# Patient Record
Sex: FEMALE | Race: WHITE | NOT HISPANIC OR LATINO | URBAN - METROPOLITAN AREA
[De-identification: names, ages, dates, MRNs, and addresses within clinical notes are randomized per-mention and may not be internally consistent; named-entity substitution may affect disease eponyms.]

---

## 2017-11-13 ENCOUNTER — EMERGENCY (EMERGENCY)
Facility: HOSPITAL | Age: 82
LOS: 1 days | Discharge: HOME | End: 2017-11-13
Attending: EMERGENCY MEDICINE | Admitting: EMERGENCY MEDICINE
Payer: MEDICARE

## 2017-11-13 VITALS
SYSTOLIC BLOOD PRESSURE: 147 MMHG | RESPIRATION RATE: 18 BRPM | OXYGEN SATURATION: 98 % | HEART RATE: 60 BPM | DIASTOLIC BLOOD PRESSURE: 70 MMHG

## 2017-11-13 VITALS
RESPIRATION RATE: 16 BRPM | OXYGEN SATURATION: 96 % | TEMPERATURE: 97 F | SYSTOLIC BLOOD PRESSURE: 138 MMHG | DIASTOLIC BLOOD PRESSURE: 69 MMHG | HEART RATE: 71 BPM

## 2017-11-13 DIAGNOSIS — Z90.710 ACQUIRED ABSENCE OF BOTH CERVIX AND UTERUS: Chronic | ICD-10-CM

## 2017-11-13 PROCEDURE — 72125 CT NECK SPINE W/O DYE: CPT | Mod: 26

## 2017-11-13 PROCEDURE — 70450 CT HEAD/BRAIN W/O DYE: CPT | Mod: 26

## 2017-11-13 PROCEDURE — 70450 CT HEAD/BRAIN W/O DYE: CPT

## 2017-11-13 PROCEDURE — 71045 X-RAY EXAM CHEST 1 VIEW: CPT

## 2017-11-13 PROCEDURE — 99284 EMERGENCY DEPT VISIT MOD MDM: CPT | Mod: 25

## 2017-11-13 PROCEDURE — 72170 X-RAY EXAM OF PELVIS: CPT

## 2017-11-13 PROCEDURE — 72125 CT NECK SPINE W/O DYE: CPT

## 2017-11-13 PROCEDURE — 99284 EMERGENCY DEPT VISIT MOD MDM: CPT | Mod: GC

## 2017-11-13 PROCEDURE — 71010: CPT | Mod: 26

## 2017-11-13 PROCEDURE — 72170 X-RAY EXAM OF PELVIS: CPT | Mod: 26

## 2017-11-13 NOTE — ED PROVIDER NOTE - PROGRESS NOTE DETAILS
ct negative. ambulating. will d/c back to Assisted living The patient and/or family has been educated on signs and the risks of subdural due to just falling and the swinging of the head back and forth as well as with contact and closed head injuries. Strict follow up instructions were given concerning subdural bleeding including but not limited to: confusion, nausea, vomiting, headache, mental status changes, difficulty walking, any concerns at all, etc..

## 2017-11-13 NOTE — ED ADULT NURSE NOTE - OBJECTIVE STATEMENT
Pt arrived via EMS from [facility] after falling and hitting back of head this morning at breakfast. Pt A&O x4, reports her foot was caught in her purse and she fell and hit her left side and head hit the floor, Pt denies headache, denies LOC, takes 81mg asa daily. Skin w/d/i. Pt has full range of motion in upper and lower extremities and reports no pain. Pt has B/L clear, diminished breath sounds. Pt denies chest pain, shortness of breath. Neuro WNL, B/L UE & LE equal strength, no numbness, pupils equal.

## 2017-11-13 NOTE — ED PROVIDER NOTE - MEDICAL DECISION MAKING DETAILS
Victorino: Patient s/p mechanical fall on ASA. no prior symptoms. will get ct head/c-spine, patient declined pain medications. will likely d/c back to assisted living.

## 2017-11-13 NOTE — ED ADULT NURSE NOTE - PMH
Anxiety    Depression    GERD (gastroesophageal reflux disease)    Hypertension    Insomnia    Osteopenia    Pacemaker    Spastic bladder    Vitamin B12 deficiency

## 2017-11-13 NOTE — ED PROVIDER NOTE - OBJECTIVE STATEMENT
89 yo F who lives in [facility] reports suffering a mechanical fall while sitting down for breakfast after she accidently got her foot stuck in her purse straps resulting in her falling over her left side. She denies LOC. She was down for < 2 minutes. She believes she may have made contact with the side of her head but otherwise has no complaints, including no pain anywhere. Patient is on 81 mg ASA and not on anti-coagulation.

## 2017-11-13 NOTE — ED ADULT NURSE REASSESSMENT NOTE - NS ED NURSE REASSESS COMMENT FT1
Sarah  states ambulette to take pt home will be arriving around 1700. Pt aware. Pt is resting in stretcher reading in NAD. PCA assisted pt to the bathroom

## 2018-10-12 ENCOUNTER — INPATIENT (INPATIENT)
Facility: HOSPITAL | Age: 83
LOS: 3 days | Discharge: HOME | DRG: 640 | End: 2018-10-16
Attending: INTERNAL MEDICINE | Admitting: INTERNAL MEDICINE
Payer: MEDICARE

## 2018-10-12 VITALS
HEIGHT: 62 IN | TEMPERATURE: 98 F | WEIGHT: 100.09 LBS | DIASTOLIC BLOOD PRESSURE: 63 MMHG | SYSTOLIC BLOOD PRESSURE: 102 MMHG | RESPIRATION RATE: 20 BRPM | OXYGEN SATURATION: 98 % | HEART RATE: 68 BPM

## 2018-10-12 DIAGNOSIS — Z90.710 ACQUIRED ABSENCE OF BOTH CERVIX AND UTERUS: Chronic | ICD-10-CM

## 2018-10-12 DIAGNOSIS — E87.1 HYPO-OSMOLALITY AND HYPONATREMIA: ICD-10-CM

## 2018-10-12 LAB
ALBUMIN SERPL ELPH-MCNC: 4.5 G/DL — SIGNIFICANT CHANGE UP (ref 3.3–5)
ALP SERPL-CCNC: 58 U/L — SIGNIFICANT CHANGE UP (ref 40–120)
ALT FLD-CCNC: 17 U/L — SIGNIFICANT CHANGE UP (ref 10–45)
ANION GAP SERPL CALC-SCNC: 13 MMOL/L — SIGNIFICANT CHANGE UP (ref 5–17)
APPEARANCE UR: CLEAR — SIGNIFICANT CHANGE UP
AST SERPL-CCNC: 18 U/L — SIGNIFICANT CHANGE UP (ref 10–40)
BACTERIA # UR AUTO: ABNORMAL
BASOPHILS # BLD AUTO: 0.1 K/UL — SIGNIFICANT CHANGE UP (ref 0–0.2)
BASOPHILS NFR BLD AUTO: 1.1 % — SIGNIFICANT CHANGE UP (ref 0–2)
BILIRUB SERPL-MCNC: 0.3 MG/DL — SIGNIFICANT CHANGE UP (ref 0.2–1.2)
BILIRUB UR-MCNC: NEGATIVE — SIGNIFICANT CHANGE UP
BUN SERPL-MCNC: 19 MG/DL — SIGNIFICANT CHANGE UP (ref 7–23)
CALCIUM SERPL-MCNC: 9.3 MG/DL — SIGNIFICANT CHANGE UP (ref 8.4–10.5)
CHLORIDE SERPL-SCNC: 89 MMOL/L — LOW (ref 96–108)
CO2 SERPL-SCNC: 21 MMOL/L — LOW (ref 22–31)
COLOR SPEC: SIGNIFICANT CHANGE UP
CREAT SERPL-MCNC: 0.73 MG/DL — SIGNIFICANT CHANGE UP (ref 0.5–1.3)
DIFF PNL FLD: NEGATIVE — SIGNIFICANT CHANGE UP
EOSINOPHIL # BLD AUTO: 0.1 K/UL — SIGNIFICANT CHANGE UP (ref 0–0.5)
EOSINOPHIL NFR BLD AUTO: 1 % — SIGNIFICANT CHANGE UP (ref 0–6)
EPI CELLS # UR: 1 /HPF — SIGNIFICANT CHANGE UP
GLUCOSE SERPL-MCNC: 107 MG/DL — HIGH (ref 70–99)
GLUCOSE UR QL: NEGATIVE — SIGNIFICANT CHANGE UP
HCT VFR BLD CALC: 31.3 % — LOW (ref 34.5–45)
HGB BLD-MCNC: 11 G/DL — LOW (ref 11.5–15.5)
HYALINE CASTS # UR AUTO: 0 /LPF — SIGNIFICANT CHANGE UP (ref 0–2)
KETONES UR-MCNC: NEGATIVE — SIGNIFICANT CHANGE UP
LEUKOCYTE ESTERASE UR-ACNC: ABNORMAL
LYMPHOCYTES # BLD AUTO: 0.8 K/UL — LOW (ref 1–3.3)
LYMPHOCYTES # BLD AUTO: 12.1 % — LOW (ref 13–44)
MCHC RBC-ENTMCNC: 32.2 PG — SIGNIFICANT CHANGE UP (ref 27–34)
MCHC RBC-ENTMCNC: 35.1 GM/DL — SIGNIFICANT CHANGE UP (ref 32–36)
MCV RBC AUTO: 91.8 FL — SIGNIFICANT CHANGE UP (ref 80–100)
MONOCYTES # BLD AUTO: 0.6 K/UL — SIGNIFICANT CHANGE UP (ref 0–0.9)
MONOCYTES NFR BLD AUTO: 8.3 % — SIGNIFICANT CHANGE UP (ref 2–14)
NEUTROPHILS # BLD AUTO: 5.3 K/UL — SIGNIFICANT CHANGE UP (ref 1.8–7.4)
NEUTROPHILS NFR BLD AUTO: 77.4 % — HIGH (ref 43–77)
NITRITE UR-MCNC: NEGATIVE — SIGNIFICANT CHANGE UP
OSMOLALITY SERPL: 263 MOS/KG — LOW (ref 275–300)
OSMOLALITY UR: 193 MOS/KG — LOW (ref 300–900)
PH UR: 7 — SIGNIFICANT CHANGE UP (ref 5–8)
PLATELET # BLD AUTO: 273 K/UL — SIGNIFICANT CHANGE UP (ref 150–400)
POTASSIUM SERPL-MCNC: 5 MMOL/L — SIGNIFICANT CHANGE UP (ref 3.5–5.3)
POTASSIUM SERPL-SCNC: 5 MMOL/L — SIGNIFICANT CHANGE UP (ref 3.5–5.3)
PROT SERPL-MCNC: 7.3 G/DL — SIGNIFICANT CHANGE UP (ref 6–8.3)
PROT UR-MCNC: NEGATIVE — SIGNIFICANT CHANGE UP
RBC # BLD: 3.41 M/UL — LOW (ref 3.8–5.2)
RBC # FLD: 12.4 % — SIGNIFICANT CHANGE UP (ref 10.3–14.5)
RBC CASTS # UR COMP ASSIST: 2 /HPF — SIGNIFICANT CHANGE UP (ref 0–4)
SODIUM SERPL-SCNC: 123 MMOL/L — LOW (ref 135–145)
SODIUM UR-SCNC: 27 MMOL/L — SIGNIFICANT CHANGE UP
SP GR SPEC: 1.01 — LOW (ref 1.01–1.02)
UROBILINOGEN FLD QL: NEGATIVE — SIGNIFICANT CHANGE UP
WBC # BLD: 6.8 K/UL — SIGNIFICANT CHANGE UP (ref 3.8–10.5)
WBC # FLD AUTO: 6.8 K/UL — SIGNIFICANT CHANGE UP (ref 3.8–10.5)
WBC UR QL: 20 /HPF — HIGH (ref 0–5)

## 2018-10-12 PROCEDURE — 93010 ELECTROCARDIOGRAM REPORT: CPT

## 2018-10-12 PROCEDURE — 99285 EMERGENCY DEPT VISIT HI MDM: CPT | Mod: GC,25

## 2018-10-12 PROCEDURE — 71045 X-RAY EXAM CHEST 1 VIEW: CPT | Mod: 26

## 2018-10-12 RX ORDER — SODIUM CHLORIDE 9 MG/ML
1000 INJECTION INTRAMUSCULAR; INTRAVENOUS; SUBCUTANEOUS
Qty: 0 | Refills: 0 | Status: DISCONTINUED | OUTPATIENT
Start: 2018-10-12 | End: 2018-10-13

## 2018-10-12 RX ORDER — ZOLPIDEM TARTRATE 10 MG/1
5 TABLET ORAL AT BEDTIME
Qty: 0 | Refills: 0 | Status: DISCONTINUED | OUTPATIENT
Start: 2018-10-12 | End: 2018-10-16

## 2018-10-12 RX ORDER — LOSARTAN POTASSIUM 100 MG/1
100 TABLET, FILM COATED ORAL DAILY
Qty: 0 | Refills: 0 | Status: DISCONTINUED | OUTPATIENT
Start: 2018-10-12 | End: 2018-10-16

## 2018-10-12 RX ORDER — CITALOPRAM 10 MG/1
20 TABLET, FILM COATED ORAL DAILY
Qty: 0 | Refills: 0 | Status: DISCONTINUED | OUTPATIENT
Start: 2018-10-12 | End: 2018-10-16

## 2018-10-12 RX ORDER — NIFEDIPINE 30 MG
60 TABLET, EXTENDED RELEASE 24 HR ORAL DAILY
Qty: 0 | Refills: 0 | Status: DISCONTINUED | OUTPATIENT
Start: 2018-10-12 | End: 2018-10-16

## 2018-10-12 RX ORDER — PANTOPRAZOLE SODIUM 20 MG/1
40 TABLET, DELAYED RELEASE ORAL
Qty: 0 | Refills: 0 | Status: DISCONTINUED | OUTPATIENT
Start: 2018-10-12 | End: 2018-10-16

## 2018-10-12 RX ORDER — SALIVA SUBSTITUTE COMB NO.11 351 MG
5 POWDER IN PACKET (EA) MUCOUS MEMBRANE AT BEDTIME
Qty: 0 | Refills: 0 | Status: DISCONTINUED | OUTPATIENT
Start: 2018-10-12 | End: 2018-10-16

## 2018-10-12 RX ORDER — ALPRAZOLAM 0.25 MG
0.25 TABLET ORAL THREE TIMES A DAY
Qty: 0 | Refills: 0 | Status: DISCONTINUED | OUTPATIENT
Start: 2018-10-12 | End: 2018-10-13

## 2018-10-12 RX ORDER — MIRTAZAPINE 45 MG/1
45 TABLET, ORALLY DISINTEGRATING ORAL AT BEDTIME
Qty: 0 | Refills: 0 | Status: DISCONTINUED | OUTPATIENT
Start: 2018-10-12 | End: 2018-10-16

## 2018-10-12 RX ORDER — CEFTRIAXONE 500 MG/1
1 INJECTION, POWDER, FOR SOLUTION INTRAMUSCULAR; INTRAVENOUS ONCE
Qty: 0 | Refills: 0 | Status: COMPLETED | OUTPATIENT
Start: 2018-10-12 | End: 2018-10-12

## 2018-10-12 RX ORDER — GABAPENTIN 400 MG/1
300 CAPSULE ORAL
Qty: 0 | Refills: 0 | Status: DISCONTINUED | OUTPATIENT
Start: 2018-10-12 | End: 2018-10-16

## 2018-10-12 RX ORDER — CEFTRIAXONE 500 MG/1
1 INJECTION, POWDER, FOR SOLUTION INTRAMUSCULAR; INTRAVENOUS EVERY 24 HOURS
Qty: 0 | Refills: 0 | Status: DISCONTINUED | OUTPATIENT
Start: 2018-10-12 | End: 2018-10-16

## 2018-10-12 RX ORDER — CALCIUM CARBONATE 500(1250)
1 TABLET ORAL DAILY
Qty: 0 | Refills: 0 | Status: DISCONTINUED | OUTPATIENT
Start: 2018-10-12 | End: 2018-10-16

## 2018-10-12 RX ORDER — HEPARIN SODIUM 5000 [USP'U]/ML
5000 INJECTION INTRAVENOUS; SUBCUTANEOUS EVERY 8 HOURS
Qty: 0 | Refills: 0 | Status: DISCONTINUED | OUTPATIENT
Start: 2018-10-12 | End: 2018-10-16

## 2018-10-12 RX ORDER — ASPIRIN/CALCIUM CARB/MAGNESIUM 324 MG
81 TABLET ORAL DAILY
Qty: 0 | Refills: 0 | Status: DISCONTINUED | OUTPATIENT
Start: 2018-10-12 | End: 2018-10-16

## 2018-10-12 RX ORDER — ACETAMINOPHEN 500 MG
325 TABLET ORAL EVERY 6 HOURS
Qty: 0 | Refills: 0 | Status: DISCONTINUED | OUTPATIENT
Start: 2018-10-12 | End: 2018-10-16

## 2018-10-12 RX ADMIN — CEFTRIAXONE 100 GRAM(S): 500 INJECTION, POWDER, FOR SOLUTION INTRAMUSCULAR; INTRAVENOUS at 13:43

## 2018-10-12 RX ADMIN — HEPARIN SODIUM 5000 UNIT(S): 5000 INJECTION INTRAVENOUS; SUBCUTANEOUS at 22:00

## 2018-10-12 NOTE — ED PROVIDER NOTE - MEDICAL DECISION MAKING DETAILS
89 yo F PMHx HTN, pacemaker, anxiety, spastic bladder, osteopenia, arrives from [facility] p/w weakness, well-appearing, will check labs, UA, CXR. reevaluate Dr. Sommers (Attending Physician)  89 yo F PMHx HTN, pacemaker, anxiety, spastic bladder, osteopenia, arrives from [facility] p/w weakness, well-appearing, will check labs, UA, CXR. reevaluate

## 2018-10-12 NOTE — ED PROVIDER NOTE - ATTENDING CONTRIBUTION TO CARE
Dr. Sommers (Attending Physician)  I performed a history and physical exam of the patient and discussed their management with the resident. I reviewed the resident's note and agree with the documented findings and plan of care. My medical decision making and observations are found above.

## 2018-10-12 NOTE — ED ADULT NURSE NOTE - NSIMPLEMENTINTERV_GEN_ALL_ED
Implemented All Fall with Harm Risk Interventions:  [place] to call system. Call bell, personal items and telephone within reach. Instruct patient to call for assistance. Room bathroom lighting operational. Non-slip footwear when patient is off stretcher. Physically safe environment: no spills, clutter or unnecessary equipment. Stretcher in lowest position, wheels locked, appropriate side rails in place. Provide visual cue, wrist band, yellow gown, etc. Monitor gait and stability. Monitor for mental status changes and reorient to person, place, and time. Review medications for side effects contributing to fall risk. Reinforce activity limits and safety measures with patient and family. Provide visual clues: red socks.

## 2018-10-12 NOTE — H&P ADULT - HISTORY OF PRESENT ILLNESS
89 yo F PMHx HTN, pacemaker, anxiety, spastic bladder, osteopenia, arrives from [facility] p/w weakness. Pt states her legs feel weak from her shins to her ankles. She denies fevers/chills, CP/palpitations, SOB, N/V, abd pain, dysuria. She has had some urinary leakage recently. She has also lost 10 lbs in the past several weeks. She saw her PMD Dr. Salinas about 2 weeks ago at which point no bloodwork was done and she was told to return in 2 weeks. Pt walks with a walker at baseline and always used her walker.

## 2018-10-12 NOTE — ED ADULT NURSE NOTE - OBJECTIVE STATEMENT
89 yo presents to the ED from [place] by EMS s/p "feeling weak this morning". EMS reports that pt was ambulating with walker this morning when she felt bl under the knees weakness. pt denies pain currently. PMH of anxiety, depression and HTN. 89 yo presents to the ED from [place] by EMS s/p "feeling weak this morning". EMS reports that pt was ambulating with walker this morning when she felt bl under the knees weakness. pt denies pain currently. PMH of anxiety, depression and HTN. Patient states that she has no weakness above the knee. Denies fever, chills, n/v, abd pain, diarrhea/constipation, headache, dizziness, numbness/tingling, urinary s/s. Patient AAOx3, in no respiratory distress, no chest pain. Patient safety maintained with side rails up, call bell within reach and bed in the lowest position. 89 yo presents to the ED from [place] by EMS A&Ox4 s/p "feeling weak this morning". EMS reports that pt was ambulating with walker this morning when she felt bl under the knees weakness. pt denies pain currently. PMH of anxiety, depression and HTN. Patient states that she has no weakness above the knee, pt points to shin and states "its just here, it just feels weak". pt states "I have visions that I might fall". pt denies recent falls. pt states that she always ambulates with walker. pt reports that when she felt weak this morning, she sat down immediately. Denies fever, chills, n/v, abd pain, diarrhea/constipation, headache, dizziness, numbness/tingling, urinary s/s. pt reports weight loss of 10lb within recent month, pt states "I push myself to eat, I cant drink water because it has acid in it so I drink milk". pt recently saw PMD two weeks ago and pt states "they checked me out they said everything was fine and told me to come back in a few weeks". Patient in no respiratory distress, no chest pain, VSS. abd soft nontender nondistended. gross neuro intact, pt following commands without any difficulty. pt is well appearing. Patient safety maintained with side rails up, call bell within reach and bed in the lowest position.

## 2018-10-12 NOTE — H&P ADULT - NSHPLABSRESULTS_GEN_ALL_CORE
LABS:                        11.0   6.8   )-----------( 273      ( 12 Oct 2018 12:25 )             31.3     10-12    123<L>  |  89<L>  |  19  ----------------------------<  107<H>  5.0   |  21<L>  |  0.73    Ca    9.3      12 Oct 2018 12:25    TPro  7.3  /  Alb  4.5  /  TBili  0.3  /  DBili  x   /  AST  18  /  ALT  17  /  AlkPhos  58  10-12      Urinalysis Basic - ( 12 Oct 2018 12:45 )    Color: Light Yellow / Appearance: Clear / S.007 / pH: x  Gluc: x / Ketone: Negative  / Bili: Negative / Urobili: Negative   Blood: x / Protein: Negative / Nitrite: Negative   Leuk Esterase: Large / RBC: 2 /hpf / WBC 20 /hpf   Sq Epi: x / Non Sq Epi: 1 /hpf / Bacteria: Many            RADIOLOGY & ADDITIONAL TESTS:

## 2018-10-12 NOTE — H&P ADULT - ASSESSMENT
89 yo F PMHx HTN, pacemaker, anxiety, spastic bladder, osteopenia, arrives from [facility] p/w weakness. Pt states her legs feel weak from her shins to her ankles. She denies fevers/chills, CP/palpitations, SOB, N/V, abd pain, dysuria. She has had some urinary leakage recently. She has also lost 10 lbs in the past several weeks. She saw her PMD Dr. Salinas about 2 weeks ago at which point no bloodwork was done and she was told to return in 2 weeks. Pt walks with a walker at baseline and always used her walker.    weakness multifactorial    hyponatremia  pis very dry on exam  - urine osm and lytes  - iv fluids NS  - will consider renal consult  - may need to d/c celexa and remeron    UTI  - ceftriaxone x 3 days  - follow up urine culture    anxiety / depression  - cw celexa  - c/w remeron  - cw xanax    HTN  - cw nifedipine    GERD  - cw protonix    dvt px  - heparin

## 2018-10-12 NOTE — ED PROVIDER NOTE - NS ED ROS FT
GENERAL: No fever or chills, +weakness, EYES: no change in vision, HEENT: no trouble swallowing or speaking, CARDIAC: no chest pain, PULMONARY: no cough or SOB, GI: no abdominal pain, no nausea, no vomiting, no diarrhea or constipation, : No changes in urination, SKIN: no rashes, NEURO: no headache,  MSK: No joint pain ~Jaqueline Scott M.D. Resident

## 2018-10-12 NOTE — ED PROVIDER NOTE - PROGRESS NOTE DETAILS
Jaqueline Scott M.D. Resident: Spoke to patient's PMD, Dr. Salinas, does not admit [facility]

## 2018-10-12 NOTE — ED PROVIDER NOTE - OBJECTIVE STATEMENT
89 yo F PMHx HTN, pacemaker, anxiety, spastic bladder, osteopenia, arrives from [facility] p/w weakness. Pt states her legs feel weak from her shins to her ankles. She denies fevers/chills, CP/palpitations, SOB, N/V, abd pain, dysuria. She has had some urinary leakage recently. She has also lost 10 lbs in the past several weeks. She saw her PMD Dr. Salinas about 2 weeks ago at which point no bloodwork was done and she was told to return in 2 weeks. Pt walks with a walker at baseline and always used her walker.    PMD: Dr. Salinas

## 2018-10-12 NOTE — ED PROVIDER NOTE - PHYSICAL EXAMINATION
Gen: AAOx3, non-toxic, hard of hearing  Head: NCAT  HEENT: EOMI, oral mucosa moist, normal conjunctiva  Lung: CTAB, no respiratory distress, no wheezes/rhonchi/rales B/L, speaking in full sentences  CV: RRR, no murmurs, rubs or gallops, +1 peripheral edema to BLLE  Abd: soft, NTND, no guarding  MSK: no visible deformities  Neuro: No focal sensory or motor deficits  Skin: Warm, well perfused, no rash  Psych: normal affect.   ~Jaqueline Scott M.D. Resident

## 2018-10-13 DIAGNOSIS — R32 UNSPECIFIED URINARY INCONTINENCE: ICD-10-CM

## 2018-10-13 DIAGNOSIS — E87.1 HYPO-OSMOLALITY AND HYPONATREMIA: ICD-10-CM

## 2018-10-13 LAB
ANION GAP SERPL CALC-SCNC: 13 MMOL/L — SIGNIFICANT CHANGE UP (ref 5–17)
BUN SERPL-MCNC: 14 MG/DL — SIGNIFICANT CHANGE UP (ref 7–23)
CALCIUM SERPL-MCNC: 8.8 MG/DL — SIGNIFICANT CHANGE UP (ref 8.4–10.5)
CHLORIDE SERPL-SCNC: 90 MMOL/L — LOW (ref 96–108)
CO2 SERPL-SCNC: 21 MMOL/L — LOW (ref 22–31)
CREAT SERPL-MCNC: 0.72 MG/DL — SIGNIFICANT CHANGE UP (ref 0.5–1.3)
CULTURE RESULTS: SIGNIFICANT CHANGE UP
FERRITIN SERPL-MCNC: 53 NG/ML — SIGNIFICANT CHANGE UP (ref 15–150)
FOLATE SERPL-MCNC: >20 NG/ML — SIGNIFICANT CHANGE UP
GLUCOSE SERPL-MCNC: 96 MG/DL — SIGNIFICANT CHANGE UP (ref 70–99)
HCT VFR BLD CALC: 28 % — LOW (ref 34.5–45)
HGB BLD-MCNC: 10.1 G/DL — LOW (ref 11.5–15.5)
IRON SATN MFR SERPL: 30 % — SIGNIFICANT CHANGE UP (ref 14–50)
IRON SATN MFR SERPL: 83 UG/DL — SIGNIFICANT CHANGE UP (ref 30–160)
MAGNESIUM SERPL-MCNC: 1.8 MG/DL — SIGNIFICANT CHANGE UP (ref 1.6–2.6)
MCHC RBC-ENTMCNC: 32 PG — SIGNIFICANT CHANGE UP (ref 27–34)
MCHC RBC-ENTMCNC: 36.1 GM/DL — HIGH (ref 32–36)
MCV RBC AUTO: 88.6 FL — SIGNIFICANT CHANGE UP (ref 80–100)
PHOSPHATE SERPL-MCNC: 3.2 MG/DL — SIGNIFICANT CHANGE UP (ref 2.5–4.5)
PLATELET # BLD AUTO: 234 K/UL — SIGNIFICANT CHANGE UP (ref 150–400)
POTASSIUM SERPL-MCNC: 4.3 MMOL/L — SIGNIFICANT CHANGE UP (ref 3.5–5.3)
POTASSIUM SERPL-SCNC: 4.3 MMOL/L — SIGNIFICANT CHANGE UP (ref 3.5–5.3)
RBC # BLD: 3.16 M/UL — LOW (ref 3.8–5.2)
RBC # FLD: 14 % — SIGNIFICANT CHANGE UP (ref 10.3–14.5)
SODIUM SERPL-SCNC: 124 MMOL/L — LOW (ref 135–145)
SPECIMEN SOURCE: SIGNIFICANT CHANGE UP
TIBC SERPL-MCNC: 280 UG/DL — SIGNIFICANT CHANGE UP (ref 220–430)
TSH SERPL-MCNC: 2.06 UIU/ML — SIGNIFICANT CHANGE UP (ref 0.27–4.2)
UIBC SERPL-MCNC: 197 UG/DL — SIGNIFICANT CHANGE UP (ref 110–370)
VIT B12 SERPL-MCNC: 1347 PG/ML — HIGH (ref 232–1245)
WBC # BLD: 6.38 K/UL — SIGNIFICANT CHANGE UP (ref 3.8–10.5)
WBC # FLD AUTO: 6.38 K/UL — SIGNIFICANT CHANGE UP (ref 3.8–10.5)

## 2018-10-13 PROCEDURE — 74018 RADEX ABDOMEN 1 VIEW: CPT | Mod: 26

## 2018-10-13 RX ORDER — SODIUM CHLORIDE 9 MG/ML
1000 INJECTION INTRAMUSCULAR; INTRAVENOUS; SUBCUTANEOUS
Qty: 0 | Refills: 0 | Status: DISCONTINUED | OUTPATIENT
Start: 2018-10-13 | End: 2018-10-16

## 2018-10-13 RX ORDER — ALPRAZOLAM 0.25 MG
0.25 TABLET ORAL THREE TIMES A DAY
Qty: 0 | Refills: 0 | Status: DISCONTINUED | OUTPATIENT
Start: 2018-10-13 | End: 2018-10-16

## 2018-10-13 RX ADMIN — CITALOPRAM 20 MILLIGRAM(S): 10 TABLET, FILM COATED ORAL at 12:14

## 2018-10-13 RX ADMIN — MIRTAZAPINE 45 MILLIGRAM(S): 45 TABLET, ORALLY DISINTEGRATING ORAL at 21:24

## 2018-10-13 RX ADMIN — LOSARTAN POTASSIUM 100 MILLIGRAM(S): 100 TABLET, FILM COATED ORAL at 05:44

## 2018-10-13 RX ADMIN — Medication 81 MILLIGRAM(S): at 12:14

## 2018-10-13 RX ADMIN — GABAPENTIN 300 MILLIGRAM(S): 400 CAPSULE ORAL at 17:27

## 2018-10-13 RX ADMIN — HEPARIN SODIUM 5000 UNIT(S): 5000 INJECTION INTRAVENOUS; SUBCUTANEOUS at 13:53

## 2018-10-13 RX ADMIN — Medication 60 MILLIGRAM(S): at 05:44

## 2018-10-13 RX ADMIN — HEPARIN SODIUM 5000 UNIT(S): 5000 INJECTION INTRAVENOUS; SUBCUTANEOUS at 21:24

## 2018-10-13 RX ADMIN — PANTOPRAZOLE SODIUM 40 MILLIGRAM(S): 20 TABLET, DELAYED RELEASE ORAL at 05:46

## 2018-10-13 RX ADMIN — SODIUM CHLORIDE 30 MILLILITER(S): 9 INJECTION INTRAMUSCULAR; INTRAVENOUS; SUBCUTANEOUS at 18:04

## 2018-10-13 RX ADMIN — Medication 1 TABLET(S): at 12:14

## 2018-10-13 RX ADMIN — GABAPENTIN 300 MILLIGRAM(S): 400 CAPSULE ORAL at 05:44

## 2018-10-13 RX ADMIN — Medication 0.25 MILLIGRAM(S): at 20:13

## 2018-10-13 RX ADMIN — Medication 5 MILLILITER(S): at 21:24

## 2018-10-13 RX ADMIN — Medication 0.25 MILLIGRAM(S): at 10:02

## 2018-10-13 RX ADMIN — CEFTRIAXONE 100 GRAM(S): 500 INJECTION, POWDER, FOR SOLUTION INTRAMUSCULAR; INTRAVENOUS at 13:53

## 2018-10-13 RX ADMIN — HEPARIN SODIUM 5000 UNIT(S): 5000 INJECTION INTRAVENOUS; SUBCUTANEOUS at 05:44

## 2018-10-13 RX ADMIN — Medication 0.25 MILLIGRAM(S): at 17:26

## 2018-10-13 NOTE — CONSULT NOTE ADULT - ASSESSMENT
89 y/o female with below stated PMHx, including HTN, Spastic bladder, depression/anxiety, who presents with LE weakness x 2 weeks.  In addition, the patient complains of worsening bladder control, with multiple episodes of urinary frequency resulting in incontinence, especially at night.  Renal evaluation requested for hyponatremia noted on admission labs.

## 2018-10-13 NOTE — PROGRESS NOTE ADULT - ASSESSMENT
89 yo F PMHx HTN, pacemaker, anxiety, spastic bladder, osteopenia, arrives from [facility] p/w weakness. Pt states her legs feel weak from her shins to her ankles. She denies fevers/chills, CP/palpitations, SOB, N/V, abd pain, dysuria. She has had some urinary leakage recently. She has also lost 10 lbs in the past several weeks. She saw her PMD Dr. Salinas about 2 weeks ago at which point no bloodwork was done and she was told to return in 2 weeks. Pt walks with a walker at baseline and always used her walker.    weakness multifactorial    hyponatremia  pt is very dry on exam  - urine osm and lytes  - iv fluids NS  -  renal consult called  - may need to d/c celexa and remeron  - hold ditropan    UTI  - ceftriaxone x 3 days  - follow up urine culture    anxiety / depression  - cw celexa  - c/w remeron  - cw xanax change to ATC    HTN  - cw nifedipine    GERD  - cw protonix    dvt px  - heparin 89 yo F PMHx HTN, pacemaker, anxiety, spastic bladder, osteopenia, arrives from [facility] p/w weakness. Pt states her legs feel weak from her shins to her ankles. She denies fevers/chills, CP/palpitations, SOB, N/V, abd pain, dysuria. She has had some urinary leakage recently. She has also lost 10 lbs in the past several weeks. She saw her PMD Dr. Salinas about 2 weeks ago at which point no bloodwork was done and she was told to return in 2 weeks. Pt walks with a walker at baseline and always used her walker.    weakness multifactorial    hyponatremia  pt is very dry on exam  - urine osm and lytes  - iv fluids NS  -  renal consult called  - may need to d/c celexa and remeron  - hold ditropan    UTI  - ceftriaxone x 3 days  - follow up urine culture    distended abd  - KUB neg    anxiety / depression  - cw celexa  - c/w remeron  - cw xanax change to ATC    HTN  - cw nifedipine    GERD  - cw protonix    dvt px  - heparin

## 2018-10-13 NOTE — CONSULT NOTE ADULT - PROBLEM SELECTOR RECOMMENDATION 2
Likely overflow incontinence due to incomplete bladder emptying/urinary retention  PVR >400ml  patient with a history of "spastic bladder"  will likely need zee catheter  urology consult please-- patient also being treated for UTI, which could either be a cause or a result of urinary retention.  UCx pending.  patient states that she does not follow with a urologist despite diagnosis of "spastic bladder"  case discussed with urology resident via telephone as well as medicine NP

## 2018-10-13 NOTE — CONSULT NOTE ADULT - SUBJECTIVE AND OBJECTIVE BOX
[facility]  [phone]  Dr. Morales (covering for Dr. Cosme)  NEPHROLOGY  INITIAL CONSULT NOTE  --------------------------------------------------------------------------------  HPI: 89 y/o female with below stated PMHx, including HTN, Spastic bladder, depression/anxiety, who presents with LE weakness x 2 weeks.  In addition, the patient complains of worsening bladder control, with multiple episodes of urinary frequency resulting in incontinence, especially at night.  Renal evaluation requested for hyponatremia noted on admission labs.    Upon initial evaluation, bladder scan performed immediately after patient had an episode of urinary incontinence in her diaper.  PVR demonstrated >400cc bladder.    PAST HISTORY  --------------------------------------------------------------------------------  PAST MEDICAL & SURGICAL HISTORY:  Vitamin B12 deficiency  Spastic bladder  Insomnia  Osteopenia  GERD (gastroesophageal reflux disease)  Hypertension  Depression  Anxiety  Pacemaker  S/P hysterectomy    FAMILY HISTORY:  No pertinent family history    PAST SOCIAL HISTORY:    ALLERGIES & MEDICATIONS  --------------------------------------------------------------------------------  Allergies    No Known Allergies    Intolerances      Standing Inpatient Medications  ALPRAZolam 0.25 milliGRAM(s) Oral three times a day  aspirin enteric coated 81 milliGRAM(s) Oral daily  Biotene Dry Mouth Oral Rinse 5 milliLiter(s) Swish and Spit at bedtime  calcium carbonate   1250 mG (OsCal) 1 Tablet(s) Oral daily  cefTRIAXone   IVPB 1 Gram(s) IV Intermittent every 24 hours  citalopram 20 milliGRAM(s) Oral daily  gabapentin 300 milliGRAM(s) Oral two times a day  heparin  Injectable 5000 Unit(s) SubCutaneous every 8 hours  losartan 100 milliGRAM(s) Oral daily  mirtazapine 45 milliGRAM(s) Oral at bedtime  NIFEdipine XL 60 milliGRAM(s) Oral daily  pantoprazole    Tablet 40 milliGRAM(s) Oral before breakfast  sodium chloride 0.9%. 1000 milliLiter(s) IV Continuous <Continuous>    PRN Inpatient Medications  acetaminophen   Tablet .. 325 milliGRAM(s) Oral every 6 hours PRN  zolpidem 5 milliGRAM(s) Oral at bedtime PRN      REVIEW OF SYSTEMS  --------------------------------------------------------------------------------  General: + fatigue/weakness  CVS: denies chest pain/palpitations  Respiratory: denies SOB/cough  GI:  denies N/V/abd pain  : + urinary frequency/urgency  Neuro: +LE weakness    All other systems were reviewed and are negative, except as noted.    VITALS/PHYSICAL EXAM  --------------------------------------------------------------------------------  T(C): 36.7 (10-13-18 @ 12:38), Max: 36.9 (10-12-18 @ 19:04)  HR: 68 (10-13-18 @ 12:38) (62 - 88)  BP: 105/57 (10-13-18 @ 12:38) (105/57 - 146/67)  RR: 18 (10-13-18 @ 12:38) (18 - 18)  SpO2: 95% (10-13-18 @ 12:38) (95% - 98%)  Wt(kg): --  Height (cm): 157.48 (10-12-18 @ 19:04)  Weight (kg): 43.6 (10-12-18 @ 19:04)  BMI (kg/m2): 17.6 (10-12-18 @ 19:04)  BSA (m2): 1.4 (10-12-18 @ 19:04)      10-12-18 @ 07:01  -  10-13-18 @ 07:00  --------------------------------------------------------  IN: 990 mL / OUT: 525 mL / NET: 465 mL      Physical Exam:  	Gen: frail and anxious-appearing  	Pulm: CTA B/L no w/r/r  	CV: RRR, S1S2  	Abd: +BS, soft, nontender  	: + suprapubic distention. no zee  	Extremity: No LE cyanosis or edema  	Neuro: A&Ox3      LABS/STUDIES  --------------------------------------------------------------------------------              10.1   6.38  >-----------<  234      [10-13-18 @ 08:23]              28.0     124  |  90  |  14  ----------------------------<  96      [10-13-18 @ 06:47]  4.3   |  21  |  0.72        Ca     8.8     [10-13-18 @ 06:47]      Mg     1.8     [10-13-18 @ 06:47]      Phos  3.2     [10-13-18 @ 06:47]    TPro  7.3  /  Alb  4.5  /  TBili  0.3  /  DBili  x   /  AST  18  /  ALT  17  /  AlkPhos  58  [10-12-18 @ 12:25]        Serum Osmolality 263      [10-12-18 @ 13:46]    eGFR if : 82 mL/min/1.73M2 (10-13 @ 06:47)    eGFR if Non African American: 71 mL/min/1.73M2 (10-13 @ 06:47)    Creatinine Trend:  SCr 0.72 [10-13 @ 06:47]  SCr 0.73 [10-12 @ 12:25]    Urinalysis - [10-12-18 @ 12:45]      Color Light Yellow / Appearance Clear / SG 1.007 / pH 7.0      Gluc Negative / Ketone Negative  / Bili Negative / Urobili Negative       Blood Negative / Protein Negative / Leuk Est Large / Nitrite Negative      RBC 2 / WBC 20 / Hyaline 0 / Gran  / Sq Epi  / Non Sq Epi 1 / Bacteria Many    Urine Sodium 27      [10-12-18 @ 13:50]  Urine Osmolality 193      [10-12-18 @ 13:50]    Iron 83, TIBC 280, %sat 30      [10-13-18 @ 08:23]  Ferritin 53      [10-13-18 @ 08:23]  TSH 2.06      [10-13-18 @ 08:23]

## 2018-10-13 NOTE — CONSULT NOTE ADULT - SUBJECTIVE AND OBJECTIVE BOX
HPI: 89 yo F PMHx HTN, pacemaker, anxiety, spastic bladder, osteopenia, arrives from [facility] p/w weakness, admitted 10/12 for presumed UTI. Uro called because during renal eval for hyponatremia she was noted to have a high residual urine in bladder after incontinent void (~400cc). Pt  states she noticed 1 month ago onset of urinary frequency and incontinence. wakes about 5x/night to void. States she noticed a 10lb weight loss around the same time. She denies fevers/chills, CP/palpitations, SOB, N/V, abd pain, dysuria, hematuria. Denies seeing urologist in past, but was on ditropan as outpatient for "spastic bladder", written by PMD.        PAST MEDICAL & SURGICAL HISTORY:  Vitamin B12 deficiency  Spastic bladder  Insomnia  Osteopenia  GERD (gastroesophageal reflux disease)  Hypertension  Depression  Anxiety  Pacemaker  S/P hysterectomy    FAMILY HISTORY:  No pertinent family history    SOCIAL HISTORY:   Tobacco hx:    MEDICATIONS  (STANDING):  ALPRAZolam 0.25 milliGRAM(s) Oral three times a day  aspirin enteric coated 81 milliGRAM(s) Oral daily  Biotene Dry Mouth Oral Rinse 5 milliLiter(s) Swish and Spit at bedtime  calcium carbonate   1250 mG (OsCal) 1 Tablet(s) Oral daily  cefTRIAXone   IVPB 1 Gram(s) IV Intermittent every 24 hours  citalopram 20 milliGRAM(s) Oral daily  gabapentin 300 milliGRAM(s) Oral two times a day  heparin  Injectable 5000 Unit(s) SubCutaneous every 8 hours  losartan 100 milliGRAM(s) Oral daily  mirtazapine 45 milliGRAM(s) Oral at bedtime  NIFEdipine XL 60 milliGRAM(s) Oral daily  pantoprazole    Tablet 40 milliGRAM(s) Oral before breakfast  sodium chloride 0.9%. 1000 milliLiter(s) (30 mL/Hr) IV Continuous <Continuous>    MEDICATIONS  (PRN):  acetaminophen   Tablet .. 325 milliGRAM(s) Oral every 6 hours PRN Mild Pain (1 - 3)  zolpidem 5 milliGRAM(s) Oral at bedtime PRN Insomnia    Allergies    No Known Allergies    Intolerances        REVIEW OF SYSTEMS: Pertinent positives and negatives as stated in HPI, otherwise negative    Vital signs  T(C): 36.7 (10-13-18 @ 12:38), Max: 36.9 (10-12-18 @ 19:04)  HR: 68 (10-13-18 @ 12:38)  BP: 105/57 (10-13-18 @ 12:38)  SpO2: 95% (10-13-18 @ 12:38)  Wt(kg): --    Output    UOP    Physical Exam  Gen: NAD  Pulm: No respiratory distress, no subcostal retractions  CV: RRR, no JVD  Abd: Softly distended, nontender, no CVAT    LABS:          10-13 @ 08:23    WBC 6.38  / Hct 28.0  / SCr --       10-13 @ 06:47    WBC --    / Hct --    / SCr 0.72     10-13    124<L>  |  90<L>  |  14  ----------------------------<  96  4.3   |  21<L>  |  0.72    Ca    8.8      13 Oct 2018 06:47  Phos  3.2     10-13  Mg     1.8     10-13    TPro  7.3  /  Alb  4.5  /  TBili  0.3  /  DBili  x   /  AST  18  /  ALT  17  /  AlkPhos  58  10-12      Urinalysis Basic - ( 12 Oct 2018 12:45 )    Color: Light Yellow / Appearance: Clear / S.007 / pH: x  Gluc: x / Ketone: Negative  / Bili: Negative / Urobili: Negative   Blood: x / Protein: Negative / Nitrite: Negative   Leuk Esterase: Large / RBC: 2 /hpf / WBC 20 /hpf   Sq Epi: x / Non Sq Epi: 1 /hpf / Bacteria: Many        Urine Cx: contaminated

## 2018-10-13 NOTE — PROGRESS NOTE ADULT - SUBJECTIVE AND OBJECTIVE BOX
Patient is a 89y old  Female who presents with a chief complaint of weakness (12 Oct 2018 22:00)      SUBJECTIVE / OVERNIGHT EVENTS:  feels restless and agitated    MEDICATIONS  (STANDING):  ALPRAZolam 0.25 milliGRAM(s) Oral three times a day  aspirin enteric coated 81 milliGRAM(s) Oral daily  Biotene Dry Mouth Oral Rinse 5 milliLiter(s) Swish and Spit at bedtime  calcium carbonate   1250 mG (OsCal) 1 Tablet(s) Oral daily  cefTRIAXone   IVPB 1 Gram(s) IV Intermittent every 24 hours  citalopram 20 milliGRAM(s) Oral daily  gabapentin 300 milliGRAM(s) Oral two times a day  heparin  Injectable 5000 Unit(s) SubCutaneous every 8 hours  losartan 100 milliGRAM(s) Oral daily  mirtazapine 45 milliGRAM(s) Oral at bedtime  NIFEdipine XL 60 milliGRAM(s) Oral daily  pantoprazole    Tablet 40 milliGRAM(s) Oral before breakfast  sodium chloride 0.9%. 1000 milliLiter(s) (75 mL/Hr) IV Continuous <Continuous>    MEDICATIONS  (PRN):  acetaminophen   Tablet .. 325 milliGRAM(s) Oral every 6 hours PRN Mild Pain (1 - 3)  zolpidem 5 milliGRAM(s) Oral at bedtime PRN Insomnia      Vital Signs Last 24 Hrs  T(C): 36.7 (13 Oct 2018 12:38), Max: 36.9 (12 Oct 2018 19:04)  T(F): 98.1 (13 Oct 2018 12:38), Max: 98.5 (13 Oct 2018 04:19)  HR: 68 (13 Oct 2018 12:38) (62 - 88)  BP: 105/57 (13 Oct 2018 12:38) (105/57 - 146/67)  BP(mean): --  RR: 18 (13 Oct 2018 12:38) (16 - 18)  SpO2: 95% (13 Oct 2018 12:38) (95% - 99%)  CAPILLARY BLOOD GLUCOSE        I&O's Summary    12 Oct 2018 07:01  -  13 Oct 2018 07:00  --------------------------------------------------------  IN: 990 mL / OUT: 525 mL / NET: 465 mL        PHYSICAL EXAM:  GENERAL: NAD, well-developed  HEAD:  Atraumatic, Normocephalic  EYES: EOMI, PERRLA, conjunctiva and sclera clear  NECK: Supple, No JVD  CHEST/LUNG: Clear to auscultation bilaterally; No wheeze  HEART: Regular rate and rhythm; No murmurs, rubs, or gallops  ABDOMEN: Soft, Nontender, Nondistended; Bowel sounds present  EXTREMITIES:  2+ Peripheral Pulses, No clubbing, cyanosis, or edema  PSYCH: AAOx3  NEUROLOGY: non-focal  SKIN: dry mucous membranes    LABS:                        10.1   6.38  )-----------( 234      ( 13 Oct 2018 08:23 )             28.0     10-    124<L>  |  90<L>  |  14  ----------------------------<  96  4.3   |  21<L>  |  0.72    Ca    8.8      13 Oct 2018 06:47  Phos  3.2     10-  Mg     1.8     10-    TPro  7.3  /  Alb  4.5  /  TBili  0.3  /  DBili  x   /  AST  18  /  ALT  17  /  AlkPhos  58  10-12          Urinalysis Basic - ( 12 Oct 2018 12:45 )    Color: Light Yellow / Appearance: Clear / S.007 / pH: x  Gluc: x / Ketone: Negative  / Bili: Negative / Urobili: Negative   Blood: x / Protein: Negative / Nitrite: Negative   Leuk Esterase: Large / RBC: 2 /hpf / WBC 20 /hpf   Sq Epi: x / Non Sq Epi: 1 /hpf / Bacteria: Many        RADIOLOGY & ADDITIONAL TESTS:    Imaging Personally Reviewed:    Consultant(s) Notes Reviewed:      Care Discussed with Consultants/Other Providers: Patient is a 89y old  Female who presents with a chief complaint of weakness (12 Oct 2018 22:00)      SUBJECTIVE / OVERNIGHT EVENTS:  feels restless and agitated    MEDICATIONS  (STANDING):  ALPRAZolam 0.25 milliGRAM(s) Oral three times a day  aspirin enteric coated 81 milliGRAM(s) Oral daily  Biotene Dry Mouth Oral Rinse 5 milliLiter(s) Swish and Spit at bedtime  calcium carbonate   1250 mG (OsCal) 1 Tablet(s) Oral daily  cefTRIAXone   IVPB 1 Gram(s) IV Intermittent every 24 hours  citalopram 20 milliGRAM(s) Oral daily  gabapentin 300 milliGRAM(s) Oral two times a day  heparin  Injectable 5000 Unit(s) SubCutaneous every 8 hours  losartan 100 milliGRAM(s) Oral daily  mirtazapine 45 milliGRAM(s) Oral at bedtime  NIFEdipine XL 60 milliGRAM(s) Oral daily  pantoprazole    Tablet 40 milliGRAM(s) Oral before breakfast  sodium chloride 0.9%. 1000 milliLiter(s) (75 mL/Hr) IV Continuous <Continuous>    MEDICATIONS  (PRN):  acetaminophen   Tablet .. 325 milliGRAM(s) Oral every 6 hours PRN Mild Pain (1 - 3)  zolpidem 5 milliGRAM(s) Oral at bedtime PRN Insomnia      Vital Signs Last 24 Hrs  T(C): 36.7 (13 Oct 2018 12:38), Max: 36.9 (12 Oct 2018 19:04)  T(F): 98.1 (13 Oct 2018 12:38), Max: 98.5 (13 Oct 2018 04:19)  HR: 68 (13 Oct 2018 12:38) (62 - 88)  BP: 105/57 (13 Oct 2018 12:38) (105/57 - 146/67)  BP(mean): --  RR: 18 (13 Oct 2018 12:38) (16 - 18)  SpO2: 95% (13 Oct 2018 12:38) (95% - 99%)  CAPILLARY BLOOD GLUCOSE        I&O's Summary    12 Oct 2018 07:01  -  13 Oct 2018 07:00  --------------------------------------------------------  IN: 990 mL / OUT: 525 mL / NET: 465 mL        PHYSICAL EXAM:  GENERAL: NAD, well-developed  HEAD:  Atraumatic, Normocephalic  EYES: EOMI, PERRLA, conjunctiva and sclera clear  NECK: Supple, No JVD  CHEST/LUNG: Clear to auscultation bilaterally; No wheeze  HEART: Regular rate and rhythm; No murmurs, rubs, or gallops  ABDOMEN: Soft, Nontender, distended; Bowel sounds present  EXTREMITIES:  2+ Peripheral Pulses, No clubbing, cyanosis, or edema  PSYCH: AAOx3  NEUROLOGY: non-focal  SKIN: dry mucous membranes    LABS:                        10.1   6.38  )-----------( 234      ( 13 Oct 2018 08:23 )             28.0     10-    124<L>  |  90<L>  |  14  ----------------------------<  96  4.3   |  21<L>  |  0.72    Ca    8.8      13 Oct 2018 06:47  Phos  3.2     10-  Mg     1.8     10-    TPro  7.3  /  Alb  4.5  /  TBili  0.3  /  DBili  x   /  AST  18  /  ALT  17  /  AlkPhos  58  10-12          Urinalysis Basic - ( 12 Oct 2018 12:45 )    Color: Light Yellow / Appearance: Clear / S.007 / pH: x  Gluc: x / Ketone: Negative  / Bili: Negative / Urobili: Negative   Blood: x / Protein: Negative / Nitrite: Negative   Leuk Esterase: Large / RBC: 2 /hpf / WBC 20 /hpf   Sq Epi: x / Non Sq Epi: 1 /hpf / Bacteria: Many        RADIOLOGY & ADDITIONAL TESTS:    Imaging Personally Reviewed:    Consultant(s) Notes Reviewed:      Care Discussed with Consultants/Other Providers:

## 2018-10-13 NOTE — CONSULT NOTE ADULT - ASSESSMENT
89yo female with urinary retention in setting of possible UTI, PVR 400cc  - place zee x48 hour  - tov in 48h, if fails CIC and follow up as outpatient   - repeat urine culture from catheterized sample  - cont abx  - stop ditropan- do not continue until outpatient follow up by urologist   - discussed with Dr. Pereira

## 2018-10-13 NOTE — CONSULT NOTE ADULT - PROBLEM SELECTOR RECOMMENDATION 9
Patient with urinary retention (See below), which is likely contributing to hyponatremia  urine studies noted (low sodium and osm)-- which could be due to decreased osmotic intake  patient with milk at bedside, and encouraged to drink milk rather than water  protein supplementation ordered  defer aggressive treatment of hyponatremia until retention issues addressed

## 2018-10-14 LAB
ANION GAP SERPL CALC-SCNC: 12 MMOL/L — SIGNIFICANT CHANGE UP (ref 5–17)
BUN SERPL-MCNC: 12 MG/DL — SIGNIFICANT CHANGE UP (ref 7–23)
CALCIUM SERPL-MCNC: 8.7 MG/DL — SIGNIFICANT CHANGE UP (ref 8.4–10.5)
CHLORIDE SERPL-SCNC: 94 MMOL/L — LOW (ref 96–108)
CO2 SERPL-SCNC: 22 MMOL/L — SIGNIFICANT CHANGE UP (ref 22–31)
CREAT SERPL-MCNC: 0.6 MG/DL — SIGNIFICANT CHANGE UP (ref 0.5–1.3)
CULTURE RESULTS: NO GROWTH — SIGNIFICANT CHANGE UP
GLUCOSE SERPL-MCNC: 99 MG/DL — SIGNIFICANT CHANGE UP (ref 70–99)
POTASSIUM SERPL-MCNC: 3.8 MMOL/L — SIGNIFICANT CHANGE UP (ref 3.5–5.3)
POTASSIUM SERPL-SCNC: 3.8 MMOL/L — SIGNIFICANT CHANGE UP (ref 3.5–5.3)
SODIUM SERPL-SCNC: 128 MMOL/L — LOW (ref 135–145)
SPECIMEN SOURCE: SIGNIFICANT CHANGE UP

## 2018-10-14 PROCEDURE — 99222 1ST HOSP IP/OBS MODERATE 55: CPT

## 2018-10-14 RX ADMIN — Medication 81 MILLIGRAM(S): at 12:48

## 2018-10-14 RX ADMIN — Medication 0.25 MILLIGRAM(S): at 19:59

## 2018-10-14 RX ADMIN — Medication 0.25 MILLIGRAM(S): at 10:46

## 2018-10-14 RX ADMIN — SODIUM CHLORIDE 30 MILLILITER(S): 9 INJECTION INTRAMUSCULAR; INTRAVENOUS; SUBCUTANEOUS at 21:44

## 2018-10-14 RX ADMIN — PANTOPRAZOLE SODIUM 40 MILLIGRAM(S): 20 TABLET, DELAYED RELEASE ORAL at 05:26

## 2018-10-14 RX ADMIN — HEPARIN SODIUM 5000 UNIT(S): 5000 INJECTION INTRAVENOUS; SUBCUTANEOUS at 05:26

## 2018-10-14 RX ADMIN — GABAPENTIN 300 MILLIGRAM(S): 400 CAPSULE ORAL at 05:26

## 2018-10-14 RX ADMIN — Medication 0.25 MILLIGRAM(S): at 17:30

## 2018-10-14 RX ADMIN — Medication 60 MILLIGRAM(S): at 05:27

## 2018-10-14 RX ADMIN — MIRTAZAPINE 45 MILLIGRAM(S): 45 TABLET, ORALLY DISINTEGRATING ORAL at 21:44

## 2018-10-14 RX ADMIN — CITALOPRAM 20 MILLIGRAM(S): 10 TABLET, FILM COATED ORAL at 12:48

## 2018-10-14 RX ADMIN — GABAPENTIN 300 MILLIGRAM(S): 400 CAPSULE ORAL at 17:30

## 2018-10-14 RX ADMIN — SODIUM CHLORIDE 30 MILLILITER(S): 9 INJECTION INTRAMUSCULAR; INTRAVENOUS; SUBCUTANEOUS at 05:26

## 2018-10-14 RX ADMIN — CEFTRIAXONE 100 GRAM(S): 500 INJECTION, POWDER, FOR SOLUTION INTRAMUSCULAR; INTRAVENOUS at 13:08

## 2018-10-14 RX ADMIN — ZOLPIDEM TARTRATE 5 MILLIGRAM(S): 10 TABLET ORAL at 22:05

## 2018-10-14 RX ADMIN — HEPARIN SODIUM 5000 UNIT(S): 5000 INJECTION INTRAVENOUS; SUBCUTANEOUS at 13:08

## 2018-10-14 RX ADMIN — HEPARIN SODIUM 5000 UNIT(S): 5000 INJECTION INTRAVENOUS; SUBCUTANEOUS at 21:44

## 2018-10-14 RX ADMIN — Medication 5 MILLILITER(S): at 21:44

## 2018-10-14 RX ADMIN — LOSARTAN POTASSIUM 100 MILLIGRAM(S): 100 TABLET, FILM COATED ORAL at 05:26

## 2018-10-14 RX ADMIN — Medication 1 TABLET(S): at 12:47

## 2018-10-14 NOTE — PROGRESS NOTE ADULT - ASSESSMENT
89yo female with urinary retention in setting of possible UTI, PVR 400cc  - Continue zee x48 hour  - tov when 48h completed, if fails [facility]IC and follow up as outpatient   - repeat urine culture from catheterized sample  - cont abx

## 2018-10-14 NOTE — CHART NOTE - NSCHARTNOTEFT_GEN_A_CORE
Upon Nutritional Assessment by the Registered Dietitian your patient was determined to meet criteria / has evidence of the following diagnosis/diagnoses:          [ ]  Mild Protein Calorie Malnutrition        [ ]  Moderate Protein Calorie Malnutrition        [x ] Severe Protein Calorie Malnutrition        [ ] Unspecified Protein Calorie Malnutrition        [ ] Underweight / BMI <19        [ ] Morbid Obesity / BMI > 40      Findings as based on:  [X] Comprehensive nutrition assessment   [X ] Nutrition Focused Physical;   fat depletion, generalized muscle wasting  [X ] Other: sudden weight loss 9% body weight in 1 month, BMI<18       Nutrition Plan/Recommendations:  change diet to Mechanical Soft/chopped;  patient refuses Ensure, provide food preferences/ tolerances, assist with meals, monitor and encourage intake        PROVIDER Section:     By signing this assessment you are acknowledging and agree with the diagnosis/diagnoses assigned by the Registered Dietitian    Comments:

## 2018-10-14 NOTE — PROGRESS NOTE ADULT - SUBJECTIVE AND OBJECTIVE BOX
Patient is a 89y old  Female who presents with a chief complaint of weakness (13 Oct 2018 18:04)      SUBJECTIVE / OVERNIGHT EVENTS:  much more comfortable.  was straight cathed last night. was in urinary retention.    MEDICATIONS  (STANDING):  ALPRAZolam 0.25 milliGRAM(s) Oral three times a day  aspirin enteric coated 81 milliGRAM(s) Oral daily  Biotene Dry Mouth Oral Rinse 5 milliLiter(s) Swish and Spit at bedtime  calcium carbonate   1250 mG (OsCal) 1 Tablet(s) Oral daily  cefTRIAXone   IVPB 1 Gram(s) IV Intermittent every 24 hours  citalopram 20 milliGRAM(s) Oral daily  gabapentin 300 milliGRAM(s) Oral two times a day  heparin  Injectable 5000 Unit(s) SubCutaneous every 8 hours  losartan 100 milliGRAM(s) Oral daily  mirtazapine 45 milliGRAM(s) Oral at bedtime  NIFEdipine XL 60 milliGRAM(s) Oral daily  pantoprazole    Tablet 40 milliGRAM(s) Oral before breakfast  sodium chloride 0.9%. 1000 milliLiter(s) (30 mL/Hr) IV Continuous <Continuous>    MEDICATIONS  (PRN):  acetaminophen   Tablet .. 325 milliGRAM(s) Oral every 6 hours PRN Mild Pain (1 - 3)  zolpidem 5 milliGRAM(s) Oral at bedtime PRN Insomnia      Vital Signs Last 24 Hrs  T(C): 36.8 (14 Oct 2018 09:42), Max: 36.8 (13 Oct 2018 19:03)  T(F): 98.3 (14 Oct 2018 09:42), Max: 98.3 (13 Oct 2018 19:03)  HR: 70 (14 Oct 2018 10:47) (65 - 71)  BP: 106/60 (14 Oct 2018 10:47) (94/55 - 157/73)  BP(mean): --  RR: 18 (14 Oct 2018 10:47) (18 - 18)  SpO2: 97% (14 Oct 2018 10:47) (95% - 97%)  CAPILLARY BLOOD GLUCOSE        I&O's Summary    13 Oct 2018 07:01  -  14 Oct 2018 07:00  --------------------------------------------------------  IN: 1815 mL / OUT: 950 mL / NET: 865 mL    14 Oct 2018 07:01  -  14 Oct 2018 12:06  --------------------------------------------------------  IN: 120 mL / OUT: 0 mL / NET: 120 mL        PHYSICAL EXAM:  GENERAL: NAD, well-developed  HEAD:  Atraumatic, Normocephalic  EYES: EOMI, PERRLA, conjunctiva and sclera clear  NECK: Supple, No JVD  CHEST/LUNG: Clear to auscultation bilaterally; No wheeze  HEART: Regular rate and rhythm; No murmurs, rubs, or gallops  ABDOMEN: Soft, Nontender, Nondistended; Bowel sounds present  EXTREMITIES:  2+ Peripheral Pulses, No clubbing, cyanosis, or edema  PSYCH: AAOx3  NEUROLOGY: non-focal  SKIN: dry mucous memebranes    LABS:                        10.1   6.38  )-----------( 234      ( 13 Oct 2018 08:23 )             28.0     10-14    128<L>  |  94<L>  |  12  ----------------------------<  99  3.8   |  22  |  0.60    Ca    8.7      14 Oct 2018 06:23  Phos  3.2     10-13  Mg     1.8     10-13    TPro  7.3  /  Alb  4.5  /  TBili  0.3  /  DBili  x   /  AST  18  /  ALT  17  /  AlkPhos  58  10-12          Urinalysis Basic - ( 12 Oct 2018 12:45 )    Color: Light Yellow / Appearance: Clear / S.007 / pH: x  Gluc: x / Ketone: Negative  / Bili: Negative / Urobili: Negative   Blood: x / Protein: Negative / Nitrite: Negative   Leuk Esterase: Large / RBC: 2 /hpf / WBC 20 /hpf   Sq Epi: x / Non Sq Epi: 1 /hpf / Bacteria: Many        RADIOLOGY & ADDITIONAL TESTS:    Imaging Personally Reviewed:    Consultant(s) Notes Reviewed:      Care Discussed with Consultants/Other Providers:

## 2018-10-14 NOTE — DIETITIAN INITIAL EVALUATION ADULT. - NS AS NUTRI INTERV MEALS SNACK
change diet to mechanical soft; provide food preferences, monitor, encourage PO intake/Texture-modified diet

## 2018-10-14 NOTE — PROGRESS NOTE ADULT - SUBJECTIVE AND OBJECTIVE BOX
Progress Note    Subjective  Patient seen and examined in AM. Spangler in place and draining clear yellow     Objective  T(F): , Max: 98.4 (10-14-18 @ 12:44)  HR: 85  BP: 106/53  SpO2: 97%    Output     Indwelling Catheter - Urethral: 950 mL    Gen NAD   Spangler draining clear yellow     Cultures:  Urine Cx: contam    Antibiotics:  Ceftriaxone

## 2018-10-14 NOTE — PROGRESS NOTE ADULT - PROBLEM SELECTOR PLAN 2
2/2 overflow incontinence/urinary retention  urology consult noted  maintain zee x 48 hours then TOV  hold ditropan until outpatient followup

## 2018-10-14 NOTE — PROGRESS NOTE ADULT - SUBJECTIVE AND OBJECTIVE BOX
[facility]   [phone]  Dr. Morales (covering for Dr. Cosme)  RENAL FOLLOW UP NOTE  --------------------------------------------------------------------------------  Patient seen and examined.  Resting comfortably.  Less anxious than  yesterday    PAST HISTORY  --------------------------------------------------------------------------------  No significant changes to PMH, PSH, FHx, SHx, unless otherwise noted    ALLERGIES & MEDICATIONS  --------------------------------------------------------------------------------  Allergies    No Known Allergies    Intolerances      Standing Inpatient Medications  ALPRAZolam 0.25 milliGRAM(s) Oral three times a day  aspirin enteric coated 81 milliGRAM(s) Oral daily  Biotene Dry Mouth Oral Rinse 5 milliLiter(s) Swish and Spit at bedtime  calcium carbonate   1250 mG (OsCal) 1 Tablet(s) Oral daily  cefTRIAXone   IVPB 1 Gram(s) IV Intermittent every 24 hours  citalopram 20 milliGRAM(s) Oral daily  gabapentin 300 milliGRAM(s) Oral two times a day  heparin  Injectable 5000 Unit(s) SubCutaneous every 8 hours  losartan 100 milliGRAM(s) Oral daily  mirtazapine 45 milliGRAM(s) Oral at bedtime  NIFEdipine XL 60 milliGRAM(s) Oral daily  pantoprazole    Tablet 40 milliGRAM(s) Oral before breakfast  sodium chloride 0.9%. 1000 milliLiter(s) IV Continuous <Continuous>    PRN Inpatient Medications  acetaminophen   Tablet .. 325 milliGRAM(s) Oral every 6 hours PRN  zolpidem 5 milliGRAM(s) Oral at bedtime PRN      FOCUSED REVIEW OF SYSTEMS  --------------------------------------------------------------------------------  +fatigue  denies chest pain/palpitations  denies SOB/cough  denies abd pain  denies dysuria      VITALS/PHYSICAL EXAM  --------------------------------------------------------------------------------  T(C): 36.8 (10-14-18 @ 09:42), Max: 36.8 (10-13-18 @ 19:03)  HR: 70 (10-14-18 @ 10:47) (65 - 71)  BP: 106/60 (10-14-18 @ 10:47) (94/55 - 157/73)  RR: 18 (10-14-18 @ 10:47) (18 - 18)  SpO2: 97% (10-14-18 @ 10:47) (95% - 97%)  Wt(kg): --  Height (cm): 157.48 (10-12-18 @ 19:04)  Weight (kg): 43.6 (10-12-18 @ 19:04)  BMI (kg/m2): 17.6 (10-12-18 @ 19:04)  BSA (m2): 1.4 (10-12-18 @ 19:04)      10-13-18 @ 07:01  -  10-14-18 @ 07:00  --------------------------------------------------------  IN: 1815 mL / OUT: 950 mL / NET: 865 mL    10-14-18 @ 07:01  -  10-14-18 @ 12:18  --------------------------------------------------------  IN: 120 mL / OUT: 0 mL / NET: 120 mL      Physical Exam:  	Gen: NAD, frail-appearing  	Pulm: CTA B/L ant/lat fields  	CV: RRR, S1S2  	Abd: +BS, soft, nontender/nondistended  	: No suprapubic tenderness.  + zee          Extremity: No cyanosis, no edema  	Neuro: A&O to self, place      LABS/STUDIES  --------------------------------------------------------------------------------              10.1   6.38  >-----------<  234      [10-13-18 @ 08:23]              28.0     128  |  94  |  12  ----------------------------<  99      [10-14-18 @ 06:23]  3.8   |  22  |  0.60        Ca     8.7     [10-14-18 @ 06:23]      Mg     1.8     [10-13-18 @ 06:47]      Phos  3.2     [10-13-18 @ 06:47]    TPro  7.3  /  Alb  4.5  /  TBili  0.3  /  DBili  x   /  AST  18  /  ALT  17  /  AlkPhos  58  [10-12-18 @ 12:25]        Serum Osmolality 263      [10-12-18 @ 13:46]    eGFR if Non African American: 77 mL/min/1.73M2 (10-14 @ 06:23)  eGFR if African American: 90 mL/min/1.73M2 (10-14 @ 06:23)    Creatinine Trend:  SCr 0.60 [10-14 @ 06:23]  SCr 0.72 [10-13 @ 06:47]  SCr 0.73 [10-12 @ 12:25]              Urinalysis - [10-12-18 @ 12:45]      Color Light Yellow / Appearance Clear / SG 1.007 / pH 7.0      Gluc Negative / Ketone Negative  / Bili Negative / Urobili Negative       Blood Negative / Protein Negative / Leuk Est Large / Nitrite Negative      RBC 2 / WBC 20 / Hyaline 0 / Gran  / Sq Epi  / Non Sq Epi 1 / Bacteria Many    Urine Sodium 27      [10-12-18 @ 13:50]  Urine Osmolality 193      [10-12-18 @ 13:50]    Iron 83, TIBC 280, %sat 30      [10-13-18 @ 08:23]  Ferritin 53      [10-13-18 @ 08:23]  TSH 2.06      [10-13-18 @ 08:23]

## 2018-10-14 NOTE — PROGRESS NOTE ADULT - ASSESSMENT
89 yo F PMHx HTN, pacemaker, anxiety, spastic bladder, osteopenia, arrives from [facility] p/w weakness. Pt states her legs feel weak from her shins to her ankles. She denies fevers/chills, CP/palpitations, SOB, N/V, abd pain, dysuria. She has had some urinary leakage recently. She has also lost 10 lbs in the past several weeks. She saw her PMD Dr. Salinas about 2 weeks ago at which point no bloodwork was done and she was told to return in 2 weeks. Pt walks with a walker at baseline and always used her walker.    weakness multifactorial    hyponatremia  pt is very dry on exam  - urine osm and lytes  - iv fluids NS  -  renal consult apreciated  - may need to d/c celexa and remeron    urinary retention  - hold Ditropan    UTI  - ceftriaxone x 3 days  - follow up urine culture    distended abd  - KUB neg    anxiety / depression  - cw celexa  - c/w remeron  - cw xanax change to ATC    HTN  - cw nifedipine    GERD  - cw protonix    dvt px  - heparin    PT eval

## 2018-10-14 NOTE — DIETITIAN INITIAL EVALUATION ADULT. - ENERGY NEEDS
IBW= 110  lbs    89 yo F PMHx HTN, pacemaker, anxiety, spastic bladder, osteopenia, arrives from [facility] p/w multifactoral weakness. Renal consult ordered for Hyponatremia, urinary retention.

## 2018-10-14 NOTE — PROGRESS NOTE ADULT - PROBLEM SELECTOR PLAN 1
likely 2/2 retention  improving s/p zee catheter insertion.  check BMP daily  encourage PO intake  protein supplementation discontinued by dietitian (?)

## 2018-10-15 LAB
ANION GAP SERPL CALC-SCNC: 12 MMOL/L — SIGNIFICANT CHANGE UP (ref 5–17)
BUN SERPL-MCNC: 17 MG/DL — SIGNIFICANT CHANGE UP (ref 7–23)
CALCIUM SERPL-MCNC: 8.9 MG/DL — SIGNIFICANT CHANGE UP (ref 8.4–10.5)
CHLORIDE SERPL-SCNC: 97 MMOL/L — SIGNIFICANT CHANGE UP (ref 96–108)
CO2 SERPL-SCNC: 24 MMOL/L — SIGNIFICANT CHANGE UP (ref 22–31)
CREAT SERPL-MCNC: 0.71 MG/DL — SIGNIFICANT CHANGE UP (ref 0.5–1.3)
GLUCOSE SERPL-MCNC: 126 MG/DL — HIGH (ref 70–99)
POTASSIUM SERPL-MCNC: 4.4 MMOL/L — SIGNIFICANT CHANGE UP (ref 3.5–5.3)
POTASSIUM SERPL-SCNC: 4.4 MMOL/L — SIGNIFICANT CHANGE UP (ref 3.5–5.3)
SODIUM SERPL-SCNC: 133 MMOL/L — LOW (ref 135–145)

## 2018-10-15 RX ADMIN — Medication 5 MILLILITER(S): at 21:36

## 2018-10-15 RX ADMIN — MIRTAZAPINE 45 MILLIGRAM(S): 45 TABLET, ORALLY DISINTEGRATING ORAL at 21:36

## 2018-10-15 RX ADMIN — GABAPENTIN 300 MILLIGRAM(S): 400 CAPSULE ORAL at 06:11

## 2018-10-15 RX ADMIN — HEPARIN SODIUM 5000 UNIT(S): 5000 INJECTION INTRAVENOUS; SUBCUTANEOUS at 06:11

## 2018-10-15 RX ADMIN — LOSARTAN POTASSIUM 100 MILLIGRAM(S): 100 TABLET, FILM COATED ORAL at 06:11

## 2018-10-15 RX ADMIN — Medication 0.25 MILLIGRAM(S): at 19:37

## 2018-10-15 RX ADMIN — CEFTRIAXONE 100 GRAM(S): 500 INJECTION, POWDER, FOR SOLUTION INTRAMUSCULAR; INTRAVENOUS at 12:01

## 2018-10-15 RX ADMIN — HEPARIN SODIUM 5000 UNIT(S): 5000 INJECTION INTRAVENOUS; SUBCUTANEOUS at 21:36

## 2018-10-15 RX ADMIN — Medication 0.25 MILLIGRAM(S): at 16:28

## 2018-10-15 RX ADMIN — Medication 60 MILLIGRAM(S): at 06:11

## 2018-10-15 RX ADMIN — PANTOPRAZOLE SODIUM 40 MILLIGRAM(S): 20 TABLET, DELAYED RELEASE ORAL at 06:11

## 2018-10-15 RX ADMIN — GABAPENTIN 300 MILLIGRAM(S): 400 CAPSULE ORAL at 19:26

## 2018-10-15 RX ADMIN — HEPARIN SODIUM 5000 UNIT(S): 5000 INJECTION INTRAVENOUS; SUBCUTANEOUS at 14:07

## 2018-10-15 RX ADMIN — Medication 1 TABLET(S): at 12:03

## 2018-10-15 RX ADMIN — Medication 81 MILLIGRAM(S): at 12:03

## 2018-10-15 RX ADMIN — CITALOPRAM 20 MILLIGRAM(S): 10 TABLET, FILM COATED ORAL at 12:03

## 2018-10-15 NOTE — PHYSICAL THERAPY INITIAL EVALUATION ADULT - GAIT DEVIATIONS NOTED, PT EVAL
decreased channing/decreased step length/decreased stride length decreased stride length/decreased step length/amb limited by fatigue; pt also very eager to eat./decreased channing

## 2018-10-15 NOTE — DISCHARGE NOTE ADULT - MEDICATION SUMMARY - MEDICATIONS TO CHANGE
I will SWITCH the dose or number of times a day I take the medications listed below when I get home from the hospital:    Ambien 5 mg oral tablet  -- 1 tab(s) by mouth once a day (at bedtime)    Xanax 0.25 mg oral tablet  -- 1 tab(s) by mouth 3 times a day  8 AM, 5 PM, 8 PM

## 2018-10-15 NOTE — PROGRESS NOTE ADULT - ASSESSMENT
89 yo F PMHx HTN, pacemaker, anxiety, spastic bladder, osteopenia, arrives from [facility] p/w weakness. Pt states her legs feel weak from her shins to her ankles. She denies fevers/chills, CP/palpitations, SOB, N/V, abd pain, dysuria. She has had some urinary leakage recently. She has also lost 10 lbs in the past several weeks. She saw her PMD Dr. Salinas about 2 weeks ago at which point no bloodwork was done and she was told to return in 2 weeks. Pt walks with a walker at baseline and always used her walker.    weakness multifactorial    hyponatremia  - urine osm and lytes  - iv fluids NS  -  renal consult apreciated  - may need to d/c celexa and remeron    urinary retention  - hold Ditropan  - TOV    UTI  - ceftriaxone x 3 days  - follow up urine culture    distended abd  - KUB neg    anxiety / depression  - cw celexa  - c/w remeron  - cw xanax change to ATC    HTN  - cw nifedipine    GERD  - cw protonix    dvt px  - heparin    PT eval  - d/c planning to [facility]

## 2018-10-15 NOTE — DISCHARGE NOTE ADULT - CARE PLAN
Principal Discharge DX:	Hyponatremia  Secondary Diagnosis:	Urinary retention Principal Discharge DX:	Hyponatremia  Goal:	stable  Assessment and plan of treatment:	encourage oral feeds and assist as needed  Secondary Diagnosis:	Urinary retention  Assessment and plan of treatment:	seen by urology and had trial of voiding without any urinary retention  monitor for urinary retention-if post voidal residue is more than 250ml then place zee cath and follow up with urology as outpatient Principal Discharge DX:	Hyponatremia  Goal:	stable  Assessment and plan of treatment:	encourage oral feeds and assist as needed  Secondary Diagnosis:	Urinary retention  Assessment and plan of treatment:	seen by urology and had trial of voiding without any urinary retention  monitor for urinary retention-if post voidal residue is more than 250ml then place zee cath and follow up with urology as outpatient  Secondary Diagnosis:	Urinary incontinence  Assessment and plan of treatment:	offer toileting  every 4 hourly  per urology off ditropan to avoid urinary retention  Secondary Diagnosis:	Anxiety  Assessment and plan of treatment:	seen by psychiatrist here and adjusted medication  follow up by psychiatrist at the rehab  follow up by outpatient psychiatrist after discharge  Secondary Diagnosis:	Depression  Assessment and plan of treatment:	continue home medication  follow up psychiatrist

## 2018-10-15 NOTE — DISCHARGE NOTE ADULT - MEDICATION SUMMARY - MEDICATIONS TO TAKE
I will START or STAY ON the medications listed below when I get home from the hospital:    Tylenol 325 mg oral tablet  -- 1 tab(s) by mouth every 6 hours, As Needed  -- Indication: For pain    Aspirin Enteric Coated 81 mg oral delayed release tablet  -- 1 tab(s) by mouth once a day  -- Indication: For anti[platelet    Cozaar 100 mg oral tablet  -- 1 tab(s) by mouth once a day  -- Indication: For cardiac    Os-Nuno 500 (1250 mg calcium carbonate) oral tablet  -- 2 tab(s) by mouth once a day  -- Indication: For calcium supplement    gabapentin 300 mg oral capsule  -- 1 cap(s) by mouth 2 times a day  -- Indication: For pain    mirtazapine 15 mg oral tablet  -- 3 tab(s) by mouth once a day (at bedtime)  -- Indication: For mood    CeleXA 20 mg oral tablet  -- 1 tab(s) by mouth once a day  -- Indication: For Depression    ALPRAZolam 0.25 mg oral tablet  -- 1 tab(s) by mouth 2 times a day at 2 pm and 8pm   -- Indication: For anxiety    ALPRAZolam 0.25 mg oral tablet  -- 1 tab(s) by mouth once a day, As needed, anxiety  -- Indication: For anxiety    Ambien 5 mg oral tablet  -- 1 tab(s) by mouth once a day (at bedtime), As Needed for insomnia  -- Indication: For insomnia    NIFEdipine 60 mg oral tablet, extended release  -- 1 tab(s) by mouth once a day  -- Indication: For cardiac    Biotene Mouthwash oral solution  -- orally once a day (at bedtime)  -- Indication: For Dry mouth    melatonin 3 mg oral tablet  -- 1 tab(s) by mouth once a day (at bedtime)  -- Indication: For insomnia    PriLOSEC 40 mg oral delayed release capsule  -- 1 cap(s) by mouth once a day  -- Indication: For for stomach acid reflux     B 100 Complex oral tablet  -- 1 tab(s) by mouth once a day  -- Indication: For vitamin    Centrum oral tablet  -- 1 tab(s) by mouth once a day  -- Indication: For vitamin    Vitamin B12 1000 mcg oral tablet  -- 1 tab(s) by mouth once a day  -- Indication: For vitamin    Vitamin D3 400 intl units oral tablet  -- 1 tab(s) by mouth once a day  -- Indication: For vitamin    Vitamin C 500 mg oral tablet  -- 1 tab(s) by mouth once a day  -- Indication: For vitamin

## 2018-10-15 NOTE — DISCHARGE NOTE ADULT - PROVIDER TOKENS
ALMA:'[id number]',[id number]' TOKEN:'[id number]:'[id number]',TOKEN:'[id number] TOKEN:'[id number]'

## 2018-10-15 NOTE — PHYSICAL THERAPY INITIAL EVALUATION ADULT - PERTINENT HX OF CURRENT PROBLEM, REHAB EVAL
89 yo F PMHx HTN, pacemaker, anxiety, spastic bladder, osteopenia, arrives from [facility] p/w weakness, + anorexia/ wt loss. Pt found to have hyponatremia, + UTI.

## 2018-10-15 NOTE — PROGRESS NOTE ADULT - ASSESSMENT
89yo female with urinary retention in setting of possible UTI, PVR 400cc  - Trial of void today    -  If trial of void fails, follow up with Dr. Pereira ([phone]) in office following discharge.    - Please contact urology if additional questions arise.

## 2018-10-15 NOTE — DISCHARGE NOTE ADULT - HOSPITAL COURSE
89 yo F PMHx HTN, pacemaker, anxiety, spastic bladder, osteopenia, arrives from [facility] p/w weakness. hyponatremia/ possible UTI/ urinary retention;  seen by renal/ urology;  zee cath placed- TOV 89 yo F PMHx HTN, pacemaker, anxiety, spastic bladder, osteopenia, arrives from [facility] p/w weakness. hyponatremia/ possible UTI/ urinary retention; urine culture no growth; completed antibiotic;  seen by renal/ urology;  zee cath placed- off zee cath and no urinary retention now.  monitor for urinary retention-if post voidal residue is more than 250ml then place zee cath and follow up with urology as outpatient  t 89 yo F PMHx HTN, pacemaker, anxiety, spastic bladder, osteopenia, arrives from [facility] p/w weakness. hyponatremia/ possible UTI/ urinary retention; urine culture no growth; completed antibiotic;  seen by renal/ urology;  zee cath placed- off zee cath and no urinary retention now.  monitor for urinary retention-if post voidal residue is more than 250ml then place zee cath and follow up with urology as outpatient  seen by psychiatrist and adjusted dose of xanax and added  melatonin for insomnia; follow up by psychiatrist at rehab and outpatient follow up.  cleared for discharge to rehab by attending Md;  discussed with pt/family.

## 2018-10-15 NOTE — PHYSICAL THERAPY INITIAL EVALUATION ADULT - BALANCE TRAINING, PT EVAL
Goal: Pt will improve balance one grade to improve performance and safety of transfers and ambulation in 2 weeks.

## 2018-10-15 NOTE — DISCHARGE NOTE ADULT - PLAN OF CARE
stable encourage oral feeds and assist as needed seen by urology and had trial of voiding without any urinary retention  monitor for urinary retention-if post voidal residue is more than 250ml then place zee cath and follow up with urology as outpatient offer toileting  every 4 hourly  per urology off ditropan to avoid urinary retention seen by psychiatrist here and adjusted medication  follow up by psychiatrist at the rehab  follow up by outpatient psychiatrist after discharge continue home medication  follow up psychiatrist

## 2018-10-15 NOTE — PROGRESS NOTE ADULT - SUBJECTIVE AND OBJECTIVE BOX
Subjective:  Patient at baseline this AM. Spangler draining clear yellow urine.     Objectives:  T(C): 36.5 (10-15-18 @ 06:04), Max: 36.9 (10-14-18 @ 21:28)  HR: 61 (10-15-18 @ 06:04) (61 - 65)  BP: 126/71 (10-15-18 @ 06:04) (106/64 - 126/71)  RR: 18 (10-15-18 @ 06:04) (18 - 18)  SpO2: 97% (10-15-18 @ 06:04) (97% - 98%)  Wt(kg): --    10-13 @ 07:01  -  10-14 @ 07:00  --------------------------------------------------------  IN:    Oral Fluid: 600 mL    sodium chloride 0.9%: 825 mL    sodium chloride 0.9%.: 390 mL  Total IN: 1815 mL    OUT:    Indwelling Catheter - Urethral: 950 mL  Total OUT: 950 mL    Total NET: 865 mL      10-14 @ 07:01  -  10-15 @ 06:48  --------------------------------------------------------  IN:    IV PiggyBack: 50 mL    Oral Fluid: 1220 mL    sodium chloride 0.9%.: 720 mL  Total IN: 1990 mL    OUT:    Indwelling Catheter - Urethral: 800 mL  Total OUT: 800 mL    Total NET: 1190 mL          Physcial Exam  GENERAL: NAD, well-developed  ABDOMEN: Soft, Nontender, Nondistended;    LABS:                        10.1   6.38  )-----------( 234      ( 13 Oct 2018 08:23 )             28.0     10-14    128<L>  |  94<L>  |  12  ----------------------------<  99  3.8   |  22  |  0.60    Ca    8.7      14 Oct 2018 06:23

## 2018-10-15 NOTE — DISCHARGE NOTE ADULT - PATIENT PORTAL LINK FT
You can access the [facility] Patient Portal, offered by [facility], by registering with the following website: [email or web address]

## 2018-10-15 NOTE — DISCHARGE NOTE ADULT - MEDICATION SUMMARY - MEDICATIONS TO STOP TAKING
I will STOP taking the medications listed below when I get home from the hospital:    oxybutynin 10 mg/24 hr oral tablet, extended release  -- 2 tab(s) by mouth once a day    mupirocin 2% topical ointment  -- Apply on skin to heels once a day

## 2018-10-15 NOTE — DISCHARGE NOTE ADULT - CARE PROVIDERS DIRECT ADDRESSES
,[email or web address],[email or web address],DirectAddress_Unknown,DirectAddress_Unknown ,[email or web address],DirectAddress_Unknown,DirectAddress_Unknown,DirectAddress_Unknown

## 2018-10-15 NOTE — PROGRESS NOTE ADULT - SUBJECTIVE AND OBJECTIVE BOX
Patient is a 89y old  Female who presents with a chief complaint of weakness (15 Oct 2018 12:13)      SUBJECTIVE / OVERNIGHT EVENTS: anxious    MEDICATIONS  (STANDING):  ALPRAZolam 0.25 milliGRAM(s) Oral three times a day  aspirin enteric coated 81 milliGRAM(s) Oral daily  Biotene Dry Mouth Oral Rinse 5 milliLiter(s) Swish and Spit at bedtime  calcium carbonate   1250 mG (OsCal) 1 Tablet(s) Oral daily  cefTRIAXone   IVPB 1 Gram(s) IV Intermittent every 24 hours  citalopram 20 milliGRAM(s) Oral daily  gabapentin 300 milliGRAM(s) Oral two times a day  heparin  Injectable 5000 Unit(s) SubCutaneous every 8 hours  losartan 100 milliGRAM(s) Oral daily  mirtazapine 45 milliGRAM(s) Oral at bedtime  NIFEdipine XL 60 milliGRAM(s) Oral daily  pantoprazole    Tablet 40 milliGRAM(s) Oral before breakfast  sodium chloride 0.9%. 1000 milliLiter(s) (30 mL/Hr) IV Continuous <Continuous>    MEDICATIONS  (PRN):  acetaminophen   Tablet .. 325 milliGRAM(s) Oral every 6 hours PRN Mild Pain (1 - 3)  zolpidem 5 milliGRAM(s) Oral at bedtime PRN Insomnia      Vital Signs Last 24 Hrs  T(C): 36.7 (15 Oct 2018 11:45), Max: 36.9 (14 Oct 2018 21:28)  T(F): 98 (15 Oct 2018 11:45), Max: 98.4 (14 Oct 2018 21:28)  HR: 67 (15 Oct 2018 11:45) (61 - 67)  BP: 113/56 (15 Oct 2018 11:45) (106/64 - 126/71)  BP(mean): --  RR: 18 (15 Oct 2018 11:45) (18 - 18)  SpO2: 97% (15 Oct 2018 11:45) (97% - 98%)  CAPILLARY BLOOD GLUCOSE        I&O's Summary    14 Oct 2018 07:01  -  15 Oct 2018 07:00  --------------------------------------------------------  IN: 1990 mL / OUT: 1850 mL / NET: 140 mL        PHYSICAL EXAM:  GENERAL: NAD, well-developed  HEAD:  Atraumatic, Normocephalic  EYES: EOMI, PERRLA, conjunctiva and sclera clear  NECK: Supple, No JVD  CHEST/LUNG: Clear to auscultation bilaterally; No wheeze  HEART: Regular rate and rhythm; No murmurs, rubs, or gallops  ABDOMEN: Soft, Nontender, Nondistended; Bowel sounds present  EXTREMITIES:  2+ Peripheral Pulses, No clubbing, cyanosis, or edema  PSYCH: AAOx3  NEUROLOGY: non-focal  SKIN: No rashes or lesions    LABS:    10-15    133<L>  |  97  |  17  ----------------------------<  126<H>  4.4   |  24  |  0.71    Ca    8.9      15 Oct 2018 14:25                RADIOLOGY & ADDITIONAL TESTS:    Imaging Personally Reviewed:    Consultant(s) Notes Reviewed:      Care Discussed with Consultants/Other Providers:

## 2018-10-16 VITALS
RESPIRATION RATE: 18 BRPM | HEART RATE: 68 BPM | SYSTOLIC BLOOD PRESSURE: 126 MMHG | TEMPERATURE: 98 F | DIASTOLIC BLOOD PRESSURE: 56 MMHG | OXYGEN SATURATION: 98 %

## 2018-10-16 DIAGNOSIS — F41.1 GENERALIZED ANXIETY DISORDER: ICD-10-CM

## 2018-10-16 DIAGNOSIS — F32.9 MAJOR DEPRESSIVE DISORDER, SINGLE EPISODE, UNSPECIFIED: ICD-10-CM

## 2018-10-16 LAB
ANION GAP SERPL CALC-SCNC: 9 MMOL/L — SIGNIFICANT CHANGE UP (ref 5–17)
BUN SERPL-MCNC: 24 MG/DL — HIGH (ref 7–23)
CALCIUM SERPL-MCNC: 8.3 MG/DL — LOW (ref 8.4–10.5)
CHLORIDE SERPL-SCNC: 104 MMOL/L — SIGNIFICANT CHANGE UP (ref 96–108)
CO2 SERPL-SCNC: 23 MMOL/L — SIGNIFICANT CHANGE UP (ref 22–31)
CREAT SERPL-MCNC: 0.77 MG/DL — SIGNIFICANT CHANGE UP (ref 0.5–1.3)
GLUCOSE SERPL-MCNC: 92 MG/DL — SIGNIFICANT CHANGE UP (ref 70–99)
POTASSIUM SERPL-MCNC: 4.4 MMOL/L — SIGNIFICANT CHANGE UP (ref 3.5–5.3)
POTASSIUM SERPL-SCNC: 4.4 MMOL/L — SIGNIFICANT CHANGE UP (ref 3.5–5.3)
SODIUM SERPL-SCNC: 136 MMOL/L — SIGNIFICANT CHANGE UP (ref 135–145)

## 2018-10-16 PROCEDURE — 99222 1ST HOSP IP/OBS MODERATE 55: CPT | Mod: GC

## 2018-10-16 RX ORDER — MUPIROCIN 20 MG/G
1 OINTMENT TOPICAL
Qty: 0 | Refills: 0 | COMMUNITY

## 2018-10-16 RX ORDER — ALPRAZOLAM 0.25 MG
0.25 TABLET ORAL
Qty: 0 | Refills: 0 | Status: DISCONTINUED | OUTPATIENT
Start: 2018-10-16 | End: 2018-10-16

## 2018-10-16 RX ORDER — ASPIRIN/CALCIUM CARB/MAGNESIUM 324 MG
1 TABLET ORAL
Qty: 0 | Refills: 0 | COMMUNITY

## 2018-10-16 RX ORDER — ALPRAZOLAM 0.25 MG
0.25 TABLET ORAL DAILY
Qty: 0 | Refills: 0 | Status: DISCONTINUED | OUTPATIENT
Start: 2018-10-16 | End: 2018-10-16

## 2018-10-16 RX ORDER — LANOLIN ALCOHOL/MO/W.PET/CERES
1 CREAM (GRAM) TOPICAL
Qty: 0 | Refills: 0 | DISCHARGE
Start: 2018-10-16

## 2018-10-16 RX ORDER — ALPRAZOLAM 0.25 MG
1 TABLET ORAL
Qty: 0 | Refills: 0 | DISCHARGE
Start: 2018-10-16

## 2018-10-16 RX ORDER — SALIVA SUBSTITUTE COMB NO.11 351 MG
15 POWDER IN PACKET (EA) MUCOUS MEMBRANE
Qty: 0 | Refills: 0 | COMMUNITY

## 2018-10-16 RX ORDER — LANOLIN ALCOHOL/MO/W.PET/CERES
3 CREAM (GRAM) TOPICAL AT BEDTIME
Qty: 0 | Refills: 0 | Status: DISCONTINUED | OUTPATIENT
Start: 2018-10-16 | End: 2018-10-16

## 2018-10-16 RX ORDER — ASCORBIC ACID 60 MG
1 TABLET,CHEWABLE ORAL
Qty: 0 | Refills: 0 | COMMUNITY

## 2018-10-16 RX ADMIN — Medication 0.25 MILLIGRAM(S): at 15:19

## 2018-10-16 RX ADMIN — CITALOPRAM 20 MILLIGRAM(S): 10 TABLET, FILM COATED ORAL at 11:17

## 2018-10-16 RX ADMIN — Medication 60 MILLIGRAM(S): at 05:37

## 2018-10-16 RX ADMIN — LOSARTAN POTASSIUM 100 MILLIGRAM(S): 100 TABLET, FILM COATED ORAL at 05:37

## 2018-10-16 RX ADMIN — CEFTRIAXONE 100 GRAM(S): 500 INJECTION, POWDER, FOR SOLUTION INTRAMUSCULAR; INTRAVENOUS at 13:11

## 2018-10-16 RX ADMIN — GABAPENTIN 300 MILLIGRAM(S): 400 CAPSULE ORAL at 05:37

## 2018-10-16 RX ADMIN — Medication 81 MILLIGRAM(S): at 11:18

## 2018-10-16 RX ADMIN — HEPARIN SODIUM 5000 UNIT(S): 5000 INJECTION INTRAVENOUS; SUBCUTANEOUS at 05:37

## 2018-10-16 RX ADMIN — SODIUM CHLORIDE 30 MILLILITER(S): 9 INJECTION INTRAMUSCULAR; INTRAVENOUS; SUBCUTANEOUS at 03:04

## 2018-10-16 RX ADMIN — HEPARIN SODIUM 5000 UNIT(S): 5000 INJECTION INTRAVENOUS; SUBCUTANEOUS at 13:12

## 2018-10-16 RX ADMIN — Medication 1 TABLET(S): at 12:32

## 2018-10-16 RX ADMIN — PANTOPRAZOLE SODIUM 40 MILLIGRAM(S): 20 TABLET, DELAYED RELEASE ORAL at 05:37

## 2018-10-16 NOTE — PROVIDER CONTACT NOTE (OTHER) - ACTION/TREATMENT ORDERED:
Continue to encourage patient to void.
Continue to encourage pt to void.
To obtain urine culture specimen with zee catheter insertion.

## 2018-10-16 NOTE — PROGRESS NOTE ADULT - SUBJECTIVE AND OBJECTIVE BOX
Subjective:  Patient had eze removed yesterday. Voiding but with high residual overnight. Denies discomfort.    Objectives:  T(C): 36.4 (10-16-18 @ 04:30), Max: 36.7 (10-15-18 @ 20:31)  HR: 57 (10-16-18 @ 04:30) (57 - 62)  BP: 114/6 (10-16-18 @ 04:30) (107/55 - 114/6)  RR: 18 (10-16-18 @ 04:30) (18 - 18)  SpO2: 97% (10-16-18 @ 04:30) (97% - 97%)  Wt(kg): --    10-14 @ 07:01  -  10-15 @ 07:00  --------------------------------------------------------  IN:    IV PiggyBack: 50 mL    Oral Fluid: 1220 mL    sodium chloride 0.9%.: 720 mL  Total IN: 1990 mL    OUT:    Indwelling Catheter - Urethral: 1850 mL  Total OUT: 1850 mL    Total NET: 140 mL      10-15 @ 07:01  -  10-16 @ 06:34  --------------------------------------------------------  IN:    IV PiggyBack: 50 mL    Oral Fluid: 960 mL    sodium chloride 0.9%.: 360 mL  Total IN: 1370 mL    OUT:    Voided: 200 mL  Total OUT: 200 mL    Total NET: 1170 mL          Physcial Exam  GENERAL: NAD, well-developed  ABDOMEN: Soft, Nontender, Nondistended;    LABS:    10-15    133<L>  |  97  |  17  ----------------------------<  126<H>  4.4   |  24  |  0.71    Ca    8.9      15 Oct 2018 14:25

## 2018-10-16 NOTE — BEHAVIORAL HEALTH ASSESSMENT NOTE - NSBHCHARTREVIEWVS_PSY_A_CORE FT
Vital Signs Last 24 Hrs  T(C): 36.4 (16 Oct 2018 04:30), Max: 36.7 (15 Oct 2018 11:45)  T(F): 97.6 (16 Oct 2018 04:30), Max: 98.1 (15 Oct 2018 20:31)  HR: 57 (16 Oct 2018 04:30) (57 - 67)  BP: 114/6 (16 Oct 2018 04:30) (107/55 - 114/6)  RR: 18 (16 Oct 2018 04:30) (18 - 18)  SpO2: 97% (16 Oct 2018 04:30) (97% - 97%)

## 2018-10-16 NOTE — BEHAVIORAL HEALTH ASSESSMENT NOTE - NSBHCONSULTMEDS_PSY_A_CORE FT
Celexa 20 mg QD  Remeron 45 QHS Celexa 20 mg QD  Remeron 45 QHS  Xanax 0.125 mg PO AM, 0.25 Noon and PM Celexa 20 mg QD  Remeron 45 QHS  Xanax 0.25mg PO bid standing and 0.25mg PO daily PRN anxiety

## 2018-10-16 NOTE — PROGRESS NOTE ADULT - ASSESSMENT
89 yo F PMHx HTN, pacemaker, anxiety, spastic bladder, osteopenia, arrives from [facility] p/w weakness. Pt states her legs feel weak from her shins to her ankles. She denies fevers/chills, CP/palpitations, SOB, N/V, abd pain, dysuria. She has had some urinary leakage recently. She has also lost 10 lbs in the past several weeks. She saw her PMD Dr. Salinas about 2 weeks ago at which point no bloodwork was done and she was told to return in 2 weeks. Pt walks with a walker at baseline and always used her walker.    weakness multifactorial    hyponatremia  - resolved  -  renal consult apreciated    urinary retention  - hold Ditropan  - urinating freely    UTI  - ceftriaxone x 3 days completed    anxiety / depression  - cw celexa  - c/w remeron  - cw xanax change to ATC  - seen by psych    HTN  - cw nifedipine    GERD  - cw protonix    dvt px  - heparin    PT eval  - d/c planning to [facility]

## 2018-10-16 NOTE — BEHAVIORAL HEALTH ASSESSMENT NOTE - CASE SUMMARY
89F domiciled in [facility], , with PPHx depressive disorder and TYLER, no prior psych admissions or SA/SIB, follows with OP psychiatrist, no active substance abuse, PMH significant for HTN, pacemaker, anxiety, spastic bladder, osteopenia a/w hyponatremia and weakness, c/o increased anxiety over the last 2 weeks.  States her friends have passed away and she is feeling anxious, worried about her health, worried about being dependent on others.  States her DARRYL organizes and dispenses her meds.  Denies SI/HI.  Denies AVH or paranoia, denies substance abuse.  Sleeping poorly in the hospital.  AOx3, attentive.  Dx: Depressive d/o, TYLER.  Recs: 1. C/w home dose Celexa and Remeron.  Decrease Xanax to 0.25mg PO bid and 0.25mg PO daily PRN anxiety. 2. Start Melatonin 3mg PO qHS.  Make Ambien 5mg PO qHS PRN insomnia. 3. Minimize use of benzos, opioids, anticholinergics, or other deliriogenic agents when possible.  Maintain sleep wake cycle.  Provide frequent reorientation and redirection.  Family member at bedside if possible. Assess for need for glasses and hearing aid (if applicable). 4. Pt does not meet criteria for psychiatric hospitalization.  Recommend outpt psych f/u at [facility] after d/c- [phone].

## 2018-10-16 NOTE — BEHAVIORAL HEALTH ASSESSMENT NOTE - NSBHCHARTREVIEWLAB_PSY_A_CORE FT
10-16    136  |  104  |  24<H>  ----------------------------<  92  4.4   |  23  |  0.77    Ca    8.3<L>      16 Oct 2018 07:17

## 2018-10-16 NOTE — BEHAVIORAL HEALTH ASSESSMENT NOTE - HPI (INCLUDE ILLNESS QUALITY, SEVERITY, DURATION, TIMING, CONTEXT, MODIFYING FACTORS, ASSOCIATED SIGNS AND SYMPTOMS)
Patient is a 89 year old  female currently admitted for UTI and hyponatremia secondary to urinary retention. Patient has PMH of depression, anxiety, HTN, pacemaker, spastic bladder, osteopenia. Patient states she feels frightened because she cannot take care of herself. States she feels hopeless and depressed. She is unable to get dressed, she has trouble moving around and going to the bathroom. Ambulates with a walker. She has trouble sleeping without ambien since she was young. Does not smoke, drinks 1 glass of wine nightly with dinner. No suicidal or homicidal ideation. Reports losing about 10 lbs in the past several months. All of her friends have passed away and she only has a nephew that she speaks with. Patient is a 89 year old  female currently admitted for UTI and hyponatremia secondary to urinary retention. Patient has PMH of depression, anxiety, HTN, pacemaker, spastic bladder, osteopenia. Patient states she feels frightened because she cannot take care of herself. States she feels hopeless and depressed, worse over the past several months. She is unable to get dressed, she has trouble moving around and going to the bathroom. Ambulates with a walker. She has trouble sleeping without ambien since she was young. Does not smoke, drinks 1 glass of wine nightly with dinner. No suicidal or homicidal ideation. Reports losing about 10 lbs in the past several months. All of her friends have passed away and she only has a nephew that she speaks with. Patient is a 89 year old  female currently admitted for UTI and hyponatremia secondary to urinary retention. Patient has PMH of depression, anxiety, HTN, pacemaker, spastic bladder, osteopenia. Patient states she feels frightened because she cannot take care of herself. States she feels hopeless and depressed, worse over the past several months. She is unable to get dressed, she has trouble moving around and going to the bathroom. Ambulates with a walker. She has trouble sleeping without ambien since she was young. Does not smoke, drinks 1 glass of wine nightly with dinner. No suicidal or homicidal ideation. Reports losing about 10 lbs in the past several months. All of her friends have passed away and she only has a nephew that she speaks with.  Spoke with nephew Brendon, symptoms started about 2-3 weeks ago where she had no energy and did not feel well. 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 Patient is a 89 year old  female currently admitted for UTI and hyponatremia secondary to urinary retention. Patient has PMH of depression, anxiety, HTN, pacemaker, spastic bladder, osteopenia. Patient states she feels frightened because she cannot take care of herself. States she feels hopeless and depressed, worse over the past several months. She is unable to get dressed, she has trouble moving around and going to the bathroom. Ambulates with a walker. She has trouble sleeping without ambien since she was young. Does not smoke, drinks 1 glass of wine nightly with dinner. No suicidal or homicidal ideation. Reports losing about 10 lbs in the past several months. All of her friends have passed away and she only has a nephew that she speaks with.  Spoke with nephew Brendon, symptoms started about 2-3 weeks ago where she had no energy and did not feel well. Has been in a women's residence for 60 years and was transferred to [facility] in 2011. 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  No SI/HI, AVH, or paranoia.

## 2018-10-16 NOTE — PROGRESS NOTE ADULT - ASSESSMENT
89yo female with urinary retention in setting of possible UTI, PVR 400cc  -  If post void residual remains elevated (>250), replace in-dwelling zee. Patient may be discharged with zee.    - Follow up with Dr. Pereira ([phone]) in office following discharge.    - Please contact urology if additional questions arise.

## 2018-10-16 NOTE — PROGRESS NOTE ADULT - PROVIDER SPECIALTY LIST ADULT
Internal Medicine
Nephrology
Urology
Internal Medicine

## 2018-10-16 NOTE — BEHAVIORAL HEALTH ASSESSMENT NOTE - SUMMARY
Patient is a 89 year old  female currently admitted for UTI and hyponatremia secondary to urinary retention. Patient has PMH of depression, anxiety, HTN, pacemaker, spastic bladder, osteopenia. Patient states she feels frightened because she cannot take care of herself. States she feels hopeless and depressed. She is unable to get dressed, she has trouble moving around and going to the bathroom. Ambulates with a walker. She has trouble sleeping without ambien since she was young. Does not smoke, drinks 1 glass of wine nightly with dinner. No suicidal or homicidal ideation. Reports losing about 10 lbs in the past several months. All of her friends have passed away and she only has a nephew that she speaks with. Patient is a 89 year old  female currently admitted for UTI and hyponatremia secondary to urinary retention. Patient has PMH of depression, anxiety, HTN, pacemaker, spastic bladder, osteopenia. Patient states she feels frightened because she cannot take care of herself. States she feels hopeless and depressed, worse over the past several months. She is unable to get dressed, she has trouble moving around and going to the bathroom. Ambulates with a walker. She has trouble sleeping without ambien since she was young. Does not smoke, drinks 1 glass of wine nightly with dinner. No suicidal or homicidal ideation. Reports losing about 10 lbs in the past several months. All of her friends have passed away and she only has a nephew that she speaks with. Patient is a 89 year old  female currently admitted for UTI and hyponatremia secondary to urinary retention. Patient has PMH of depression, anxiety, HTN, pacemaker, spastic bladder, osteopenia. Patient states she feels frightened because she cannot take care of herself. States she feels hopeless and depressed, worse over the past several months. She is unable to get dressed, she has trouble moving around and going to the bathroom. Ambulates with a walker. She has trouble sleeping without Ambien since she was young. Does not smoke, drinks 1 glass of wine nightly with dinner. No suicidal or homicidal ideation. Reports losing about 10 lbs in the past several months. All of her friends have passed away and she only has a nephew that she speaks with.

## 2018-10-16 NOTE — PROGRESS NOTE ADULT - SUBJECTIVE AND OBJECTIVE BOX
Patient is a 89y old  Female who presents with a chief complaint of weakness (16 Oct 2018 06:33)      SUBJECTIVE / OVERNIGHT EVENTS: pt is paranoid and anxious.    MEDICATIONS  (STANDING):  ALPRAZolam 0.25 milliGRAM(s) Oral <User Schedule>  aspirin enteric coated 81 milliGRAM(s) Oral daily  Biotene Dry Mouth Oral Rinse 5 milliLiter(s) Swish and Spit at bedtime  calcium carbonate   1250 mG (OsCal) 1 Tablet(s) Oral daily  cefTRIAXone   IVPB 1 Gram(s) IV Intermittent every 24 hours  citalopram 20 milliGRAM(s) Oral daily  gabapentin 300 milliGRAM(s) Oral two times a day  heparin  Injectable 5000 Unit(s) SubCutaneous every 8 hours  losartan 100 milliGRAM(s) Oral daily  melatonin 3 milliGRAM(s) Oral at bedtime  mirtazapine 45 milliGRAM(s) Oral at bedtime  NIFEdipine XL 60 milliGRAM(s) Oral daily  pantoprazole    Tablet 40 milliGRAM(s) Oral before breakfast    MEDICATIONS  (PRN):  acetaminophen   Tablet .. 325 milliGRAM(s) Oral every 6 hours PRN Mild Pain (1 - 3)  ALPRAZolam 0.25 milliGRAM(s) Oral daily PRN anxiety  zolpidem 5 milliGRAM(s) Oral at bedtime PRN Insomnia      Vital Signs Last 24 Hrs  T(C): 36.9 (16 Oct 2018 12:57), Max: 36.9 (16 Oct 2018 12:57)  T(F): 98.5 (16 Oct 2018 12:57), Max: 98.5 (16 Oct 2018 12:57)  HR: 68 (16 Oct 2018 12:57) (57 - 68)  BP: 126/56 (16 Oct 2018 12:57) (107/55 - 126/56)  BP(mean): --  RR: 18 (16 Oct 2018 12:57) (18 - 18)  SpO2: 98% (16 Oct 2018 12:57) (97% - 98%)  CAPILLARY BLOOD GLUCOSE        I&O's Summary    15 Oct 2018 07:01  -  16 Oct 2018 07:00  --------------------------------------------------------  IN: 1370 mL / OUT: 200 mL / NET: 1170 mL    16 Oct 2018 07:01  -  16 Oct 2018 13:21  --------------------------------------------------------  IN: 180 mL / OUT: 0 mL / NET: 180 mL        PHYSICAL EXAM:  GENERAL: NAD, well-developed  HEAD:  Atraumatic, Normocephalic  EYES: EOMI, PERRLA, conjunctiva and sclera clear  NECK: Supple, No JVD  CHEST/LUNG: Clear to auscultation bilaterally; No wheeze  HEART: Regular rate and rhythm; No murmurs, rubs, or gallops  ABDOMEN: Soft, Nontender, Nondistended; Bowel sounds present  EXTREMITIES:  2+ Peripheral Pulses, No clubbing, cyanosis, or edema  PSYCH: AAOx3  NEUROLOGY: non-focal  SKIN: No rashes or lesions    LABS:    10-16    136  |  104  |  24<H>  ----------------------------<  92  4.4   |  23  |  0.77    Ca    8.3<L>      16 Oct 2018 07:17                RADIOLOGY & ADDITIONAL TESTS:    Imaging Personally Reviewed:    Consultant(s) Notes Reviewed:      Care Discussed with Consultants/Other Providers:

## 2018-11-11 PROCEDURE — 85027 COMPLETE CBC AUTOMATED: CPT

## 2018-11-11 PROCEDURE — 74018 RADEX ABDOMEN 1 VIEW: CPT

## 2018-11-11 PROCEDURE — 83930 ASSAY OF BLOOD OSMOLALITY: CPT

## 2018-11-11 PROCEDURE — 71045 X-RAY EXAM CHEST 1 VIEW: CPT

## 2018-11-11 PROCEDURE — 84443 ASSAY THYROID STIM HORMONE: CPT

## 2018-11-11 PROCEDURE — 82746 ASSAY OF FOLIC ACID SERUM: CPT

## 2018-11-11 PROCEDURE — 83735 ASSAY OF MAGNESIUM: CPT

## 2018-11-11 PROCEDURE — 87086 URINE CULTURE/COLONY COUNT: CPT

## 2018-11-11 PROCEDURE — 82728 ASSAY OF FERRITIN: CPT

## 2018-11-11 PROCEDURE — 93005 ELECTROCARDIOGRAM TRACING: CPT

## 2018-11-11 PROCEDURE — 83935 ASSAY OF URINE OSMOLALITY: CPT

## 2018-11-11 PROCEDURE — 82607 VITAMIN B-12: CPT

## 2018-11-11 PROCEDURE — 96374 THER/PROPH/DIAG INJ IV PUSH: CPT

## 2018-11-11 PROCEDURE — 83550 IRON BINDING TEST: CPT

## 2018-11-11 PROCEDURE — 81001 URINALYSIS AUTO W/SCOPE: CPT

## 2018-11-11 PROCEDURE — 97161 PT EVAL LOW COMPLEX 20 MIN: CPT

## 2018-11-11 PROCEDURE — 84100 ASSAY OF PHOSPHORUS: CPT

## 2018-11-11 PROCEDURE — 80048 BASIC METABOLIC PNL TOTAL CA: CPT

## 2018-11-11 PROCEDURE — 84300 ASSAY OF URINE SODIUM: CPT

## 2018-11-11 PROCEDURE — 99285 EMERGENCY DEPT VISIT HI MDM: CPT | Mod: 25

## 2018-11-11 PROCEDURE — 80053 COMPREHEN METABOLIC PANEL: CPT

## 2018-11-17 ENCOUNTER — INPATIENT (INPATIENT)
Facility: HOSPITAL | Age: 83
LOS: 8 days | Discharge: NURSING, REHAB OR LONG-TERM CARE | DRG: 336 | End: 2018-11-26
Attending: SURGERY | Admitting: SURGERY
Payer: MEDICARE

## 2018-11-17 VITALS
RESPIRATION RATE: 18 BRPM | HEART RATE: 91 BPM | HEIGHT: 61 IN | SYSTOLIC BLOOD PRESSURE: 172 MMHG | OXYGEN SATURATION: 95 % | WEIGHT: 100.09 LBS | DIASTOLIC BLOOD PRESSURE: 98 MMHG

## 2018-11-17 DIAGNOSIS — Z90.710 ACQUIRED ABSENCE OF BOTH CERVIX AND UTERUS: Chronic | ICD-10-CM

## 2018-11-17 DIAGNOSIS — K56.609 UNSPECIFIED INTESTINAL OBSTRUCTION, UNSPECIFIED AS TO PARTIAL VERSUS COMPLETE OBSTRUCTION: ICD-10-CM

## 2018-11-17 LAB
ALBUMIN SERPL ELPH-MCNC: 4.8 G/DL — SIGNIFICANT CHANGE UP (ref 3.3–5)
ALP SERPL-CCNC: 61 U/L — SIGNIFICANT CHANGE UP (ref 40–120)
ALT FLD-CCNC: 13 U/L — SIGNIFICANT CHANGE UP (ref 10–45)
ANION GAP SERPL CALC-SCNC: 16 MMOL/L — SIGNIFICANT CHANGE UP (ref 5–17)
APPEARANCE UR: CLEAR — SIGNIFICANT CHANGE UP
APTT BLD: 25.9 SEC — LOW (ref 27.5–36.3)
AST SERPL-CCNC: 17 U/L — SIGNIFICANT CHANGE UP (ref 10–40)
BACTERIA # UR AUTO: NEGATIVE — SIGNIFICANT CHANGE UP
BASOPHILS # BLD AUTO: 0 K/UL — SIGNIFICANT CHANGE UP (ref 0–0.2)
BASOPHILS NFR BLD AUTO: 0 % — SIGNIFICANT CHANGE UP (ref 0–2)
BILIRUB SERPL-MCNC: 0.6 MG/DL — SIGNIFICANT CHANGE UP (ref 0.2–1.2)
BILIRUB UR-MCNC: NEGATIVE — SIGNIFICANT CHANGE UP
BLD GP AB SCN SERPL QL: NEGATIVE — SIGNIFICANT CHANGE UP
BUN SERPL-MCNC: 59 MG/DL — HIGH (ref 7–23)
CALCIUM SERPL-MCNC: 10.1 MG/DL — SIGNIFICANT CHANGE UP (ref 8.4–10.5)
CHLORIDE SERPL-SCNC: 94 MMOL/L — LOW (ref 96–108)
CO2 SERPL-SCNC: 33 MMOL/L — HIGH (ref 22–31)
COLOR SPEC: YELLOW — SIGNIFICANT CHANGE UP
CREAT SERPL-MCNC: 1.15 MG/DL — SIGNIFICANT CHANGE UP (ref 0.5–1.3)
DIFF PNL FLD: NEGATIVE — SIGNIFICANT CHANGE UP
EOSINOPHIL # BLD AUTO: 0.1 K/UL — SIGNIFICANT CHANGE UP (ref 0–0.5)
EOSINOPHIL NFR BLD AUTO: 0.5 % — SIGNIFICANT CHANGE UP (ref 0–6)
EPI CELLS # UR: 1 /HPF — SIGNIFICANT CHANGE UP
GAS PNL BLDV: SIGNIFICANT CHANGE UP
GLUCOSE SERPL-MCNC: 140 MG/DL — HIGH (ref 70–99)
GLUCOSE UR QL: NEGATIVE — SIGNIFICANT CHANGE UP
HCT VFR BLD CALC: 36.5 % — SIGNIFICANT CHANGE UP (ref 34.5–45)
HGB BLD-MCNC: 12.3 G/DL — SIGNIFICANT CHANGE UP (ref 11.5–15.5)
HYALINE CASTS # UR AUTO: 1 /LPF — SIGNIFICANT CHANGE UP (ref 0–2)
INR BLD: 1 RATIO — SIGNIFICANT CHANGE UP (ref 0.88–1.16)
KETONES UR-MCNC: SIGNIFICANT CHANGE UP
LEUKOCYTE ESTERASE UR-ACNC: NEGATIVE — SIGNIFICANT CHANGE UP
LIDOCAIN IGE QN: 38 U/L — SIGNIFICANT CHANGE UP (ref 7–60)
LYMPHOCYTES # BLD AUTO: 0.6 K/UL — LOW (ref 1–3.3)
LYMPHOCYTES # BLD AUTO: 5.2 % — LOW (ref 13–44)
MCHC RBC-ENTMCNC: 31.6 PG — SIGNIFICANT CHANGE UP (ref 27–34)
MCHC RBC-ENTMCNC: 33.8 GM/DL — SIGNIFICANT CHANGE UP (ref 32–36)
MCV RBC AUTO: 93.6 FL — SIGNIFICANT CHANGE UP (ref 80–100)
MONOCYTES # BLD AUTO: 0.8 K/UL — SIGNIFICANT CHANGE UP (ref 0–0.9)
MONOCYTES NFR BLD AUTO: 6.6 % — SIGNIFICANT CHANGE UP (ref 2–14)
NEUTROPHILS # BLD AUTO: 10.3 K/UL — HIGH (ref 1.8–7.4)
NEUTROPHILS NFR BLD AUTO: 87.7 % — HIGH (ref 43–77)
NITRITE UR-MCNC: NEGATIVE — SIGNIFICANT CHANGE UP
PH UR: 6.5 — SIGNIFICANT CHANGE UP (ref 5–8)
PLATELET # BLD AUTO: 230 K/UL — SIGNIFICANT CHANGE UP (ref 150–400)
POTASSIUM SERPL-MCNC: 3.9 MMOL/L — SIGNIFICANT CHANGE UP (ref 3.5–5.3)
POTASSIUM SERPL-SCNC: 3.9 MMOL/L — SIGNIFICANT CHANGE UP (ref 3.5–5.3)
PROT SERPL-MCNC: 7.9 G/DL — SIGNIFICANT CHANGE UP (ref 6–8.3)
PROT UR-MCNC: ABNORMAL
PROTHROM AB SERPL-ACNC: 11.4 SEC — SIGNIFICANT CHANGE UP (ref 10–12.9)
RBC # BLD: 3.9 M/UL — SIGNIFICANT CHANGE UP (ref 3.8–5.2)
RBC # FLD: 12.9 % — SIGNIFICANT CHANGE UP (ref 10.3–14.5)
RBC CASTS # UR COMP ASSIST: 6 /HPF — HIGH (ref 0–4)
RH IG SCN BLD-IMP: NEGATIVE — SIGNIFICANT CHANGE UP
RH IG SCN BLD-IMP: NEGATIVE — SIGNIFICANT CHANGE UP
SODIUM SERPL-SCNC: 143 MMOL/L — SIGNIFICANT CHANGE UP (ref 135–145)
SP GR SPEC: 1.05 — HIGH (ref 1.01–1.02)
TROPONIN T, HIGH SENSITIVITY RESULT: 20 NG/L — SIGNIFICANT CHANGE UP (ref 0–51)
UROBILINOGEN FLD QL: NEGATIVE — SIGNIFICANT CHANGE UP
WBC # BLD: 11.8 K/UL — HIGH (ref 3.8–10.5)
WBC # FLD AUTO: 11.8 K/UL — HIGH (ref 3.8–10.5)
WBC UR QL: 1 /HPF — SIGNIFICANT CHANGE UP (ref 0–5)

## 2018-11-17 PROCEDURE — 99285 EMERGENCY DEPT VISIT HI MDM: CPT

## 2018-11-17 PROCEDURE — 71045 X-RAY EXAM CHEST 1 VIEW: CPT | Mod: 26,76

## 2018-11-17 PROCEDURE — 99223 1ST HOSP IP/OBS HIGH 75: CPT | Mod: 57

## 2018-11-17 PROCEDURE — 74177 CT ABD & PELVIS W/CONTRAST: CPT | Mod: 26

## 2018-11-17 RX ORDER — SODIUM CHLORIDE 9 MG/ML
1000 INJECTION INTRAMUSCULAR; INTRAVENOUS; SUBCUTANEOUS ONCE
Qty: 0 | Refills: 0 | Status: COMPLETED | OUTPATIENT
Start: 2018-11-17 | End: 2018-11-17

## 2018-11-17 RX ORDER — PANTOPRAZOLE SODIUM 20 MG/1
40 TABLET, DELAYED RELEASE ORAL DAILY
Qty: 0 | Refills: 0 | Status: DISCONTINUED | OUTPATIENT
Start: 2018-11-17 | End: 2018-11-17

## 2018-11-17 RX ORDER — MORPHINE SULFATE 50 MG/1
4 CAPSULE, EXTENDED RELEASE ORAL ONCE
Qty: 0 | Refills: 0 | Status: DISCONTINUED | OUTPATIENT
Start: 2018-11-17 | End: 2018-11-17

## 2018-11-17 RX ORDER — SODIUM CHLORIDE 9 MG/ML
1000 INJECTION INTRAMUSCULAR; INTRAVENOUS; SUBCUTANEOUS
Qty: 0 | Refills: 0 | Status: DISCONTINUED | OUTPATIENT
Start: 2018-11-17 | End: 2018-11-17

## 2018-11-17 RX ORDER — ONDANSETRON 8 MG/1
4 TABLET, FILM COATED ORAL ONCE
Qty: 0 | Refills: 0 | Status: COMPLETED | OUTPATIENT
Start: 2018-11-17 | End: 2018-11-17

## 2018-11-17 RX ADMIN — SODIUM CHLORIDE 50 MILLILITER(S): 9 INJECTION INTRAMUSCULAR; INTRAVENOUS; SUBCUTANEOUS at 19:57

## 2018-11-17 RX ADMIN — PANTOPRAZOLE SODIUM 40 MILLIGRAM(S): 20 TABLET, DELAYED RELEASE ORAL at 18:18

## 2018-11-17 RX ADMIN — MORPHINE SULFATE 4 MILLIGRAM(S): 50 CAPSULE, EXTENDED RELEASE ORAL at 13:02

## 2018-11-17 RX ADMIN — ONDANSETRON 4 MILLIGRAM(S): 8 TABLET, FILM COATED ORAL at 13:02

## 2018-11-17 RX ADMIN — SODIUM CHLORIDE 4000 MILLILITER(S): 9 INJECTION INTRAMUSCULAR; INTRAVENOUS; SUBCUTANEOUS at 13:02

## 2018-11-17 NOTE — ED ADULT TRIAGE NOTE - CHIEF COMPLAINT QUOTE
Patient sent from [facility] for vomiting and abd pain

## 2018-11-17 NOTE — ED PROVIDER NOTE - PROGRESS NOTE DETAILS
pt had transient hypoxia to 60s, now improved. consider pe, less likely than apiration event or morphine rxn. now better surg at bedside for closed loop bowel obs. to put in ngt ROLANDO Magana: surgery at bedside difficulty placing NG tube. awaiting recommendations surg has placed ngt. 1000 cc out. continues to drain. ROLANDO Magana: Radiology called recommending to advance NG tube. Advanced NG tube further with more output of gastric contents and will get repeat CXR to confirm placement ROLANDO Magana: Afib pacemaker with aortic stenosis. patient noted to in rapid afib to 143. Assessed patient at bedside BP systolic 190s. ED attending Dr. Piña recommended Cardizem 10mg IV. Surgery attending Dr. Simmons aware and agreed with Cardizem 10mg and will reassess. Dr. Simmons stated she may need an additional dose of Cardizem as she has been vomiting over last few days and has not had home Cardizem dose ROLANDO Magana: Afib pacemaker with aortic stenosis. patient noted to in rapid afib to 143. Assessed patient at bedside /99. ED attending Dr. Piña recommended Cardizem 10mg IV. Surgery attending Dr. Simmons aware and agreed with Cardizem 10mg and will reassess. Dr. Simmons stated she may need an additional dose of Cardizem as she has been vomiting over last few days and has not had home Cardizem dose ROLANDO Magana: Patient continues to be in rapid afib 150 with /54. Will get EKG and order additional dose of Cardizem 10mg. ED attending Dr. Piña aware and agreed ROLANDO singh: Made surgery aware pt rapid afib 149bpm with possible ST depressions seen in V3V4 and that pt will be getting second dose Cardizem 10mg IV. Surgery resident will be discussing case with surgical attending Dr. Simmons and will call me back. Will order trop [place]: pt received Cardiazem, with EKG changes, Simmons made aware JPATEL: pt back in sinus rhythm, with normalized ST segments EKG NSR 62bpm

## 2018-11-17 NOTE — ED ADULT NURSE NOTE - CHPI ED NUR SYMPTOMS NEG
no fever/no abdominal distension/no hematuria/no burning urination/no diarrhea/no dysuria/no blood in stool/no chills

## 2018-11-17 NOTE — H&P ADULT - ASSESSMENT
Bita Lopez is a 89 y.o. woman with history of HTN, CAD, pacemaker, Anxiety, spastic bladder, and hysterectomy who presents to the ED with 3 days of nausea and vomiting, found to have a SBO.    Plan:  - NPO + NGT  - OR for ex lap  - Patient needs medicine reconciliation when assisted living, [facility], is open    Dain Lopez, PGY2  [phone]

## 2018-11-17 NOTE — ED PROVIDER NOTE - CONSTITUTIONAL, MLM
normal... Well appearing, well nourished, awake, alert, oriented to person, place, time/situation and in no apparent distress. Frail.

## 2018-11-17 NOTE — H&P ADULT - ATTENDING COMMENTS
seen and examined 11-17-18 @ 1610    abd soft / NT / moderately distended    WBC = 12  BUN = 59  Cr = 1.15  lactate = 1.4    CT abd/pelvis - complete SBO with transition at (axial 53, sagittal 37, coronal 35).      Complete SBO which appears to be secondary to a tight band, possibly with volvulized mesentery, but I don't think it is a closed loop since I do not see another transition point. There is no evidence of strangulated bowel on labs or CT scan. She has been completely obstructed for over 2 days, so ex-lap / TORRES is indicated.  -informed consent was obtained from her nephew by phone    prerenal DANELLE  -a Spangler was placed and urine does not appear concentrated, so I believe she is adequately resuscitated for surgery    afib/RVR since she has not been absorbing diltiazem for 2-3 days  -diltiazem IV to control HR

## 2018-11-17 NOTE — ED ADULT NURSE NOTE - OBJECTIVE STATEMENT
89 yo F EZEKIEL, from the [facility] aaox4 c/o of Abd nausea and vomiting. Pt reports having " a piece of chocolate cake 2days ago" now unable  to tolerate PO. Pt also reports diffuse abd pain. Denies CP dizziness. + weakness. No Fever or chills noted. Denies  distress. PMH HTN, Pacemaker, Anxiety, GERD  . BS sounds + All 4Q Abdomen soft, nontender, nondistended. Last BM today.  Denies Palpitations. IV line placed. EKG completed.

## 2018-11-17 NOTE — H&P ADULT - NSHPLABSRESULTS_GEN_ALL_CORE
ICU Vital Signs Last 24 Hrs  T(C): 36.7 (17 Nov 2018 13:38), Max: 36.7 (17 Nov 2018 13:38)  T(F): 98.1 (17 Nov 2018 13:38), Max: 98.1 (17 Nov 2018 13:38)  HR: 68 (17 Nov 2018 14:39) (68 - 91)  BP: 147/75 (17 Nov 2018 14:39) (147/75 - 203/87)  BP(mean): --  ABP: --  ABP(mean): --  RR: 18 (17 Nov 2018 14:39) (18 - 18)  SpO2: 100% (17 Nov 2018 14:39) (95% - 100%)    CBC (11-17 @ 13:19)                              12.3                           11.8<H>  )----------------(  230        87.7<H>% Neutrophils, 5.2<L>% Lymphocytes, ANC: 10.3<H>                              36.5                  BMP (11-17 @ 13:19)             143     |  94<L>   |  59<H> 		Ca++ --      Ca 10.1               ---------------------------------( 140<H>		Mg --                 3.9     |  33<H>   |  1.15  			Ph --        LFTs (11-17 @ 13:19)      TPro 7.9 / Alb 4.8 / TBili 0.6 / DBili -- / AST 17 / ALT 13 / AlkPhos 61    Coags (11-17 @ 17:32)  aPTT 25.9<L> / INR 1.00 / PT 11.4    ABG (11-17 @ 17:32)      /  /  /  /  / %     Lactate:   1.4    VBG (11-17 @ 17:32)     7.35 / 62<H> / <50 / 33<H> / 6.5<H> / 40<L>%    < from: CT Abdomen and Pelvis w/ IV Cont (11.17.18 @ 16:21) >    LOWER CHEST: Trace bilateral pleural effusions with adjacent bibasilar   atelectasis. Cardiomegaly. Calcifications of the aortic and mitral valve..    LIVER: Multiple hypodense liver lesions which measure slightly higher   than simple fluid.     BILE DUCTS: No intrahepatic ductal dilatation. The distal common bile   duct measures 1.1 cm.  GALLBLADDER: Distended.  SPLEEN: Within normal limits.  PANCREAS: Within normal limits.  ADRENALS: Within normal limits.  KIDNEYS/URETERS: Left superior renal cyst, measuring 3.4 cm, with thin   enhancing internal septations (Bosniak grade 2). Additional bilateral   subcentimeter hypodensities too small to characterize.     BLADDER: Distended.  REPRODUCTIVE ORGANS: Status post hysterectomy. No adnexal masses.    BOWEL: High-grade small bowel obstruction with transition point in the   left lower hemiabdomen (3, 56) with associated mesenteric swirling   concerning for a closed loop obstruction.  No significant interval   mesenteric edema. Small hiatal hernia.  PERITONEUM: No ascites. No pneumoperitoneum.  VESSELS:  Atherosclerotic calcifications.  RETROPERITONEUM: No lymphadenopathy.    ABDOMINAL WALL: Within normal limits.  BONES: Multilevel degenerative changes.    IMPRESSION:   High-grade small bowel obstruction with transition point in the left   lower hemiabdomen with associated mesenteric swirling concerning for a   closed loop obstruction.  No mesenteric edema, ascites or pneumatosis.    < end of copied text >

## 2018-11-17 NOTE — ED PROVIDER NOTE - OBJECTIVE STATEMENT
89 female at assisted living, 2 days diffuse abdominal pain / n/v - cannot hold anything down. no cough, no cp or sob

## 2018-11-17 NOTE — H&P ADULT - NSHPPHYSICALEXAM_GEN_ALL_CORE
General: Lime, awake, alert, answering questions appropriately  HEENT: NGT placed with 700 mL of gastric contents  CV: hypertensive, regular rate  Pulm: nonlabored, SpO2 90's on 2L NC  Abd: soft, mildly distended, nontender

## 2018-11-17 NOTE — CHART NOTE - NSCHARTNOTEFT_GEN_A_CORE
I discussed the surgical procedure with the patient's HCP (nephew). He expressed that his aunt would not like treatments that would permanently change her quality of life.  We decided that ileostomy would therefore not be within her goals of care, even if felt to be the safest decision based on intraoperative findings.  We decided to rescind the DNR while under anesthesia as outcomes after resuscitation during anesthesia are much better than they are for patients who suffer cardiac arrest while not under anesthesia.  The plan was discussed with the anesthesia team.

## 2018-11-18 LAB
ANION GAP SERPL CALC-SCNC: 14 MMOL/L — SIGNIFICANT CHANGE UP (ref 5–17)
ANION GAP SERPL CALC-SCNC: 17 MMOL/L — SIGNIFICANT CHANGE UP (ref 5–17)
BUN SERPL-MCNC: 38 MG/DL — HIGH (ref 7–23)
BUN SERPL-MCNC: 40 MG/DL — HIGH (ref 7–23)
CALCIUM SERPL-MCNC: 7.3 MG/DL — LOW (ref 8.4–10.5)
CALCIUM SERPL-MCNC: 7.4 MG/DL — LOW (ref 8.4–10.5)
CHLORIDE SERPL-SCNC: 102 MMOL/L — SIGNIFICANT CHANGE UP (ref 96–108)
CHLORIDE SERPL-SCNC: 105 MMOL/L — SIGNIFICANT CHANGE UP (ref 96–108)
CO2 SERPL-SCNC: 26 MMOL/L — SIGNIFICANT CHANGE UP (ref 22–31)
CO2 SERPL-SCNC: 26 MMOL/L — SIGNIFICANT CHANGE UP (ref 22–31)
CREAT SERPL-MCNC: 0.75 MG/DL — SIGNIFICANT CHANGE UP (ref 0.5–1.3)
CREAT SERPL-MCNC: 0.77 MG/DL — SIGNIFICANT CHANGE UP (ref 0.5–1.3)
GLUCOSE SERPL-MCNC: 103 MG/DL — HIGH (ref 70–99)
GLUCOSE SERPL-MCNC: 96 MG/DL — SIGNIFICANT CHANGE UP (ref 70–99)
HCT VFR BLD CALC: 30.2 % — LOW (ref 34.5–45)
HGB BLD-MCNC: 9.9 G/DL — LOW (ref 11.5–15.5)
MAGNESIUM SERPL-MCNC: 2 MG/DL — SIGNIFICANT CHANGE UP (ref 1.6–2.6)
MCHC RBC-ENTMCNC: 31.1 PG — SIGNIFICANT CHANGE UP (ref 27–34)
MCHC RBC-ENTMCNC: 32.8 GM/DL — SIGNIFICANT CHANGE UP (ref 32–36)
MCV RBC AUTO: 95.1 FL — SIGNIFICANT CHANGE UP (ref 80–100)
PHOSPHATE SERPL-MCNC: 2.9 MG/DL — SIGNIFICANT CHANGE UP (ref 2.5–4.5)
PLATELET # BLD AUTO: 160 K/UL — SIGNIFICANT CHANGE UP (ref 150–400)
POTASSIUM SERPL-MCNC: 2.8 MMOL/L — CRITICAL LOW (ref 3.5–5.3)
POTASSIUM SERPL-MCNC: 3.3 MMOL/L — LOW (ref 3.5–5.3)
POTASSIUM SERPL-SCNC: 2.8 MMOL/L — CRITICAL LOW (ref 3.5–5.3)
POTASSIUM SERPL-SCNC: 3.3 MMOL/L — LOW (ref 3.5–5.3)
RBC # BLD: 3.17 M/UL — LOW (ref 3.8–5.2)
RBC # FLD: 12.5 % — SIGNIFICANT CHANGE UP (ref 10.3–14.5)
SODIUM SERPL-SCNC: 145 MMOL/L — SIGNIFICANT CHANGE UP (ref 135–145)
SODIUM SERPL-SCNC: 145 MMOL/L — SIGNIFICANT CHANGE UP (ref 135–145)
WBC # BLD: 3.2 K/UL — LOW (ref 3.8–10.5)
WBC # FLD AUTO: 3.2 K/UL — LOW (ref 3.8–10.5)

## 2018-11-18 PROCEDURE — 44005 FREEING OF BOWEL ADHESION: CPT | Mod: 22,GC

## 2018-11-18 RX ORDER — ACETAMINOPHEN 500 MG
750 TABLET ORAL ONCE
Qty: 0 | Refills: 0 | Status: COMPLETED | OUTPATIENT
Start: 2018-11-18 | End: 2018-11-18

## 2018-11-18 RX ORDER — PANTOPRAZOLE SODIUM 20 MG/1
40 TABLET, DELAYED RELEASE ORAL DAILY
Qty: 0 | Refills: 0 | Status: DISCONTINUED | OUTPATIENT
Start: 2018-11-18 | End: 2018-11-26

## 2018-11-18 RX ORDER — SODIUM CHLORIDE 9 MG/ML
1000 INJECTION, SOLUTION INTRAVENOUS
Qty: 0 | Refills: 0 | Status: DISCONTINUED | OUTPATIENT
Start: 2018-11-18 | End: 2018-11-19

## 2018-11-18 RX ORDER — MORPHINE SULFATE 50 MG/1
1 CAPSULE, EXTENDED RELEASE ORAL ONCE
Qty: 0 | Refills: 0 | Status: DISCONTINUED | OUTPATIENT
Start: 2018-11-18 | End: 2018-11-18

## 2018-11-18 RX ORDER — POTASSIUM CHLORIDE 20 MEQ
10 PACKET (EA) ORAL
Qty: 0 | Refills: 0 | Status: COMPLETED | OUTPATIENT
Start: 2018-11-18 | End: 2018-11-18

## 2018-11-18 RX ORDER — ACETAMINOPHEN 500 MG
750 TABLET ORAL ONCE
Qty: 0 | Refills: 0 | Status: COMPLETED | OUTPATIENT
Start: 2018-11-18 | End: 2018-11-19

## 2018-11-18 RX ORDER — HEPARIN SODIUM 5000 [USP'U]/ML
5000 INJECTION INTRAVENOUS; SUBCUTANEOUS EVERY 8 HOURS
Qty: 0 | Refills: 0 | Status: DISCONTINUED | OUTPATIENT
Start: 2018-11-18 | End: 2018-11-26

## 2018-11-18 RX ORDER — SODIUM CHLORIDE 9 MG/ML
500 INJECTION INTRAMUSCULAR; INTRAVENOUS; SUBCUTANEOUS ONCE
Qty: 0 | Refills: 0 | Status: COMPLETED | OUTPATIENT
Start: 2018-11-18 | End: 2018-11-18

## 2018-11-18 RX ORDER — ACETAMINOPHEN 500 MG
700 TABLET ORAL ONCE
Qty: 0 | Refills: 0 | Status: COMPLETED | OUTPATIENT
Start: 2018-11-18 | End: 2018-11-18

## 2018-11-18 RX ORDER — ONDANSETRON 8 MG/1
4 TABLET, FILM COATED ORAL ONCE
Qty: 0 | Refills: 0 | Status: DISCONTINUED | OUTPATIENT
Start: 2018-11-18 | End: 2018-11-18

## 2018-11-18 RX ADMIN — Medication 300 MILLIGRAM(S): at 13:46

## 2018-11-18 RX ADMIN — MORPHINE SULFATE 1 MILLIGRAM(S): 50 CAPSULE, EXTENDED RELEASE ORAL at 08:33

## 2018-11-18 RX ADMIN — Medication 750 MILLIGRAM(S): at 03:36

## 2018-11-18 RX ADMIN — HEPARIN SODIUM 5000 UNIT(S): 5000 INJECTION INTRAVENOUS; SUBCUTANEOUS at 05:21

## 2018-11-18 RX ADMIN — SODIUM CHLORIDE 500 MILLILITER(S): 9 INJECTION INTRAMUSCULAR; INTRAVENOUS; SUBCUTANEOUS at 08:15

## 2018-11-18 RX ADMIN — Medication 100 MILLIEQUIVALENT(S): at 12:50

## 2018-11-18 RX ADMIN — Medication 100 MILLIEQUIVALENT(S): at 05:21

## 2018-11-18 RX ADMIN — Medication 300 MILLIGRAM(S): at 22:50

## 2018-11-18 RX ADMIN — HEPARIN SODIUM 5000 UNIT(S): 5000 INJECTION INTRAVENOUS; SUBCUTANEOUS at 22:51

## 2018-11-18 RX ADMIN — Medication 750 MILLIGRAM(S): at 14:16

## 2018-11-18 RX ADMIN — Medication 100 MILLIEQUIVALENT(S): at 13:47

## 2018-11-18 RX ADMIN — PANTOPRAZOLE SODIUM 40 MILLIGRAM(S): 20 TABLET, DELAYED RELEASE ORAL at 11:52

## 2018-11-18 RX ADMIN — MORPHINE SULFATE 1 MILLIGRAM(S): 50 CAPSULE, EXTENDED RELEASE ORAL at 12:22

## 2018-11-18 RX ADMIN — Medication 100 MILLIEQUIVALENT(S): at 11:52

## 2018-11-18 RX ADMIN — SODIUM CHLORIDE 50 MILLILITER(S): 9 INJECTION, SOLUTION INTRAVENOUS at 05:20

## 2018-11-18 RX ADMIN — Medication 100 MILLIEQUIVALENT(S): at 06:12

## 2018-11-18 RX ADMIN — MORPHINE SULFATE 1 MILLIGRAM(S): 50 CAPSULE, EXTENDED RELEASE ORAL at 08:18

## 2018-11-18 RX ADMIN — Medication 750 MILLIGRAM(S): at 23:10

## 2018-11-18 RX ADMIN — Medication 700 MILLIGRAM(S): at 08:49

## 2018-11-18 RX ADMIN — Medication 100 MILLIEQUIVALENT(S): at 07:15

## 2018-11-18 RX ADMIN — HEPARIN SODIUM 5000 UNIT(S): 5000 INJECTION INTRAVENOUS; SUBCUTANEOUS at 13:46

## 2018-11-18 RX ADMIN — SODIUM CHLORIDE 50 MILLILITER(S): 9 INJECTION, SOLUTION INTRAVENOUS at 03:20

## 2018-11-18 RX ADMIN — Medication 300 MILLIGRAM(S): at 03:20

## 2018-11-18 RX ADMIN — MORPHINE SULFATE 1 MILLIGRAM(S): 50 CAPSULE, EXTENDED RELEASE ORAL at 11:52

## 2018-11-18 RX ADMIN — Medication 280 MILLIGRAM(S): at 08:18

## 2018-11-18 NOTE — BRIEF OPERATIVE NOTE - OPERATION/FINDINGS
Exploratory laparotomy, dense adhesions of ileum to pelvis, lysed carefully with cautery. Internal hernia reduced. Dilated proximal small bowel milked back to the stomach, approx 700 cc feculent output drained via NGT.

## 2018-11-18 NOTE — BRIEF OPERATIVE NOTE - PROCEDURE
<<-----Click on this checkbox to enter Procedure Lysis of adhesions  11/18/2018    Active  CCEN12  Exploratory laparotomy  11/18/2018    Active  CCEN12

## 2018-11-18 NOTE — PROGRESS NOTE ADULT - ASSESSMENT
Bita Lopez is a 89 y.o. woman with history of HTN, CAD, pacemaker, Anxiety, spastic bladder, and hysterectomy now s/p exlap and TORRES 11/18    Plan:  - NPO/IVF/NGT  - Monitor abd pain  - OOB and ambulate as tolerated  - Pain control prn  - PT consult    ATP  [id number]

## 2018-11-19 LAB
ANION GAP SERPL CALC-SCNC: 18 MMOL/L — HIGH (ref 5–17)
BUN SERPL-MCNC: 37 MG/DL — HIGH (ref 7–23)
CALCIUM SERPL-MCNC: 8.4 MG/DL — SIGNIFICANT CHANGE UP (ref 8.4–10.5)
CHLORIDE SERPL-SCNC: 102 MMOL/L — SIGNIFICANT CHANGE UP (ref 96–108)
CO2 SERPL-SCNC: 24 MMOL/L — SIGNIFICANT CHANGE UP (ref 22–31)
CREAT SERPL-MCNC: 0.82 MG/DL — SIGNIFICANT CHANGE UP (ref 0.5–1.3)
GLUCOSE SERPL-MCNC: 66 MG/DL — LOW (ref 70–99)
HCT VFR BLD CALC: 33.4 % — LOW (ref 34.5–45)
HGB BLD-MCNC: 11.1 G/DL — LOW (ref 11.5–15.5)
MAGNESIUM SERPL-MCNC: 2.3 MG/DL — SIGNIFICANT CHANGE UP (ref 1.6–2.6)
MCHC RBC-ENTMCNC: 32.2 PG — SIGNIFICANT CHANGE UP (ref 27–34)
MCHC RBC-ENTMCNC: 33.1 GM/DL — SIGNIFICANT CHANGE UP (ref 32–36)
MCV RBC AUTO: 97.2 FL — SIGNIFICANT CHANGE UP (ref 80–100)
PHOSPHATE SERPL-MCNC: 3.6 MG/DL — SIGNIFICANT CHANGE UP (ref 2.5–4.5)
PLATELET # BLD AUTO: 194 K/UL — SIGNIFICANT CHANGE UP (ref 150–400)
POTASSIUM SERPL-MCNC: 4.4 MMOL/L — SIGNIFICANT CHANGE UP (ref 3.5–5.3)
POTASSIUM SERPL-SCNC: 4.4 MMOL/L — SIGNIFICANT CHANGE UP (ref 3.5–5.3)
RBC # BLD: 3.44 M/UL — LOW (ref 3.8–5.2)
RBC # FLD: 12.6 % — SIGNIFICANT CHANGE UP (ref 10.3–14.5)
SODIUM SERPL-SCNC: 144 MMOL/L — SIGNIFICANT CHANGE UP (ref 135–145)
WBC # BLD: 8 K/UL — SIGNIFICANT CHANGE UP (ref 3.8–10.5)
WBC # FLD AUTO: 8 K/UL — SIGNIFICANT CHANGE UP (ref 3.8–10.5)

## 2018-11-19 PROCEDURE — 99024 POSTOP FOLLOW-UP VISIT: CPT

## 2018-11-19 PROCEDURE — 74018 RADEX ABDOMEN 1 VIEW: CPT | Mod: 26

## 2018-11-19 PROCEDURE — 93010 ELECTROCARDIOGRAM REPORT: CPT

## 2018-11-19 RX ORDER — SODIUM CHLORIDE 9 MG/ML
1000 INJECTION, SOLUTION INTRAVENOUS
Qty: 0 | Refills: 0 | Status: DISCONTINUED | OUTPATIENT
Start: 2018-11-19 | End: 2018-11-21

## 2018-11-19 RX ADMIN — SODIUM CHLORIDE 50 MILLILITER(S): 9 INJECTION, SOLUTION INTRAVENOUS at 17:30

## 2018-11-19 RX ADMIN — Medication 300 MILLIGRAM(S): at 05:58

## 2018-11-19 RX ADMIN — HEPARIN SODIUM 5000 UNIT(S): 5000 INJECTION INTRAVENOUS; SUBCUTANEOUS at 05:58

## 2018-11-19 RX ADMIN — Medication 750 MILLIGRAM(S): at 06:20

## 2018-11-19 RX ADMIN — PANTOPRAZOLE SODIUM 40 MILLIGRAM(S): 20 TABLET, DELAYED RELEASE ORAL at 13:27

## 2018-11-19 RX ADMIN — HEPARIN SODIUM 5000 UNIT(S): 5000 INJECTION INTRAVENOUS; SUBCUTANEOUS at 13:20

## 2018-11-19 RX ADMIN — HEPARIN SODIUM 5000 UNIT(S): 5000 INJECTION INTRAVENOUS; SUBCUTANEOUS at 23:03

## 2018-11-19 RX ADMIN — SODIUM CHLORIDE 50 MILLILITER(S): 9 INJECTION, SOLUTION INTRAVENOUS at 23:03

## 2018-11-19 NOTE — PHYSICAL THERAPY INITIAL EVALUATION ADULT - ORIENTATION, REHAB EVAL
pt reported "[facility]" to location, and got month & year correct, incorrect date/person/place/time

## 2018-11-19 NOTE — PHYSICAL THERAPY INITIAL EVALUATION ADULT - PERTINENT HX OF CURRENT PROBLEM, REHAB EVAL
89 y/o female with h/o CAD, pacemaker, anxiety, spastic bladder, and hysterectomy who presents with c/o nausea and vomiting. Pt found to have SBO. Pt now presents s/p ex-lap and lysis of adhesions. CT abd/pelvis: high-grade small bowel obstruction with transition point in the L lower hemiabdomen with associated mesenteric swirling concerning for a closed loop obstruction. No mesenteric edema, ascites or pneumatosis.

## 2018-11-19 NOTE — PHYSICAL THERAPY INITIAL EVALUATION ADULT - ADDITIONAL COMMENTS
PT unable to reach Nephew Alexey to confirm social history. As per pt, pt states she lives in the [place] and was independent with functional mobility and ADLs. Pt states she was ambulatory with a RW. Pt uncertain historian, as pt reported to  requiring assistance with most ADLs.

## 2018-11-19 NOTE — CHART NOTE - NSCHARTNOTEFT_GEN_A_CORE
Called by RN at 4:50 AM that patient pulled out NGT. Two providers attempted to replace NGT, unsuccessful. Sign out provided to daytime team that placement was unsuccessful.   Breana Neumann PA-C   [id number]

## 2018-11-19 NOTE — CHART NOTE - NSCHARTNOTEFT_GEN_A_CORE
Patient is a 89y old  Female who presents with a chief complaint of Closed-loop SBO (18 Nov 2018 14:05).    Interval History: Notified by RN at 00:26 that patient had an episode of atrial fibrillation earlier in the night. Call placed to telemetry tech. Ectopy occurred at 22:20. Rhythm detected as PAT. 4.8 seconds up to 190s. Patient seen and evaluated at the bedside. Denies chest pain, palpitations, shortness of breath, coughing, dizziness, nausea, and vomiting. Endorses pain in the abdomen.       Vital Signs Last 24 Hrs  T(C): 37.1 (19 Nov 2018 01:19), Max: 37.1 (18 Nov 2018 20:51)  T(F): 98.7 (19 Nov 2018 01:19), Max: 98.7 (18 Nov 2018 20:51)  HR: 75 (19 Nov 2018 01:19) (62 - 89)  BP: 121/75 (19 Nov 2018 01:19) (88/46 - 147/67)  BP(mean): 70 (18 Nov 2018 08:00) (64 - 97)  RR: 17 (19 Nov 2018 01:19) (14 - 19)  SpO2: 98% (19 Nov 2018 01:19) (95% - 100%)                        9.9    3.2   )-----------( 160      ( 18 Nov 2018 03:55 )             30.2     11-18    145  |  105  |  38<H>  ----------------------------<  96  3.3<L>   |  26  |  0.77    Ca    7.3<L>      18 Nov 2018 09:21  Phos  2.9     11-18  Mg     2.0     11-18    TPro  7.9  /  Alb  4.8  /  TBili  0.6  /  DBili  x   /  AST  17  /  ALT  13  /  AlkPhos  61  11-17    PT/INR - ( 17 Nov 2018 17:32 )   PT: 11.4 sec;   INR: 1.00 ratio    PTT - ( 17 Nov 2018 17:32 )  PTT:25.9 sec      Physical Exam:  General: WN/WD NAD, alert and oriented   Respiratory: nonlabored respirations   Abdominal: Soft, NT, ND, dressing intact   MSK: No edema    A/P  HPI:  Bita Lopez is a 89 y.o. woman with history of HTN, CAD, pacemaker, Anxiety, spastic bladder, and hysterectomy who presents to the ED with 3 days of nausea and vomiting. The patient denies current nausea and states that her last episode of vomiting was this morning. The patient states that her last bowel movement was yesterday and she is unsure when she last passed flatus. The patient denies any associated abdominal pain, fever, or chills. (17 Nov 2018 17:49) Patient seen at the bedside for episode of PAT.    - EKG: NSR rate 72   - c/w telemetry monitoring/ pulse oximetry   - consider cardiology consult if ectopy continues   - will discuss with primary team in the morning      ROLANDO Gonzales-INGRID   ATP   [id number] Patient is a 89y old  Female who presents with a chief complaint of Closed-loop SBO (18 Nov 2018 14:05).    Interval History: Notified by RN at 00:26 that patient had an episode of atrial fibrillation earlier in the night. Call placed to telemetry tech. Ectopy occurred at 22:20. Rhythm detected as PAT. 4.8 seconds up to 190s. Patient seen and evaluated at the bedside. Denies chest pain, palpitations, shortness of breath, coughing, dizziness, nausea, and vomiting. Endorses pain in the abdomen.       Vital Signs Last 24 Hrs  T(C): 37.1 (19 Nov 2018 01:19), Max: 37.1 (18 Nov 2018 20:51)  T(F): 98.7 (19 Nov 2018 01:19), Max: 98.7 (18 Nov 2018 20:51)  HR: 75 (19 Nov 2018 01:19) (62 - 89)  BP: 121/75 (19 Nov 2018 01:19) (88/46 - 147/67)  BP(mean): 70 (18 Nov 2018 08:00) (64 - 97)  RR: 17 (19 Nov 2018 01:19) (14 - 19)  SpO2: 98% (19 Nov 2018 01:19) (95% - 100%)                        9.9    3.2   )-----------( 160      ( 18 Nov 2018 03:55 )             30.2     11-18    145  |  105  |  38<H>  ----------------------------<  96  3.3<L>   |  26  |  0.77    Ca    7.3<L>      18 Nov 2018 09:21  Phos  2.9     11-18  Mg     2.0     11-18    TPro  7.9  /  Alb  4.8  /  TBili  0.6  /  DBili  x   /  AST  17  /  ALT  13  /  AlkPhos  61  11-17    PT/INR - ( 17 Nov 2018 17:32 )   PT: 11.4 sec;   INR: 1.00 ratio    PTT - ( 17 Nov 2018 17:32 )  PTT:25.9 sec      Physical Exam:  General: WN/WD NAD, alert and oriented   Respiratory: nonlabored respirations   Abdominal: Soft, NT, ND, dressing intact   MSK: No edema    A/P  HPI:  Bita Lopez is a 89 y.o. woman with history of HTN, CAD, pacemaker, Anxiety, spastic bladder, and hysterectomy who presents to the ED with 3 days of nausea and vomiting. The patient denies current nausea and states that her last episode of vomiting was this morning. The patient states that her last bowel movement was yesterday and she is unsure when she last passed flatus. The patient denies any associated abdominal pain, fever, or chills. (17 Nov 2018 17:49) Patient seen at the bedside for episode of PAT.    - EKG: NSR rate 72   - c/w telemetry monitoring/ pulse oximetry   - consider cardiology consult if ectopy continues   - Dr. Breen aware   - will discuss with primary team in the morning      Breana Neumann PA-C   ATP   [id number]

## 2018-11-19 NOTE — PHYSICAL THERAPY INITIAL EVALUATION ADULT - GENERAL OBSERVATIONS, REHAB EVAL
Pt bradley 45 min eval well. Pt rec'd in chair, peripheral IV line, and NAD. Pt reported some abdominal discomfort, but agreed to ambulate with PT.

## 2018-11-20 LAB
ANION GAP SERPL CALC-SCNC: 13 MMOL/L — SIGNIFICANT CHANGE UP (ref 5–17)
BUN SERPL-MCNC: 27 MG/DL — HIGH (ref 7–23)
CALCIUM SERPL-MCNC: 8.5 MG/DL — SIGNIFICANT CHANGE UP (ref 8.4–10.5)
CHLORIDE SERPL-SCNC: 106 MMOL/L — SIGNIFICANT CHANGE UP (ref 96–108)
CO2 SERPL-SCNC: 27 MMOL/L — SIGNIFICANT CHANGE UP (ref 22–31)
CREAT SERPL-MCNC: 0.75 MG/DL — SIGNIFICANT CHANGE UP (ref 0.5–1.3)
GLUCOSE SERPL-MCNC: 117 MG/DL — HIGH (ref 70–99)
HCT VFR BLD CALC: 29.9 % — LOW (ref 34.5–45)
HGB BLD-MCNC: 10.1 G/DL — LOW (ref 11.5–15.5)
MAGNESIUM SERPL-MCNC: 2.3 MG/DL — SIGNIFICANT CHANGE UP (ref 1.6–2.6)
MCHC RBC-ENTMCNC: 32.7 PG — SIGNIFICANT CHANGE UP (ref 27–34)
MCHC RBC-ENTMCNC: 33.7 GM/DL — SIGNIFICANT CHANGE UP (ref 32–36)
MCV RBC AUTO: 96.8 FL — SIGNIFICANT CHANGE UP (ref 80–100)
PHOSPHATE SERPL-MCNC: 1.6 MG/DL — LOW (ref 2.5–4.5)
PLATELET # BLD AUTO: 178 K/UL — SIGNIFICANT CHANGE UP (ref 150–400)
POTASSIUM SERPL-MCNC: 3.3 MMOL/L — LOW (ref 3.5–5.3)
POTASSIUM SERPL-SCNC: 3.3 MMOL/L — LOW (ref 3.5–5.3)
RBC # BLD: 3.09 M/UL — LOW (ref 3.8–5.2)
RBC # FLD: 12.5 % — SIGNIFICANT CHANGE UP (ref 10.3–14.5)
SODIUM SERPL-SCNC: 146 MMOL/L — HIGH (ref 135–145)
WBC # BLD: 6.3 K/UL — SIGNIFICANT CHANGE UP (ref 3.8–10.5)
WBC # FLD AUTO: 6.3 K/UL — SIGNIFICANT CHANGE UP (ref 3.8–10.5)

## 2018-11-20 PROCEDURE — 99024 POSTOP FOLLOW-UP VISIT: CPT

## 2018-11-20 RX ORDER — POTASSIUM PHOSPHATE, MONOBASIC POTASSIUM PHOSPHATE, DIBASIC 236; 224 MG/ML; MG/ML
15 INJECTION, SOLUTION INTRAVENOUS ONCE
Qty: 0 | Refills: 0 | Status: COMPLETED | OUTPATIENT
Start: 2018-11-20 | End: 2018-11-20

## 2018-11-20 RX ADMIN — SODIUM CHLORIDE 50 MILLILITER(S): 9 INJECTION, SOLUTION INTRAVENOUS at 16:36

## 2018-11-20 RX ADMIN — PANTOPRAZOLE SODIUM 40 MILLIGRAM(S): 20 TABLET, DELAYED RELEASE ORAL at 11:31

## 2018-11-20 RX ADMIN — HEPARIN SODIUM 5000 UNIT(S): 5000 INJECTION INTRAVENOUS; SUBCUTANEOUS at 13:43

## 2018-11-20 RX ADMIN — HEPARIN SODIUM 5000 UNIT(S): 5000 INJECTION INTRAVENOUS; SUBCUTANEOUS at 21:19

## 2018-11-20 RX ADMIN — POTASSIUM PHOSPHATE, MONOBASIC POTASSIUM PHOSPHATE, DIBASIC 62.5 MILLIMOLE(S): 236; 224 INJECTION, SOLUTION INTRAVENOUS at 11:31

## 2018-11-20 RX ADMIN — HEPARIN SODIUM 5000 UNIT(S): 5000 INJECTION INTRAVENOUS; SUBCUTANEOUS at 06:36

## 2018-11-21 LAB
ANION GAP SERPL CALC-SCNC: 12 MMOL/L — SIGNIFICANT CHANGE UP (ref 5–17)
ANION GAP SERPL CALC-SCNC: 12 MMOL/L — SIGNIFICANT CHANGE UP (ref 5–17)
BUN SERPL-MCNC: 12 MG/DL — SIGNIFICANT CHANGE UP (ref 7–23)
BUN SERPL-MCNC: 15 MG/DL — SIGNIFICANT CHANGE UP (ref 7–23)
CALCIUM SERPL-MCNC: 8.5 MG/DL — SIGNIFICANT CHANGE UP (ref 8.4–10.5)
CALCIUM SERPL-MCNC: 8.7 MG/DL — SIGNIFICANT CHANGE UP (ref 8.4–10.5)
CHLORIDE SERPL-SCNC: 100 MMOL/L — SIGNIFICANT CHANGE UP (ref 96–108)
CHLORIDE SERPL-SCNC: 98 MMOL/L — SIGNIFICANT CHANGE UP (ref 96–108)
CK MB CFR SERPL CALC: 1.5 NG/ML — SIGNIFICANT CHANGE UP (ref 0–3.8)
CK SERPL-CCNC: 87 U/L — SIGNIFICANT CHANGE UP (ref 25–170)
CO2 SERPL-SCNC: 29 MMOL/L — SIGNIFICANT CHANGE UP (ref 22–31)
CO2 SERPL-SCNC: 30 MMOL/L — SIGNIFICANT CHANGE UP (ref 22–31)
CREAT SERPL-MCNC: 0.61 MG/DL — SIGNIFICANT CHANGE UP (ref 0.5–1.3)
CREAT SERPL-MCNC: 0.62 MG/DL — SIGNIFICANT CHANGE UP (ref 0.5–1.3)
GLUCOSE SERPL-MCNC: 123 MG/DL — HIGH (ref 70–99)
GLUCOSE SERPL-MCNC: 151 MG/DL — HIGH (ref 70–99)
HCT VFR BLD CALC: 30.4 % — LOW (ref 34.5–45)
HCT VFR BLD CALC: 31 % — LOW (ref 34.5–45)
HGB BLD-MCNC: 10.1 G/DL — LOW (ref 11.5–15.5)
HGB BLD-MCNC: 10.6 G/DL — LOW (ref 11.5–15.5)
MAGNESIUM SERPL-MCNC: 1.8 MG/DL — SIGNIFICANT CHANGE UP (ref 1.6–2.6)
MCHC RBC-ENTMCNC: 31.8 PG — SIGNIFICANT CHANGE UP (ref 27–34)
MCHC RBC-ENTMCNC: 32.3 PG — SIGNIFICANT CHANGE UP (ref 27–34)
MCHC RBC-ENTMCNC: 33.3 GM/DL — SIGNIFICANT CHANGE UP (ref 32–36)
MCHC RBC-ENTMCNC: 34.2 GM/DL — SIGNIFICANT CHANGE UP (ref 32–36)
MCV RBC AUTO: 94.4 FL — SIGNIFICANT CHANGE UP (ref 80–100)
MCV RBC AUTO: 95.4 FL — SIGNIFICANT CHANGE UP (ref 80–100)
PHOSPHATE SERPL-MCNC: 1.5 MG/DL — LOW (ref 2.5–4.5)
PHOSPHATE SERPL-MCNC: 1.8 MG/DL — LOW (ref 2.5–4.5)
PLATELET # BLD AUTO: 176 K/UL — SIGNIFICANT CHANGE UP (ref 150–400)
PLATELET # BLD AUTO: 190 K/UL — SIGNIFICANT CHANGE UP (ref 150–400)
POTASSIUM SERPL-MCNC: 3 MMOL/L — LOW (ref 3.5–5.3)
POTASSIUM SERPL-MCNC: 3.4 MMOL/L — LOW (ref 3.5–5.3)
POTASSIUM SERPL-SCNC: 3 MMOL/L — LOW (ref 3.5–5.3)
POTASSIUM SERPL-SCNC: 3.4 MMOL/L — LOW (ref 3.5–5.3)
RBC # BLD: 3.19 M/UL — LOW (ref 3.8–5.2)
RBC # BLD: 3.28 M/UL — LOW (ref 3.8–5.2)
RBC # FLD: 12.2 % — SIGNIFICANT CHANGE UP (ref 10.3–14.5)
RBC # FLD: 12.3 % — SIGNIFICANT CHANGE UP (ref 10.3–14.5)
SODIUM SERPL-SCNC: 139 MMOL/L — SIGNIFICANT CHANGE UP (ref 135–145)
SODIUM SERPL-SCNC: 142 MMOL/L — SIGNIFICANT CHANGE UP (ref 135–145)
TROPONIN T, HIGH SENSITIVITY RESULT: 17 NG/L — SIGNIFICANT CHANGE UP (ref 0–51)
TROPONIN T, HIGH SENSITIVITY RESULT: 18 NG/L — SIGNIFICANT CHANGE UP (ref 0–51)
WBC # BLD: 7.6 K/UL — SIGNIFICANT CHANGE UP (ref 3.8–10.5)
WBC # BLD: 9.3 K/UL — SIGNIFICANT CHANGE UP (ref 3.8–10.5)
WBC # FLD AUTO: 7.6 K/UL — SIGNIFICANT CHANGE UP (ref 3.8–10.5)
WBC # FLD AUTO: 9.3 K/UL — SIGNIFICANT CHANGE UP (ref 3.8–10.5)

## 2018-11-21 PROCEDURE — 99024 POSTOP FOLLOW-UP VISIT: CPT

## 2018-11-21 PROCEDURE — 93010 ELECTROCARDIOGRAM REPORT: CPT

## 2018-11-21 PROCEDURE — 71045 X-RAY EXAM CHEST 1 VIEW: CPT | Mod: 26

## 2018-11-21 RX ORDER — POTASSIUM PHOSPHATE, MONOBASIC POTASSIUM PHOSPHATE, DIBASIC 236; 224 MG/ML; MG/ML
15 INJECTION, SOLUTION INTRAVENOUS ONCE
Qty: 0 | Refills: 0 | Status: COMPLETED | OUTPATIENT
Start: 2018-11-21 | End: 2018-11-21

## 2018-11-21 RX ORDER — POTASSIUM CHLORIDE 20 MEQ
10 PACKET (EA) ORAL
Qty: 0 | Refills: 0 | Status: COMPLETED | OUTPATIENT
Start: 2018-11-21 | End: 2018-11-21

## 2018-11-21 RX ORDER — POTASSIUM PHOSPHATE, MONOBASIC POTASSIUM PHOSPHATE, DIBASIC 236; 224 MG/ML; MG/ML
30 INJECTION, SOLUTION INTRAVENOUS ONCE
Qty: 0 | Refills: 0 | Status: COMPLETED | OUTPATIENT
Start: 2018-11-21 | End: 2018-11-21

## 2018-11-21 RX ORDER — MAGNESIUM SULFATE 500 MG/ML
2 VIAL (ML) INJECTION ONCE
Qty: 0 | Refills: 0 | Status: COMPLETED | OUTPATIENT
Start: 2018-11-21 | End: 2018-11-21

## 2018-11-21 RX ORDER — DEXTROSE MONOHYDRATE, SODIUM CHLORIDE, AND POTASSIUM CHLORIDE 50; .745; 4.5 G/1000ML; G/1000ML; G/1000ML
1000 INJECTION, SOLUTION INTRAVENOUS
Qty: 0 | Refills: 0 | Status: DISCONTINUED | OUTPATIENT
Start: 2018-11-21 | End: 2018-11-24

## 2018-11-21 RX ADMIN — SODIUM CHLORIDE 50 MILLILITER(S): 9 INJECTION, SOLUTION INTRAVENOUS at 09:26

## 2018-11-21 RX ADMIN — HEPARIN SODIUM 5000 UNIT(S): 5000 INJECTION INTRAVENOUS; SUBCUTANEOUS at 21:44

## 2018-11-21 RX ADMIN — PANTOPRAZOLE SODIUM 40 MILLIGRAM(S): 20 TABLET, DELAYED RELEASE ORAL at 11:41

## 2018-11-21 RX ADMIN — POTASSIUM PHOSPHATE, MONOBASIC POTASSIUM PHOSPHATE, DIBASIC 62.5 MILLIMOLE(S): 236; 224 INJECTION, SOLUTION INTRAVENOUS at 14:02

## 2018-11-21 RX ADMIN — HEPARIN SODIUM 5000 UNIT(S): 5000 INJECTION INTRAVENOUS; SUBCUTANEOUS at 12:59

## 2018-11-21 RX ADMIN — Medication 100 MILLIEQUIVALENT(S): at 12:56

## 2018-11-21 RX ADMIN — Medication 100 MILLIEQUIVALENT(S): at 11:39

## 2018-11-21 RX ADMIN — DEXTROSE MONOHYDRATE, SODIUM CHLORIDE, AND POTASSIUM CHLORIDE 50 MILLILITER(S): 50; .745; 4.5 INJECTION, SOLUTION INTRAVENOUS at 15:44

## 2018-11-21 RX ADMIN — HEPARIN SODIUM 5000 UNIT(S): 5000 INJECTION INTRAVENOUS; SUBCUTANEOUS at 05:25

## 2018-11-21 RX ADMIN — POTASSIUM PHOSPHATE, MONOBASIC POTASSIUM PHOSPHATE, DIBASIC 83.33 MILLIMOLE(S): 236; 224 INJECTION, SOLUTION INTRAVENOUS at 18:16

## 2018-11-21 RX ADMIN — Medication 100 MILLIEQUIVALENT(S): at 10:37

## 2018-11-21 RX ADMIN — Medication 50 GRAM(S): at 09:26

## 2018-11-21 NOTE — PROGRESS NOTE ADULT - ASSESSMENT
Assessment:  89 year old female s/p   Exploratory laparotomy, dense adhesions of ileum to pelvis, lysed carefully with cautery. Internal hernia reduced. Dilated proximal small bowel milked back to the stomach, approx 700 cc feculent output drained via NGT.	    Plan:  -DVT PPX  -Pain control   -OOB as tolerated with assistance   -Advance diet as tolerated once passing GI function   -Await Gi Fxn

## 2018-11-22 LAB
ANION GAP SERPL CALC-SCNC: 12 MMOL/L — SIGNIFICANT CHANGE UP (ref 5–17)
BUN SERPL-MCNC: 11 MG/DL — SIGNIFICANT CHANGE UP (ref 7–23)
CALCIUM SERPL-MCNC: 8.1 MG/DL — LOW (ref 8.4–10.5)
CHLORIDE SERPL-SCNC: 101 MMOL/L — SIGNIFICANT CHANGE UP (ref 96–108)
CO2 SERPL-SCNC: 25 MMOL/L — SIGNIFICANT CHANGE UP (ref 22–31)
CREAT SERPL-MCNC: 0.53 MG/DL — SIGNIFICANT CHANGE UP (ref 0.5–1.3)
GLUCOSE SERPL-MCNC: 126 MG/DL — HIGH (ref 70–99)
HCT VFR BLD CALC: 27.4 % — LOW (ref 34.5–45)
HGB BLD-MCNC: 9.4 G/DL — LOW (ref 11.5–15.5)
MAGNESIUM SERPL-MCNC: 2 MG/DL — SIGNIFICANT CHANGE UP (ref 1.6–2.6)
MCHC RBC-ENTMCNC: 32.3 PG — SIGNIFICANT CHANGE UP (ref 27–34)
MCHC RBC-ENTMCNC: 34.4 GM/DL — SIGNIFICANT CHANGE UP (ref 32–36)
MCV RBC AUTO: 93.9 FL — SIGNIFICANT CHANGE UP (ref 80–100)
PHOSPHATE SERPL-MCNC: 3.2 MG/DL — SIGNIFICANT CHANGE UP (ref 2.5–4.5)
PLATELET # BLD AUTO: 163 K/UL — SIGNIFICANT CHANGE UP (ref 150–400)
POTASSIUM SERPL-MCNC: 3.9 MMOL/L — SIGNIFICANT CHANGE UP (ref 3.5–5.3)
POTASSIUM SERPL-SCNC: 3.9 MMOL/L — SIGNIFICANT CHANGE UP (ref 3.5–5.3)
RBC # BLD: 2.92 M/UL — LOW (ref 3.8–5.2)
RBC # FLD: 12.3 % — SIGNIFICANT CHANGE UP (ref 10.3–14.5)
SODIUM SERPL-SCNC: 138 MMOL/L — SIGNIFICANT CHANGE UP (ref 135–145)
TROPONIN T, HIGH SENSITIVITY RESULT: 17 NG/L — SIGNIFICANT CHANGE UP (ref 0–51)
WBC # BLD: 6.9 K/UL — SIGNIFICANT CHANGE UP (ref 3.8–10.5)
WBC # FLD AUTO: 6.9 K/UL — SIGNIFICANT CHANGE UP (ref 3.8–10.5)

## 2018-11-22 PROCEDURE — 99024 POSTOP FOLLOW-UP VISIT: CPT

## 2018-11-22 RX ADMIN — HEPARIN SODIUM 5000 UNIT(S): 5000 INJECTION INTRAVENOUS; SUBCUTANEOUS at 13:28

## 2018-11-22 RX ADMIN — PANTOPRAZOLE SODIUM 40 MILLIGRAM(S): 20 TABLET, DELAYED RELEASE ORAL at 13:27

## 2018-11-22 RX ADMIN — HEPARIN SODIUM 5000 UNIT(S): 5000 INJECTION INTRAVENOUS; SUBCUTANEOUS at 22:46

## 2018-11-22 RX ADMIN — HEPARIN SODIUM 5000 UNIT(S): 5000 INJECTION INTRAVENOUS; SUBCUTANEOUS at 05:23

## 2018-11-22 NOTE — PROGRESS NOTE ADULT - ASSESSMENT
89yo F h/o dementia p/w closed loop SBO, now s/p ex-lap, lysis internal hernia (11/18)    PLAN:  -issues with dysphagia, moved to NPO with sips  -DVT PPX  -Pain control   -OOB as tolerated with assistance     ATP SURGERY  [id number] 89yo F h/o dementia p/w closed loop SBO, now s/p ex-lap, lysis internal hernia (11/18)    PLAN:  -issues with dysphagia, moved to NPO with sips  -plan for speech study Friday  -DVT PPX  -Pain control   -OOB as tolerated with assistance     ATP SURGERY  [id number]

## 2018-11-23 LAB
ANION GAP SERPL CALC-SCNC: 10 MMOL/L — SIGNIFICANT CHANGE UP (ref 5–17)
BUN SERPL-MCNC: 8 MG/DL — SIGNIFICANT CHANGE UP (ref 7–23)
CALCIUM SERPL-MCNC: 8.5 MG/DL — SIGNIFICANT CHANGE UP (ref 8.4–10.5)
CHLORIDE SERPL-SCNC: 102 MMOL/L — SIGNIFICANT CHANGE UP (ref 96–108)
CO2 SERPL-SCNC: 24 MMOL/L — SIGNIFICANT CHANGE UP (ref 22–31)
CREAT SERPL-MCNC: 0.54 MG/DL — SIGNIFICANT CHANGE UP (ref 0.5–1.3)
GLUCOSE SERPL-MCNC: 119 MG/DL — HIGH (ref 70–99)
HCT VFR BLD CALC: 29.6 % — LOW (ref 34.5–45)
HGB BLD-MCNC: 10.2 G/DL — LOW (ref 11.5–15.5)
MAGNESIUM SERPL-MCNC: 1.8 MG/DL — SIGNIFICANT CHANGE UP (ref 1.6–2.6)
MCHC RBC-ENTMCNC: 32.5 PG — SIGNIFICANT CHANGE UP (ref 27–34)
MCHC RBC-ENTMCNC: 34.5 GM/DL — SIGNIFICANT CHANGE UP (ref 32–36)
MCV RBC AUTO: 94.1 FL — SIGNIFICANT CHANGE UP (ref 80–100)
PHOSPHATE SERPL-MCNC: 2.4 MG/DL — LOW (ref 2.5–4.5)
PLATELET # BLD AUTO: 178 K/UL — SIGNIFICANT CHANGE UP (ref 150–400)
POTASSIUM SERPL-MCNC: 4 MMOL/L — SIGNIFICANT CHANGE UP (ref 3.5–5.3)
POTASSIUM SERPL-SCNC: 4 MMOL/L — SIGNIFICANT CHANGE UP (ref 3.5–5.3)
RBC # BLD: 3.15 M/UL — LOW (ref 3.8–5.2)
RBC # FLD: 12.2 % — SIGNIFICANT CHANGE UP (ref 10.3–14.5)
SODIUM SERPL-SCNC: 136 MMOL/L — SIGNIFICANT CHANGE UP (ref 135–145)
WBC # BLD: 4.4 K/UL — SIGNIFICANT CHANGE UP (ref 3.8–10.5)
WBC # FLD AUTO: 4.4 K/UL — SIGNIFICANT CHANGE UP (ref 3.8–10.5)

## 2018-11-23 RX ORDER — ZINC OXIDE 200 MG/G
1 OINTMENT TOPICAL
Qty: 0 | Refills: 0 | Status: DISCONTINUED | OUTPATIENT
Start: 2018-11-23 | End: 2018-11-26

## 2018-11-23 RX ORDER — MAGNESIUM SULFATE 500 MG/ML
2 VIAL (ML) INJECTION ONCE
Qty: 0 | Refills: 0 | Status: COMPLETED | OUTPATIENT
Start: 2018-11-23 | End: 2018-11-23

## 2018-11-23 RX ADMIN — Medication 50 GRAM(S): at 11:47

## 2018-11-23 RX ADMIN — HEPARIN SODIUM 5000 UNIT(S): 5000 INJECTION INTRAVENOUS; SUBCUTANEOUS at 05:43

## 2018-11-23 RX ADMIN — HEPARIN SODIUM 5000 UNIT(S): 5000 INJECTION INTRAVENOUS; SUBCUTANEOUS at 14:14

## 2018-11-23 RX ADMIN — HEPARIN SODIUM 5000 UNIT(S): 5000 INJECTION INTRAVENOUS; SUBCUTANEOUS at 21:58

## 2018-11-23 RX ADMIN — PANTOPRAZOLE SODIUM 40 MILLIGRAM(S): 20 TABLET, DELAYED RELEASE ORAL at 11:46

## 2018-11-23 RX ADMIN — Medication 62.5 MILLIMOLE(S): at 14:19

## 2018-11-23 NOTE — SWALLOW BEDSIDE ASSESSMENT ADULT - COMMENTS
*** Case d/w PA: trae Agustin cleared for PO intake of solids and thickened liquids foe exam purposes.

## 2018-11-23 NOTE — SWALLOW BEDSIDE ASSESSMENT ADULT - SWALLOW EVAL: DIAGNOSIS
Pt presents with an oral and suspected pharyngeal dysphagia marked by suspected uncontrolled loss, delayed pharyngeal swallows, audible (suspect discoordinated) swallows, and subtle throat clears vs weak cough post PO intake of all consistencies administered.

## 2018-11-23 NOTE — SWALLOW BEDSIDE ASSESSMENT ADULT - SLP GENERAL OBSERVATIONS
Pt awake in bed, able to communicate needs and follow simple directions for exam. Very [place]. C/o pain on backside, RN aware. Intermittent subtle wet vocal quality.

## 2018-11-23 NOTE — SWALLOW BEDSIDE ASSESSMENT ADULT - ASR SWALLOW RECOMMEND DIAG
Suggest MBS testing to further assess swallow function if in keeping with pt and family wishes and if cleared by surgeon (given recent h/o SBO).

## 2018-11-23 NOTE — PROGRESS NOTE ADULT - ASSESSMENT
89yo F h/o dementia p/w closed loop SBO, now s/p ex-lap, lysis internal hernia (11/18)    PLAN:  -issues with dysphagia, but tolerating thick fluids  -plan for speech study Friday 11/23  -DVT PPX  -Pain control   -OOB as tolerated with assistance     Dispo: await return of bowel function, speech recs    ATP SURGERY  [id number]

## 2018-11-23 NOTE — SWALLOW BEDSIDE ASSESSMENT ADULT - PHARYNGEAL PHASE
subtle cough vs throat clear post PO intake, audible (suspect discoordinated) swallows. + episodes of multiple swallows 1-3/Delayed pharyngeal swallow/Decreased laryngeal elevation

## 2018-11-23 NOTE — SWALLOW BEDSIDE ASSESSMENT ADULT - SWALLOW EVAL: RECOMMENDED DIET
Given audible (suspect discoordinated) swallows in conjunction with subtle throat clears vs cough recommend NPO, with non-oral nutrition/hydration/medications if in keeping with pt and family wishes.

## 2018-11-24 LAB
ANION GAP SERPL CALC-SCNC: 11 MMOL/L — SIGNIFICANT CHANGE UP (ref 5–17)
BUN SERPL-MCNC: 10 MG/DL — SIGNIFICANT CHANGE UP (ref 7–23)
CALCIUM SERPL-MCNC: 8.4 MG/DL — SIGNIFICANT CHANGE UP (ref 8.4–10.5)
CHLORIDE SERPL-SCNC: 103 MMOL/L — SIGNIFICANT CHANGE UP (ref 96–108)
CO2 SERPL-SCNC: 24 MMOL/L — SIGNIFICANT CHANGE UP (ref 22–31)
CREAT SERPL-MCNC: 0.61 MG/DL — SIGNIFICANT CHANGE UP (ref 0.5–1.3)
GLUCOSE SERPL-MCNC: 118 MG/DL — HIGH (ref 70–99)
HCT VFR BLD CALC: 29.7 % — LOW (ref 34.5–45)
HGB BLD-MCNC: 10.1 G/DL — LOW (ref 11.5–15.5)
MAGNESIUM SERPL-MCNC: 2.1 MG/DL — SIGNIFICANT CHANGE UP (ref 1.6–2.6)
MCHC RBC-ENTMCNC: 31.8 PG — SIGNIFICANT CHANGE UP (ref 27–34)
MCHC RBC-ENTMCNC: 34 GM/DL — SIGNIFICANT CHANGE UP (ref 32–36)
MCV RBC AUTO: 93.6 FL — SIGNIFICANT CHANGE UP (ref 80–100)
PHOSPHATE SERPL-MCNC: 3.3 MG/DL — SIGNIFICANT CHANGE UP (ref 2.5–4.5)
PLATELET # BLD AUTO: 203 K/UL — SIGNIFICANT CHANGE UP (ref 150–400)
POTASSIUM SERPL-MCNC: 4 MMOL/L — SIGNIFICANT CHANGE UP (ref 3.5–5.3)
POTASSIUM SERPL-SCNC: 4 MMOL/L — SIGNIFICANT CHANGE UP (ref 3.5–5.3)
RBC # BLD: 3.17 M/UL — LOW (ref 3.8–5.2)
RBC # FLD: 12.4 % — SIGNIFICANT CHANGE UP (ref 10.3–14.5)
SODIUM SERPL-SCNC: 138 MMOL/L — SIGNIFICANT CHANGE UP (ref 135–145)
WBC # BLD: 4.9 K/UL — SIGNIFICANT CHANGE UP (ref 3.8–10.5)
WBC # FLD AUTO: 4.9 K/UL — SIGNIFICANT CHANGE UP (ref 3.8–10.5)

## 2018-11-24 RX ADMIN — HEPARIN SODIUM 5000 UNIT(S): 5000 INJECTION INTRAVENOUS; SUBCUTANEOUS at 14:03

## 2018-11-24 RX ADMIN — DEXTROSE MONOHYDRATE, SODIUM CHLORIDE, AND POTASSIUM CHLORIDE 50 MILLILITER(S): 50; .745; 4.5 INJECTION, SOLUTION INTRAVENOUS at 01:15

## 2018-11-24 RX ADMIN — HEPARIN SODIUM 5000 UNIT(S): 5000 INJECTION INTRAVENOUS; SUBCUTANEOUS at 05:42

## 2018-11-24 RX ADMIN — HEPARIN SODIUM 5000 UNIT(S): 5000 INJECTION INTRAVENOUS; SUBCUTANEOUS at 21:41

## 2018-11-24 RX ADMIN — PANTOPRAZOLE SODIUM 40 MILLIGRAM(S): 20 TABLET, DELAYED RELEASE ORAL at 12:44

## 2018-11-24 NOTE — PROGRESS NOTE ADULT - ASSESSMENT
89yo F h/o dementia p/w closed loop SBO, now s/p ex-lap, lysis internal hernia (11/18)    PLAN:  -issues with dysphagia, per speech/swallow eval she is at high risk of aspiration, so made NPO  - patient had prior DNR/DNI but no current proof of this on file, so full code.  Need to get in touch with Guarantor, Alexey Lopez, to clarify DNR/DNI status  - If can confirm DNR status, can undertake GOC discussion with Guarantor regarding comfort feeds vs. avoiding risk of aspiration with potential feeding tube.  Attempted to call but no answer.   -DVT PPX  -Pain control   -OOB as tolerated with assistance

## 2018-11-25 LAB
ANION GAP SERPL CALC-SCNC: 14 MMOL/L — SIGNIFICANT CHANGE UP (ref 5–17)
BUN SERPL-MCNC: 9 MG/DL — SIGNIFICANT CHANGE UP (ref 7–23)
CALCIUM SERPL-MCNC: 8.6 MG/DL — SIGNIFICANT CHANGE UP (ref 8.4–10.5)
CHLORIDE SERPL-SCNC: 102 MMOL/L — SIGNIFICANT CHANGE UP (ref 96–108)
CO2 SERPL-SCNC: 22 MMOL/L — SIGNIFICANT CHANGE UP (ref 22–31)
CREAT SERPL-MCNC: 0.62 MG/DL — SIGNIFICANT CHANGE UP (ref 0.5–1.3)
GLUCOSE SERPL-MCNC: 106 MG/DL — HIGH (ref 70–99)
HCT VFR BLD CALC: 27.9 % — LOW (ref 34.5–45)
HGB BLD-MCNC: 9.7 G/DL — LOW (ref 11.5–15.5)
MAGNESIUM SERPL-MCNC: 1.9 MG/DL — SIGNIFICANT CHANGE UP (ref 1.6–2.6)
MCHC RBC-ENTMCNC: 32.7 PG — SIGNIFICANT CHANGE UP (ref 27–34)
MCHC RBC-ENTMCNC: 34.8 GM/DL — SIGNIFICANT CHANGE UP (ref 32–36)
MCV RBC AUTO: 93.8 FL — SIGNIFICANT CHANGE UP (ref 80–100)
PHOSPHATE SERPL-MCNC: 2.9 MG/DL — SIGNIFICANT CHANGE UP (ref 2.5–4.5)
PLATELET # BLD AUTO: 227 K/UL — SIGNIFICANT CHANGE UP (ref 150–400)
POTASSIUM SERPL-MCNC: 4.1 MMOL/L — SIGNIFICANT CHANGE UP (ref 3.5–5.3)
POTASSIUM SERPL-SCNC: 4.1 MMOL/L — SIGNIFICANT CHANGE UP (ref 3.5–5.3)
RBC # BLD: 2.98 M/UL — LOW (ref 3.8–5.2)
RBC # FLD: 12.6 % — SIGNIFICANT CHANGE UP (ref 10.3–14.5)
SODIUM SERPL-SCNC: 138 MMOL/L — SIGNIFICANT CHANGE UP (ref 135–145)
WBC # BLD: 6.3 K/UL — SIGNIFICANT CHANGE UP (ref 3.8–10.5)
WBC # FLD AUTO: 6.3 K/UL — SIGNIFICANT CHANGE UP (ref 3.8–10.5)

## 2018-11-25 PROCEDURE — 93010 ELECTROCARDIOGRAM REPORT: CPT

## 2018-11-25 RX ADMIN — HEPARIN SODIUM 5000 UNIT(S): 5000 INJECTION INTRAVENOUS; SUBCUTANEOUS at 13:44

## 2018-11-25 RX ADMIN — HEPARIN SODIUM 5000 UNIT(S): 5000 INJECTION INTRAVENOUS; SUBCUTANEOUS at 21:15

## 2018-11-25 RX ADMIN — HEPARIN SODIUM 5000 UNIT(S): 5000 INJECTION INTRAVENOUS; SUBCUTANEOUS at 05:20

## 2018-11-25 RX ADMIN — PANTOPRAZOLE SODIUM 40 MILLIGRAM(S): 20 TABLET, DELAYED RELEASE ORAL at 11:17

## 2018-11-25 NOTE — PROVIDER CONTACT NOTE (OTHER) - REASON
4.8 seconds A.fib up to 190's on remote tele monitoring
PAT up to 150 lasting 3sec
PATs to 161 for 4 seconds
PATs to 183 for 8 seconds
PT'S BLOOD PRESSURE LAST Night. was 175/69
Pt hypertensive
pt have trouble with clear fluid
pt in PAT- flutter 151
pt pulled out NG tube
4.1 seconds of PAT to 130s

## 2018-11-25 NOTE — PROVIDER CONTACT NOTE (OTHER) - DATE AND TIME:
25-Nov-2018 04:30
19-Nov-2018 00:26
19-Nov-2018 04:55
20-Nov-2018 01:45
20-Nov-2018 20:00
21-Nov-2018 14:00
21-Nov-2018 14:45
23-Nov-2018 08:16
25-Nov-2018 05:20
22-Nov-2018 03:26

## 2018-11-25 NOTE — PROGRESS NOTE ADULT - ASSESSMENT
89yo F h/o dementia p/w closed loop SBO, now s/p ex-lap, lysis internal hernia (11/18)    PLAN:  - DNR/DNI status clarified with healthcare proxy, Alexey Lopez  - Regular diet per healthcare proxy, understands risk of aspiration  - DVT PPX - SQH  - Pain control   - OOB as tolerated with assistance     Dispo: [facility], awaiting placement

## 2018-11-25 NOTE — PROVIDER CONTACT NOTE (OTHER) - ACTION/TREATMENT ORDERED:
provider aware. ok to draw troponins with AM labs. RN will draw @ 0500.
none at this time
provider aware. no interventions at this time. will continue to montior pt
As per ROLANDO Auguste, will come to bedside to assess pt & will attempt to replace NGT. maintain HOB elevated. No further intervention necessary at this time, will continue to monitor.
As per ROLANDO Toussaint, obtain 12 lead EKG. no further intervention necessary, will continue to monitor.
Cardizem administered as per standing order.
PAs aware, okay to given IVP cardizem an hour early and reassess.
made team aware EKG done and tropinin ordered.
made team aware, per team will order speech and swallow
provider aware, EKG&labs done will continue to monitor pt.

## 2018-11-25 NOTE — PROVIDER CONTACT NOTE (OTHER) - BACKGROUND
pt with closed loop SBO. pt had similar event yesterday evening 11/21, leading to troponins x3 and EKG. 2 sets of troponins were WNL.
pt with closed loop SBO
89 yo female admitted w/ SBO s/p xlap, NPO currently
SBO
pt admitted for small bowel obstruction
pt admitted on 11/17 for bowel obstruction. x-lap completed. hx of dementia, CAD, HTN & pacemaker.
pt admitted on 11/17 w/ bowel obstruction, x-lap completed. hx of dementia, CAD, HTN, pacemaker.
pt with SBO- xlap
pt with closed loop SBO
pt with coby

## 2018-11-25 NOTE — PROVIDER CONTACT NOTE (OTHER) - RECOMMENDATIONS
notify provider. provider due for 3rd and last set of troponins at 0300. RN asking MD if AM labs can be drawn early alongside of troponins.
notify provider
EKG
None
Notify PA.
Notify PA.
Notify covering PA, given IVP cardizem early, continue to monitor for s/s or changes
notify provider. EKG&labs.
speech and swallow

## 2018-11-25 NOTE — PROGRESS NOTE ADULT - ATTENDING COMMENTS
seen and examined 11-21-18 @ 0913    tolerating sips  +flatus / no BM    soft / NT / ND / LUQ tympanitic  laparotomy incision stapled closed without evidence of wound infection    concern for aspiration  -speech therapy eval    s/p ex-lap / TORRES on 11/18/2018 for incarcerated internal hernia  -await speech therapy eval prior to advancing diet
seen and examined 11-19-18 @ 0925    soft / NT / mildly distended / normoactive bowel sounds  laparotomy incision stapled closed without evidence of wound infection    s/p ex-lap / TORRES on 11/18/2018 for incarcerated internal hernia  -NPO / IVF until post-op ileus resolves
seen and examined 11-20-18 @ 0817    tolerating sips  no BM  no nausea or vomiting    soft / NT / mildly distended / tympanitic throughout  laparotomy incision stapled closed without evidence of wound infection    s/p ex-lap / TORRES on 11/18/2018 for incarcerated internal hernia  -continue sips until abdominal distension resolves or she has a BM
Patient seen and examined  Doing well  No complaints  Tolerating diet well    - ok to d/c to rehab when bed avaiable.
seen and examined 11-21-18 @ 0935    NPO  +flatus / no BM    soft / NT / mildly distended / LUQ/RUQ tympanitic  normoactive bowel sounds  laparotomy incision stapled closed without evidence of wound infection    s/p ex-lap / TORRES on 11/18/2018 for incarcerated internal hernia  -start clears
seen and examined 11-22-18     tolerating sips  +flatus / no BM    soft / NT / ND / LUQ tympanitic  laparotomy incision stapled closed without evidence of wound infection    concern for aspiration  -speech therapy eval    s/p ex-lap / TORRES on 11/18/2018 for incarcerated internal hernia  -await speech therapy eval prior to advancing diet

## 2018-11-25 NOTE — PROVIDER CONTACT NOTE (OTHER) - ASSESSMENT
vss. pt a&ox4. pt on tele and cpox. pt denies chest pain, SOB, palpitations. pt resting comfortably in bed. pt HR on monitor now 68
vss. pt , in no distress pt denies chest pain, SOB, palpitations. pt verbalized she has" been asleep the whole night"
Pt A&Ox4, lethargic but arousable to stimuli, denies pain but abdomen tender to palpation. /95 electronic, 164/72 manual. all other VSS
Pt sleeping at time of PAT, awake & arousable, A&Ox4, denied chest pain & SOB, /74, HR 60-63
pt asymptomatic
pt coughing with drinking and sound very congested. Nurse fear patient may aspirate
pt is A&Ox4, able to verbalize understanding. pt has no complaints of chest pain, sob, lightheadedness, dizziness, n/v at this time. pt on 2L NC, remote tele & Cpox monitor maintained. /64, HR 75. NGT to LWS maintained, dark brown output noted, no discomfort noted. zee remains intact w/ clear yellow output, no discomfort. frequent cough noted, oral suction performed as needed to remove secretions. pt pending 1st oob.
pt is A&Ox4, pt is lethargic, but is able to verbalize understanding. pt was found by SADIQ Lopez attempting to get OOB, NGT & IV were on the floor. Pt stated that she woke up disoriented and wanted to use the bathroom to urinate. pt did not remember that she currently has a zee catheter in place. SADIQ Lopez reorientated pt to situation. Bed alarm in place for safety. Remote tele & Cpox maintained, pt on 2L NC w/ no chest pain, sob, lightheadedness, dizziness, n/v noted.
pt is asymptomatic
vss. pt a&ox4. RN was in room with pt adminstering IV cardizem as ordered, tele tech called and endorsed to RN pt had tele event. pt denies discomfort, chest pain, palpitations.

## 2018-11-26 VITALS
DIASTOLIC BLOOD PRESSURE: 76 MMHG | RESPIRATION RATE: 16 BRPM | SYSTOLIC BLOOD PRESSURE: 160 MMHG | OXYGEN SATURATION: 98 % | TEMPERATURE: 98 F | HEART RATE: 59 BPM

## 2018-11-26 PROCEDURE — 99285 EMERGENCY DEPT VISIT HI MDM: CPT | Mod: 25

## 2018-11-26 PROCEDURE — 83690 ASSAY OF LIPASE: CPT

## 2018-11-26 PROCEDURE — 86900 BLOOD TYPING SEROLOGIC ABO: CPT

## 2018-11-26 PROCEDURE — 97530 THERAPEUTIC ACTIVITIES: CPT

## 2018-11-26 PROCEDURE — 86901 BLOOD TYPING SEROLOGIC RH(D): CPT

## 2018-11-26 PROCEDURE — 84100 ASSAY OF PHOSPHORUS: CPT

## 2018-11-26 PROCEDURE — 83605 ASSAY OF LACTIC ACID: CPT

## 2018-11-26 PROCEDURE — 85014 HEMATOCRIT: CPT

## 2018-11-26 PROCEDURE — 80048 BASIC METABOLIC PNL TOTAL CA: CPT

## 2018-11-26 PROCEDURE — 85610 PROTHROMBIN TIME: CPT

## 2018-11-26 PROCEDURE — 84295 ASSAY OF SERUM SODIUM: CPT

## 2018-11-26 PROCEDURE — 74018 RADEX ABDOMEN 1 VIEW: CPT

## 2018-11-26 PROCEDURE — 86850 RBC ANTIBODY SCREEN: CPT

## 2018-11-26 PROCEDURE — 97161 PT EVAL LOW COMPLEX 20 MIN: CPT

## 2018-11-26 PROCEDURE — 74177 CT ABD & PELVIS W/CONTRAST: CPT

## 2018-11-26 PROCEDURE — 71045 X-RAY EXAM CHEST 1 VIEW: CPT

## 2018-11-26 PROCEDURE — 80053 COMPREHEN METABOLIC PANEL: CPT

## 2018-11-26 PROCEDURE — 92610 EVALUATE SWALLOWING FUNCTION: CPT

## 2018-11-26 PROCEDURE — 82553 CREATINE MB FRACTION: CPT

## 2018-11-26 PROCEDURE — 84484 ASSAY OF TROPONIN QUANT: CPT

## 2018-11-26 PROCEDURE — 84132 ASSAY OF SERUM POTASSIUM: CPT

## 2018-11-26 PROCEDURE — 82435 ASSAY OF BLOOD CHLORIDE: CPT

## 2018-11-26 PROCEDURE — 82803 BLOOD GASES ANY COMBINATION: CPT

## 2018-11-26 PROCEDURE — 82947 ASSAY GLUCOSE BLOOD QUANT: CPT

## 2018-11-26 PROCEDURE — 83735 ASSAY OF MAGNESIUM: CPT

## 2018-11-26 PROCEDURE — 85730 THROMBOPLASTIN TIME PARTIAL: CPT

## 2018-11-26 PROCEDURE — 93005 ELECTROCARDIOGRAM TRACING: CPT

## 2018-11-26 PROCEDURE — 96375 TX/PRO/DX INJ NEW DRUG ADDON: CPT

## 2018-11-26 PROCEDURE — 82550 ASSAY OF CK (CPK): CPT

## 2018-11-26 PROCEDURE — 97116 GAIT TRAINING THERAPY: CPT

## 2018-11-26 PROCEDURE — 85027 COMPLETE CBC AUTOMATED: CPT

## 2018-11-26 PROCEDURE — 81001 URINALYSIS AUTO W/SCOPE: CPT

## 2018-11-26 PROCEDURE — 82330 ASSAY OF CALCIUM: CPT

## 2018-11-26 PROCEDURE — 96374 THER/PROPH/DIAG INJ IV PUSH: CPT | Mod: XU

## 2018-11-26 RX ADMIN — HEPARIN SODIUM 5000 UNIT(S): 5000 INJECTION INTRAVENOUS; SUBCUTANEOUS at 13:33

## 2018-11-26 RX ADMIN — HEPARIN SODIUM 5000 UNIT(S): 5000 INJECTION INTRAVENOUS; SUBCUTANEOUS at 05:56

## 2018-11-26 RX ADMIN — PANTOPRAZOLE SODIUM 40 MILLIGRAM(S): 20 TABLET, DELAYED RELEASE ORAL at 11:34

## 2018-11-26 NOTE — PROGRESS NOTE ADULT - SUBJECTIVE AND OBJECTIVE BOX
[facility] ATP SURGERY DAILY PROGRESS NOTE      SUBJECTIVE:    -  No pain.  Passing flatus and stool, tolerating regular diet without respiratory complications      OBJECTIVE:    Vital Signs Last 24 Hrs  T(C): 36.8 (26 Nov 2018 10:48), Max: 36.9 (25 Nov 2018 16:56)  T(F): 98.2 (26 Nov 2018 10:48), Max: 98.4 (25 Nov 2018 16:56)  HR: 55 (26 Nov 2018 10:48) (55 - 77)  BP: 151/72 (26 Nov 2018 10:48) (115/55 - 151/72)  BP(mean): --  RR: 16 (26 Nov 2018 10:48) (14 - 17)  SpO2: 98% (26 Nov 2018 10:48) (94% - 98%)    I&O's Detail    25 Nov 2018 07:01  -  26 Nov 2018 07:00  --------------------------------------------------------  IN:    Oral Fluid: 725 mL  Total IN: 725 mL    OUT:    Voided: 120 mL  Total OUT: 120 mL    Total NET: 605 mL        LABS:                        9.7    6.3   )-----------( 227      ( 25 Nov 2018 06:46 )             27.9     11-25    138  |  102  |  9   ----------------------------<  106<H>  4.1   |  22  |  0.62    Ca    8.6      25 Nov 2018 06:46  Phos  2.9     11-25  Mg     1.9     11-25      EXAM:  Gen: NAD, resting comfortably in bed  Resp: No increased WOB on RA    Abd: nontender, mild distension, soft, compressible.  midline staples intact, well-healing incision without erythema/discharge  Extr: WWP distally, cap refill <2s
GENERAL SURGERY DAILY PROGRESS NOTE:     Subjective:    Patient was seen and examined this morning during morning rounds. Pt complaining of abdominal pain postoperatively. Pain controlled with IV tylenol and prn morphine. NGT is in place. No GI function.    Objective:    NAD, awake and alert  Respirations nonlabored  NGT in place to suction  Abdomen soft, tender to palpation, nondistended  No guarding or rebound tenderness     MEDICATIONS  (STANDING):  acetaminophen  IVPB .. 750 milliGRAM(s) IV Intermittent once  acetaminophen  IVPB .. 750 milliGRAM(s) IV Intermittent once  diltiazem Injectable 5 milliGRAM(s) IV Push every 4 hours  heparin  Injectable 5000 Unit(s) SubCutaneous every 8 hours  lactated ringers. 1000 milliLiter(s) (50 mL/Hr) IV Continuous <Continuous>  pantoprazole  Injectable 40 milliGRAM(s) IV Push daily    MEDICATIONS  (PRN):  ondansetron Injectable 4 milliGRAM(s) IV Push once PRN Nausea and/or Vomiting      Vital Signs Last 24 Hrs  T(C): 36.6 (2018 13:21), Max: 36.7 (2018 11:00)  T(F): 97.9 (2018 13:21), Max: 98.1 (2018 11:00)  HR: 74 (2018 13:21) (61 - 149)  BP: 126/73 (2018 13:21) (88/46 - 147/75)  BP(mean): 70 (2018 08:00) (64 - 97)  RR: 16 (2018 13:21) (14 - 18)  SpO2: 100% (2018 13:21) (94% - 100%)    I&O's Detail    2018 07:01  -  2018 07:00  --------------------------------------------------------  IN:    IV PiggyBack: 200 mL    lactated ringers.: 250 mL    Sodium Chloride 0.9% IV Bolus: 1000 mL  Total IN: 1450 mL    OUT:    Indwelling Catheter - Urethral: 810 mL    Nasoenteral Tube: 1500 mL  Total OUT: 2310 mL    Total NET: -860 mL      2018 07:01  -  2018 14:05  --------------------------------------------------------  IN:    IV PiggyBack: 100 mL    lactated ringers.: 150 mL    Sodium Chloride 0.9% IV Bolus: 500 mL  Total IN: 750 mL    OUT:    Indwelling Catheter - Urethral: 750 mL    Nasoenteral Tube: 50 mL  Total OUT: 800 mL    Total NET: -50 mL          Daily     Daily     LABS:                        9.9    3.2   )-----------( 160      ( 2018 03:55 )             30.2         145  |  105  |  38<H>  ----------------------------<  96  3.3<L>   |  26  |  0.77    Ca    7.3<L>      2018 09:21  Phos  2.9       Mg     2.0         TPro  7.9  /  Alb  4.8  /  TBili  0.6  /  DBili  x   /  AST  17  /  ALT  13  /  AlkPhos  61      PT/INR - ( 2018 17:32 )   PT: 11.4 sec;   INR: 1.00 ratio         PTT - ( 2018 17:32 )  PTT:25.9 sec  Urinalysis Basic - ( 2018 19:01 )    Color: Yellow / Appearance: Clear / S.049 / pH: x  Gluc: x / Ketone: Trace  / Bili: Negative / Urobili: Negative   Blood: x / Protein: Trace / Nitrite: Negative   Leuk Esterase: Negative / RBC: 6 /hpf / WBC 1 /hpf   Sq Epi: x / Non Sq Epi: 1 /hpf / Bacteria: Negative
GENERAL SURGERY DAILY PROGRESS NOTE:     Subjective: Patient seen and examined. Patient stable with no overnight events. Patient denies CP/ SOB/ Palpatations. Patient denies N/V. Reports No flatus and No BM. NGT fell out last night.     MEDICATIONS  (STANDING):  diltiazem Injectable 5 milliGRAM(s) IV Push every 4 hours  heparin  Injectable 5000 Unit(s) SubCutaneous every 8 hours  lactated ringers. 1000 milliLiter(s) (50 mL/Hr) IV Continuous <Continuous>  pantoprazole  Injectable 40 milliGRAM(s) IV Push daily    Vital Signs Last 24 Hrs:   T(C): 37.1 (2018 05:31), Max: 37.1 (2018 20:51)  T(F): 98.7 (2018 05:31), Max: 98.7 (2018 20:51)  HR: 72 (2018 05:31) (62 - 80)  BP: 130/74 (2018 05:31) (106/62 - 130/74)  BP(mean): --  RR: 17 (2018 05:31) (16 - 19)  SpO2: 96% (2018 05:31) (95% - 100%)    I&O's Detail:  2018 07:01  -  2018 07:00  --------------------------------------------------------  IN:    IV PiggyBack: 500 mL    lactated ringers.: 1200 mL    Sodium Chloride 0.9% IV Bolus: 500 mL  Total IN: 2200 mL  OUT:    Indwelling Catheter - Urethral: 1300 mL    Nasoenteral Tube: 600 mL  Total OUT: 1900 mL  Total NET: 300 mL    LABS:                      11.1   8.0   )-----------( 194      ( 2018 07:16 )             33.4       144  |  102  |  37<H>  ----------------------------<  66<L>  4.4   |  24  |  0.82    Ca    8.4      2018 07:16  Phos  3.6       Mg     2.3         TPro  7.9  /  Alb  4.8  /  TBili  0.6  /  DBili  x   /  AST  17  /  ALT  13  /  AlkPhos  61    PT/INR - ( 2018 17:32 )   PT: 11.4 sec;   INR: 1.00 ratio    PTT - ( 2018 17:32 )  PTT:25.9 sec  Urinalysis Basic - ( 2018 19:01 )    Color: Yellow / Appearance: Clear / S.049 / pH: x  Gluc: x / Ketone: Trace  / Bili: Negative / Urobili: Negative   Blood: x / Protein: Trace / Nitrite: Negative   Leuk Esterase: Negative / RBC: 6 /hpf / WBC 1 /hpf   Sq Epi: x / Non Sq Epi: 1 /hpf / Bacteria: Negative    Physical Exam:   Gen: NAD, Awake and alert   Abdomen: Soft, NT, ND     Assessment:    89y Female who presents with Small bowel obstruction    Plan:  -DVT PPX  -Pain control   -OOB as tolerated with assistance   -Advance diet as tolerated, once passing gas   -Await Gi Fxn   -Dispo Planning     Tootie Bourgeois   #[id number] 18 @ 10:39
GENERAL SURGERY DAILY PROGRESS NOTE:     Subjective: Patient seen and examined. Patient stable with no overnight events. Patient denies CP/ SOB/ Palpatations. Patient denies N/V. Reports No flatus and No BM. NGT fell out over night on early Monday AM. Patient kept NPO.     MEDICATIONS  (STANDING):  dextrose 5% + sodium chloride 0.45%. 1000 milliLiter(s) (50 mL/Hr) IV Continuous <Continuous>  diltiazem Injectable 5 milliGRAM(s) IV Push every 4 hours  heparin  Injectable 5000 Unit(s) SubCutaneous every 8 hours  pantoprazole  Injectable 40 milliGRAM(s) IV Push daily    Vital Signs Last 24 Hrs  T(C): 37 (20 Nov 2018 09:02), Max: 37.2 (19 Nov 2018 18:09)  T(F): 98.6 (20 Nov 2018 09:02), Max: 99 (19 Nov 2018 18:09)  HR: 56 (20 Nov 2018 07:07) (56 - 93)  BP: 156/76 (20 Nov 2018 07:07) (122/70 - 176/95)  RR: 18 (20 Nov 2018 09:02) (16 - 19)  SpO2: 95% (20 Nov 2018 09:02) (95% - 100%)    I&O's Detail  19 Nov 2018 07:01  -  20 Nov 2018 07:00  --------------------------------------------------------  IN:    dextrose 5% + sodium chloride 0.45%.: 600 mL    lactated ringers.: 400 mL  Total IN: 1000 mL    OUT:    Indwelling Catheter - Urethral: 825 mL  Total OUT: 825 mL  Total NET: 175 mL      LABS:                        10.1   6.3   )-----------( 178      ( 20 Nov 2018 08:32 )             29.9     11-20    146<H>  |  106  |  27<H>  ----------------------------<  117<H>  3.3<L>   |  27  |  0.75    Ca    8.5      20 Nov 2018 08:32  Phos  1.6     11-20  Mg     2.3     11-20      Physical Exam:   Gen: NAD, Awake and alert   Abdomen: soft, mild distention, non tender       Assessment:  89 year old female s/p   Exploratory laparotomy, dense adhesions of ileum to pelvis, lysed carefully with cautery. Internal hernia reduced. Dilated proximal small bowel milked back to the stomach, approx 700 cc feculent output drained via NGT.	    Plan:  -DVT PPX  -Pain control   -OOB as tolerated with assistance   -Advance diet as tolerated once passing GI function   -Await Gi Fxn       Tootie Bourgeois   #[id number] 11-20-18 @ 09:16
[facility] ATP SURGERY DAILY PROGRESS NOTE      SUBJECTIVE:    The patient was seen and examined at bedside this morning.  -  mildly distended  -  no longer gurgling on sips of H2O  -  passing flatus but no bm      OBJECTIVE:    Vital Signs Last 24 Hrs  T(C): 36.7 (23 Nov 2018 05:40), Max: 37.1 (22 Nov 2018 22:08)  T(F): 98 (23 Nov 2018 05:40), Max: 98.7 (22 Nov 2018 22:08)  HR: 62 (23 Nov 2018 05:40) (60 - 65)  BP: 166/80 (23 Nov 2018 05:40) (147/71 - 166/80)  BP(mean): --  RR: 20 (23 Nov 2018 05:40) (16 - 20)  SpO2: 96% (23 Nov 2018 05:40) (96% - 99%)    I&O's Detail    22 Nov 2018 07:01  -  23 Nov 2018 07:00  --------------------------------------------------------  IN:    dextrose 5% + sodium chloride 0.45% with potassium chloride 20 mEq/L: 1150 mL    Oral Fluid: 240 mL  Total IN: 1390 mL    OUT:    Voided: 900 mL  Total OUT: 900 mL    Total NET: 490 mL        LABS:                        9.4    6.9   )-----------( 163      ( 22 Nov 2018 06:22 )             27.4     11-22    138  |  101  |  11  ----------------------------<  126<H>  3.9   |  25  |  0.53    Ca    8.1<L>      22 Nov 2018 06:22  Phos  3.2     11-22  Mg     2.0     11-22        EXAM:  Gen: NAD, resting comfortably in bed  Resp: No increased WOB on NC  Abd: Midline staples intact, incision intact/well-healing.  Mildly distended, nontender, soft, compressible  Extr: WWP distally, cap refill <2s
Patient seen and examined  Doing well  Awake, alert, oriented (heard of hearing)  vitals stable  abd - nondistended, nontender, soft    - resolving SBO  - labs stable  - Patient with cough.  I have discussed cough and possible aspiration with patient and HCP nephew) at length.  Both state that the patient has had a cough for months to years.  They do not feel that it is secondary to aspiration.  They both feel that the patient would NOT want a feeding tube regardless of the results of a swallowing evaluation and/or imaging.  They both are requesting a regular diet.  I have discussed the possible risk of aspiration.  Will respect the patient's and HCP's wishes.  - Will need rehab before discharge back to assisted living.  Nephew is requesting [facility].  Will work on rehab admission and transfer on Monday.
Trauma Surgery Progress Note    Subjective:   Pt seen & examined at bedside, no acute events overnight. Pain is well controlled.  No F/C, N/V, CP/SOB.  Passing flatus and stool, tolerating regular diet.     Medications:  diltiazem Injectable 5  heparin  Injectable 5000      Objective:  Vital Signs Last 24 Hrs  T(C): 36.9 (25 Nov 2018 09:42), Max: 37.3 (24 Nov 2018 13:20)  T(F): 98.5 (25 Nov 2018 09:42), Max: 99.1 (24 Nov 2018 13:20)  HR: 74 (25 Nov 2018 09:42) (66 - 90)  BP: 126/73 (25 Nov 2018 09:42) (108/59 - 152/80)  BP(mean): --  RR: 14 (25 Nov 2018 09:42) (14 - 16)  SpO2: 99% (25 Nov 2018 09:42) (99% - 100%)    I&O's Summary    24 Nov 2018 07:01  -  25 Nov 2018 07:00  --------------------------------------------------------  IN: 1220 mL / OUT: 0 mL / NET: 1220 mL        Physical Exam:  Gen: NAD, resting comfortably in bed  Resp: No increased WOB on RA    Abd: nontender, mild distension, soft, compressible.  midline staples intact, well-healing incision without erythema/discharge  Extr: WWP distally, cap refill <2s    LABS:                        9.7    6.3   )-----------( 227      ( 25 Nov 2018 06:46 )             27.9     11-25    138  |  102  |  9   ----------------------------<  106<H>  4.1   |  22  |  0.62    Ca    8.6      25 Nov 2018 06:46  Phos  2.9     11-25  Mg     1.9     11-25
Trauma Surgery Progress Note    Subjective:   Pt seen & examined at bedside, no acute events overnight. Pain is well controlled.  No F/C, N/V, CP/SOB.  Passing some flatus but no bowel movements.   Passed TOV yesterday.      Medications:  diltiazem Injectable 5  heparin  Injectable 5000      Objective:  Vital Signs Last 24 Hrs  T(C): 36.8 (21 Nov 2018 08:05), Max: 37.1 (20 Nov 2018 22:26)  T(F): 98.2 (21 Nov 2018 08:05), Max: 98.8 (20 Nov 2018 22:26)  HR: 68 (21 Nov 2018 08:05) (63 - 99)  BP: 176/82 (21 Nov 2018 08:05) (152/68 - 176/82)  BP(mean): --  RR: 18 (21 Nov 2018 08:05) (16 - 18)  SpO2: 100% (21 Nov 2018 08:05) (92% - 100%)    I&O's Summary    20 Nov 2018 07:01  -  21 Nov 2018 07:00  --------------------------------------------------------  IN: 0 mL / OUT: 1475 mL / NET: -1475 mL        Physical Exam:  Gen: NAD, resting comfortably in bed  Resp: No increased WOB on NC  Abd: Midline staples intact, incision intact/well-healing.  Mildly distended, nontender, soft, compressible  Extr: WWP distally, cap refill <2s    LABS:                        10.1   7.6   )-----------( 176      ( 21 Nov 2018 06:42 )             30.4     11-21    142  |  100  |  15  ----------------------------<  123<H>  3.0<L>   |  30  |  0.61    Ca    8.5      21 Nov 2018 06:50  Phos  1.8     11-21  Mg     1.8     11-21
Trauma Surgery Progress Note    Subjective:   Pt seen & examined at bedside, no acute events overnight. Pain is well controlled.  No F/C, N/V, CP/SOB.  Patient denies any subjective issues with eating/swallowing, no current complaints.  Passing flatus/stool.     Medications:  diltiazem Injectable 5  heparin  Injectable 5000      Objective:  Vital Signs Last 24 Hrs  T(C): 37.3 (24 Nov 2018 13:20), Max: 37.3 (23 Nov 2018 21:54)  T(F): 99.1 (24 Nov 2018 13:20), Max: 99.1 (23 Nov 2018 21:54)  HR: 71 (24 Nov 2018 13:20) (63 - 90)  BP: 108/59 (24 Nov 2018 13:20) (108/59 - 163/72)  BP(mean): --  RR: 16 (24 Nov 2018 13:20) (16 - 18)  SpO2: 100% (24 Nov 2018 13:20) (95% - 100%)    I&O's Summary    23 Nov 2018 07:01  -  24 Nov 2018 07:00  --------------------------------------------------------  IN: 1400 mL / OUT: 0 mL / NET: 1400 mL    24 Nov 2018 07:01  -  24 Nov 2018 16:01  --------------------------------------------------------  IN: 360 mL / OUT: 0 mL / NET: 360 mL        Physical Exam:  Gen: NAD, resting comfortably in bed  Resp: No increased WOB.  Mild gurgling sounds when patient talks.    Abd: nontender, mild distension, soft, compressible.  midline staples intact, well-healing incision without erythema/discharge  Extr: WWP distally, cap refill <2s    LABS:                        10.1   4.9   )-----------( 203      ( 24 Nov 2018 07:14 )             29.7     11-24    138  |  103  |  10  ----------------------------<  118<H>  4.0   |  24  |  0.61    Ca    8.4      24 Nov 2018 07:13  Phos  3.3     11-24  Mg     2.1     11-24
[facility] ATP SURGERY DAILY PROGRESS NOTE      SUBJECTIVE:    The patient was seen and examined at bedside this morning.  -  mildly distended  -  no longer gurgling on sips of H2O  -  passing flatus but no bm    OBJECTIVE:    Vital Signs Last 24 Hrs  T(C): 36.7 (22 Nov 2018 13:22), Max: 36.9 (21 Nov 2018 18:02)  T(F): 98 (22 Nov 2018 13:22), Max: 98.4 (21 Nov 2018 18:02)  HR: 62 (22 Nov 2018 13:22) (60 - 89)  BP: 147/71 (22 Nov 2018 13:22) (129/54 - 160/77)  BP(mean): --  RR: 16 (22 Nov 2018 13:22) (16 - 20)  SpO2: 97% (22 Nov 2018 13:22) (97% - 100%)    I&O's Detail    21 Nov 2018 07:01  -  22 Nov 2018 07:00  --------------------------------------------------------  IN:    dextrose 5% + sodium chloride 0.45% with potassium chloride 20 mEq/L: 600 mL    Oral Fluid: 560 mL  Total IN: 1160 mL    OUT:    Voided: 2300 mL  Total OUT: 2300 mL    Total NET: -1140 mL      LABS:                        9.4    6.9   )-----------( 163      ( 22 Nov 2018 06:22 )             27.4     11-22    138  |  101  |  11  ----------------------------<  126<H>  3.9   |  25  |  0.53    Ca    8.1<L>      22 Nov 2018 06:22  Phos  3.2     11-22  Mg     2.0     11-22      EXAM:  Gen: NAD, resting comfortably in bed  Resp: No increased WOB on NC  Abd: Midline staples intact, incision intact/well-healing.  Mildly distended, nontender, soft, compressible  Extr: WWP distally, cap refill <2s

## 2018-11-26 NOTE — DISCHARGE NOTE ADULT - CARE PLAN
Principal Discharge DX:	SBO (small bowel obstruction)  Goal:	relieve obstruction  Assessment and plan of treatment:	underwent operation to release scar tissue at the small bowel

## 2018-11-26 NOTE — DISCHARGE NOTE ADULT - INSTRUCTIONS
Mechanical soft diet, chopped up food, as possible If unable to tolerate diet, nausea, vomiting, fever above 100.3, chills, or an increase in pain, notify provider or return to ER.

## 2018-11-26 NOTE — PROGRESS NOTE ADULT - REASON FOR ADMISSION
Closed-loop SBO

## 2018-11-26 NOTE — DISCHARGE NOTE ADULT - HOSPITAL COURSE
89yo F h/o dementia p/w closed loop SBO on 11/17/18, underwent ex-lap, lysis of internal hernia (11/18) without incident. Post-op she had delayed return of bowel function, but had NG tube removed on ___, was advanced with regard to diet afterwards. On _______ she had difficulty swallowing clears and was heard gurgling; speech therapy was consulted and evaluated her on _____. She failed the study, and they recommended _______, but the patient's HCP decided that they would accept the risks of continuing a regular diet and opted for a GI soft diet. She tolerated the diet without any respiratory complications and was hemodynamically stable, deemed stable for discharge on 11/26. 89yo F h/o dementia p/w closed loop SBO on 11/17/18, underwent ex-lap, lysis of internal hernia (11/18) without incident. Post-op she had delayed return of bowel function, but had NG tube removed on 11/18, zee out 11/20, was advanced with regard to diet afterwards. On 11/21 she had difficulty swallowing clears and was heard gurgling; speech therapy was consulted and evaluated her on 11/23. She failed the study, and they recommended continuing NPO status and modified barium swallow study, but the patient's HCP decided that they would accept the risks of continuing a regular diet and opted for a GI soft diet. She tolerated the diet without any respiratory complications and was hemodynamically stable, deemed stable for discharge on 11/26. 89yo F h/o dementia p/w closed loop SBO on 11/17/18, underwent ex-lap, lysis of internal hernia (11/18) without incident. Post-op she had delayed return of bowel function, but had NG tube removed on 11/18, zee out 11/20, was advanced with regard to diet afterwards. On 11/19 she had a run of AFib with RVR to 190s on telemetry and was started on IV diltiazem. On 11/21 she had difficulty swallowing clears and was heard gurgling; speech therapy was consulted and evaluated her on 11/23. She failed the study, and they recommended continuing NPO status and modified barium swallow study, but the patient's HCP decided that they would accept the risks of continuing a regular diet and opted for a GI soft diet. She tolerated the diet without any respiratory complications and was hemodynamically stable, deemed stable for discharge on 11/26.

## 2018-11-26 NOTE — DISCHARGE NOTE ADULT - MEDICATION SUMMARY - MEDICATIONS TO TAKE
I will START or STAY ON the medications listed below when I get home from the hospital:    Tylenol 325 mg oral tablet  -- 1 tab(s) by mouth every 6 hours, As Needed  -- Indication: For pain    Aspirin Enteric Coated 81 mg oral delayed release tablet  -- 1 tab(s) by mouth once a day  -- Indication: For preventative health    Cozaar 100 mg oral tablet  -- 1 tab(s) by mouth once a day  -- Indication: For blood pressure    Os-Nuno 500 (1250 mg calcium carbonate) oral tablet  -- 2 tab(s) by mouth once a day  -- Indication: For vitamins    gabapentin 300 mg oral capsule  -- 1 cap(s) by mouth 2 times a day  -- Indication: For neurologic pain    mirtazapine 15 mg oral tablet  -- 3 tab(s) by mouth once a day (at bedtime)  -- Indication: For mood    CeleXA 20 mg oral tablet  -- 1 tab(s) by mouth once a day  -- Indication: For mood    ALPRAZolam 0.25 mg oral tablet  -- 1 tab(s) by mouth 2 times a day at 2 pm and 8pm   -- Indication: For anxiety    ALPRAZolam 0.25 mg oral tablet  -- 1 tab(s) by mouth once a day, As needed, anxiety  -- Indication: For anxiety    Ambien 5 mg oral tablet  -- 1 tab(s) by mouth once a day (at bedtime), As Needed for insomnia  -- Indication: For ambien    NIFEdipine 60 mg oral tablet, extended release  -- 1 tab(s) by mouth once a day  -- Indication: For blood pressure    Biotene Mouthwash oral solution  -- orally once a day (at bedtime)  -- Indication: For mouthwash    melatonin 3 mg oral tablet  -- 1 tab(s) by mouth once a day (at bedtime)  -- Indication: For insomnia    PriLOSEC 40 mg oral delayed release capsule  -- 1 cap(s) by mouth once a day  -- Indication: For reflux    B 100 Complex oral tablet  -- 1 tab(s) by mouth once a day  -- Indication: For vitamins    Centrum oral tablet  -- 1 tab(s) by mouth once a day  -- Indication: For vitamins    Vitamin B12 1000 mcg oral tablet  -- 1 tab(s) by mouth once a day  -- Indication: For vitamins    Vitamin D3 400 intl units oral tablet  -- 1 tab(s) by mouth once a day  -- Indication: For vitamins    Vitamin C 500 mg oral tablet  -- 1 tab(s) by mouth once a day  -- Indication: For vitamins I will START or STAY ON the medications listed below when I get home from the hospital:    Tylenol 325 mg oral tablet  -- 1 tab(s) by mouth every 6 hours, As Needed  -- Indication: For pain    Aspirin Enteric Coated 81 mg oral delayed release tablet  -- 1 tab(s) by mouth once a day  -- Indication: For preventative health    Cozaar 100 mg oral tablet  -- 1 tab(s) by mouth once a day  -- Indication: For blood pressure    Os-Nuno 500 (1250 mg calcium carbonate) oral tablet  -- 2 tab(s) by mouth once a day  -- Indication: For vitamins    DilTIAZem Hydrochloride  mg/24 hours oral capsule, extended release  -- 1 cap(s) by mouth once a day  -- Indication: For Atrial Fibrillation    gabapentin 300 mg oral capsule  -- 1 cap(s) by mouth 2 times a day  -- Indication: For neurologic pain    mirtazapine 15 mg oral tablet  -- 3 tab(s) by mouth once a day (at bedtime)  -- Indication: For mood    CeleXA 20 mg oral tablet  -- 1 tab(s) by mouth once a day  -- Indication: For mood    ALPRAZolam 0.25 mg oral tablet  -- 1 tab(s) by mouth 2 times a day at 2 pm and 8pm   -- Indication: For anxiety    ALPRAZolam 0.25 mg oral tablet  -- 1 tab(s) by mouth once a day, As needed, anxiety  -- Indication: For anxiety    Ambien 5 mg oral tablet  -- 1 tab(s) by mouth once a day (at bedtime), As Needed for insomnia  -- Indication: For ambien    NIFEdipine 60 mg oral tablet, extended release  -- 1 tab(s) by mouth once a day  -- Indication: For blood pressure    Biotene Mouthwash oral solution  -- orally once a day (at bedtime)  -- Indication: For mouthwash    melatonin 3 mg oral tablet  -- 1 tab(s) by mouth once a day (at bedtime)  -- Indication: For insomnia    PriLOSEC 40 mg oral delayed release capsule  -- 1 cap(s) by mouth once a day  -- Indication: For reflux    B 100 Complex oral tablet  -- 1 tab(s) by mouth once a day  -- Indication: For vitamins    Centrum oral tablet  -- 1 tab(s) by mouth once a day  -- Indication: For vitamins    Vitamin B12 1000 mcg oral tablet  -- 1 tab(s) by mouth once a day  -- Indication: For vitamins    Vitamin D3 400 intl units oral tablet  -- 1 tab(s) by mouth once a day  -- Indication: For vitamins    Vitamin C 500 mg oral tablet  -- 1 tab(s) by mouth once a day  -- Indication: For vitamins

## 2018-11-26 NOTE — DISCHARGE NOTE ADULT - CARE PROVIDER_API CALL
Hiren Simmons), Surgery; Surgical Critical Care  [address] [id number]  Phone: [phone]  Fax: [phone]

## 2018-11-26 NOTE — DISCHARGE NOTE ADULT - ADDITIONAL INSTRUCTIONS
FOLLOW-UP:  Please call [phone] to schedule a follow-up appointment with your surgeon,  Dr. Simmons in 1-2 weeks.    How do I take care of my incision?  -  Keep the wound completely dry for the first 2 days after the surgery.  -  You may shower and/or sponge bathe after 2 days.  -  Clean the area with warm soapy water, and DO NOT RUB the area.  -  Please do not submerge your wound underwater for 2 weeks (no baths).  -  Do not remove the staples on your own. Your doctor will tell you when to come back to have them removed, usually after 14 days.    When should I call my doctor?  -  If you have increased redness, or purple streaking along the incision.  -  If you have an oral temperature over 100.4 degrees.  -  If you have any yellowish or greenish drainage from the incisions or notice a foul odor.  -  If you have bleeding from the incisions that is difficult to control with light pressure.  -  If you have severe or increased pain that is not controlled by taking the discharge pain medications as prescribed.

## 2018-11-26 NOTE — DISCHARGE NOTE ADULT - PATIENT PORTAL LINK FT
You can access the [facility] Patient Portal, offered by [facility], by registering with the following website: [email or web address]

## 2018-11-26 NOTE — PROGRESS NOTE ADULT - ASSESSMENT
89yo F h/o dementia p/w closed loop SBO, now s/p ex-lap, lysis internal hernia (11/18)    PLAN:  - DNR/DNI status clarified with healthcare proxy, Alexey Lopez  - Regular GI soft diet per healthcare proxy, understands risk of aspiration  - DVT PPX - SQH  - Pain control   - OOB as tolerated with assistance     Dispo: [facility], awaiting placement    [facility]  [id number] 89yo F h/o dementia p/w closed loop SBO, now s/p ex-lap, lysis internal hernia (11/18)    PLAN:  - DNR/DNI status clarified with healthcare proxy, Alexey Lopez  - Regular GI soft diet per healthcare proxy, understands risk of aspiration  - AFib rate control: start po  diltiazem qD upon d/c per Dr. Mason (HOSP)  - DVT PPX - SQH  - Pain control   - OOB as tolerated with assistance     Dispo: [facility], awaiting placement    [facility]  [id number]

## 2019-01-07 NOTE — PROVIDER CONTACT NOTE (OTHER) - SITUATION
-- Pt reports taking 15 units insulin long acting daily  -- Medication list does not reflect this but does show Tradjenta po 5mg daily  Plan  -- Will start on LDSSI and reassess as pt currently with poor po intake.  -- Well-controlled at this time.    
PATs to 161 for 4 seconds
4.8 seconds A.fib up to 190's on remote tele monitoring
PAT up to 150 lasting 3sec
PATs to 183 for 8 seconds
Pt hypertensive
Pt is hypertensive
pt have trouble with clear fluid, concern patient may aspirate
pt on tele, per tele tech patient went from PAT to flutter into 150
pt pulled out NG tube
4.1 seconds of PAT to 130s

## 2019-02-09 ENCOUNTER — EMERGENCY (EMERGENCY)
Facility: HOSPITAL | Age: 84
LOS: 0 days | Discharge: HOME | End: 2019-02-09
Attending: STUDENT IN AN ORGANIZED HEALTH CARE EDUCATION/TRAINING PROGRAM
Payer: MEDICARE

## 2019-02-09 VITALS
WEIGHT: 100.09 LBS | RESPIRATION RATE: 17 BRPM | DIASTOLIC BLOOD PRESSURE: 57 MMHG | HEART RATE: 62 BPM | HEIGHT: 61 IN | SYSTOLIC BLOOD PRESSURE: 113 MMHG | TEMPERATURE: 98 F | OXYGEN SATURATION: 92 %

## 2019-02-09 VITALS
RESPIRATION RATE: 18 BRPM | SYSTOLIC BLOOD PRESSURE: 157 MMHG | TEMPERATURE: 97 F | OXYGEN SATURATION: 98 % | HEART RATE: 73 BPM | DIASTOLIC BLOOD PRESSURE: 92 MMHG

## 2019-02-09 DIAGNOSIS — R00.2 PALPITATIONS: ICD-10-CM

## 2019-02-09 DIAGNOSIS — K21.9 GASTRO-ESOPHAGEAL REFLUX DISEASE WITHOUT ESOPHAGITIS: ICD-10-CM

## 2019-02-09 DIAGNOSIS — I48.0 PAROXYSMAL ATRIAL FIBRILLATION: ICD-10-CM

## 2019-02-09 DIAGNOSIS — K59.00 CONSTIPATION, UNSPECIFIED: ICD-10-CM

## 2019-02-09 DIAGNOSIS — R53.1 WEAKNESS: ICD-10-CM

## 2019-02-09 DIAGNOSIS — F41.9 ANXIETY DISORDER, UNSPECIFIED: ICD-10-CM

## 2019-02-09 DIAGNOSIS — Z90.710 ACQUIRED ABSENCE OF BOTH CERVIX AND UTERUS: Chronic | ICD-10-CM

## 2019-02-09 DIAGNOSIS — I10 ESSENTIAL (PRIMARY) HYPERTENSION: ICD-10-CM

## 2019-02-09 LAB
ALBUMIN SERPL ELPH-MCNC: 3.8 G/DL — SIGNIFICANT CHANGE UP (ref 3.3–5)
ALP SERPL-CCNC: 100 U/L — SIGNIFICANT CHANGE UP (ref 40–120)
ALT FLD-CCNC: 58 U/L — SIGNIFICANT CHANGE UP (ref 12–78)
ANION GAP SERPL CALC-SCNC: 14 MMOL/L — SIGNIFICANT CHANGE UP (ref 5–17)
APPEARANCE UR: CLEAR — SIGNIFICANT CHANGE UP
APTT BLD: 30.1 SEC — SIGNIFICANT CHANGE UP (ref 27.5–36.3)
AST SERPL-CCNC: 24 U/L — SIGNIFICANT CHANGE UP (ref 15–37)
BACTERIA # UR AUTO: ABNORMAL
BASOPHILS # BLD AUTO: 0.04 K/UL — SIGNIFICANT CHANGE UP (ref 0–0.2)
BASOPHILS NFR BLD AUTO: 0.6 % — SIGNIFICANT CHANGE UP (ref 0–2)
BILIRUB SERPL-MCNC: 0.3 MG/DL — SIGNIFICANT CHANGE UP (ref 0.2–1.2)
BILIRUB UR-MCNC: NEGATIVE — SIGNIFICANT CHANGE UP
BLD GP AB SCN SERPL QL: SIGNIFICANT CHANGE UP
BUN SERPL-MCNC: 16 MG/DL — SIGNIFICANT CHANGE UP (ref 7–23)
CALCIUM SERPL-MCNC: 9.1 MG/DL — SIGNIFICANT CHANGE UP (ref 8.5–10.1)
CHLORIDE SERPL-SCNC: 95 MMOL/L — LOW (ref 96–108)
CO2 SERPL-SCNC: 27 MMOL/L — SIGNIFICANT CHANGE UP (ref 22–31)
COLOR SPEC: YELLOW — SIGNIFICANT CHANGE UP
CREAT SERPL-MCNC: 0.71 MG/DL — SIGNIFICANT CHANGE UP (ref 0.5–1.3)
DIFF PNL FLD: NEGATIVE — SIGNIFICANT CHANGE UP
EOSINOPHIL # BLD AUTO: 0.13 K/UL — SIGNIFICANT CHANGE UP (ref 0–0.5)
EOSINOPHIL NFR BLD AUTO: 1.8 % — SIGNIFICANT CHANGE UP (ref 0–6)
EPI CELLS # UR: SIGNIFICANT CHANGE UP
GLUCOSE SERPL-MCNC: 106 MG/DL — HIGH (ref 70–99)
GLUCOSE UR QL: NEGATIVE MG/DL — SIGNIFICANT CHANGE UP
HCT VFR BLD CALC: 33.9 % — LOW (ref 34.5–45)
HGB BLD-MCNC: 11.1 G/DL — LOW (ref 11.5–15.5)
IMM GRANULOCYTES NFR BLD AUTO: 0.4 % — SIGNIFICANT CHANGE UP (ref 0–1.5)
INR BLD: 0.98 RATIO — SIGNIFICANT CHANGE UP (ref 0.88–1.16)
KETONES UR-MCNC: NEGATIVE — SIGNIFICANT CHANGE UP
LEUKOCYTE ESTERASE UR-ACNC: ABNORMAL
LYMPHOCYTES # BLD AUTO: 1.03 K/UL — SIGNIFICANT CHANGE UP (ref 1–3.3)
LYMPHOCYTES # BLD AUTO: 14.2 % — SIGNIFICANT CHANGE UP (ref 13–44)
MCHC RBC-ENTMCNC: 30.9 PG — SIGNIFICANT CHANGE UP (ref 27–34)
MCHC RBC-ENTMCNC: 32.7 GM/DL — SIGNIFICANT CHANGE UP (ref 32–36)
MCV RBC AUTO: 94.4 FL — SIGNIFICANT CHANGE UP (ref 80–100)
MONOCYTES # BLD AUTO: 0.7 K/UL — SIGNIFICANT CHANGE UP (ref 0–0.9)
MONOCYTES NFR BLD AUTO: 9.7 % — SIGNIFICANT CHANGE UP (ref 2–14)
NEUTROPHILS # BLD AUTO: 5.3 K/UL — SIGNIFICANT CHANGE UP (ref 1.8–7.4)
NEUTROPHILS NFR BLD AUTO: 73.3 % — SIGNIFICANT CHANGE UP (ref 43–77)
NITRITE UR-MCNC: NEGATIVE — SIGNIFICANT CHANGE UP
NRBC # BLD: 0 /100 WBCS — SIGNIFICANT CHANGE UP (ref 0–0)
OB PNL STL: NEGATIVE — SIGNIFICANT CHANGE UP
PH UR: 7 — SIGNIFICANT CHANGE UP (ref 5–8)
PLATELET # BLD AUTO: 275 K/UL — SIGNIFICANT CHANGE UP (ref 150–400)
POTASSIUM SERPL-MCNC: 4.2 MMOL/L — SIGNIFICANT CHANGE UP (ref 3.5–5.3)
POTASSIUM SERPL-SCNC: 4.2 MMOL/L — SIGNIFICANT CHANGE UP (ref 3.5–5.3)
PROT SERPL-MCNC: 7.8 GM/DL — SIGNIFICANT CHANGE UP (ref 6–8.3)
PROT UR-MCNC: NEGATIVE MG/DL — SIGNIFICANT CHANGE UP
PROTHROM AB SERPL-ACNC: 11 SEC — SIGNIFICANT CHANGE UP (ref 10–12.9)
RBC # BLD: 3.59 M/UL — LOW (ref 3.8–5.2)
RBC # FLD: 14.5 % — SIGNIFICANT CHANGE UP (ref 10.3–14.5)
SODIUM SERPL-SCNC: 136 MMOL/L — SIGNIFICANT CHANGE UP (ref 135–145)
SP GR SPEC: 1.01 — SIGNIFICANT CHANGE UP (ref 1.01–1.02)
UROBILINOGEN FLD QL: NEGATIVE MG/DL — SIGNIFICANT CHANGE UP
WBC # BLD: 7.23 K/UL — SIGNIFICANT CHANGE UP (ref 3.8–10.5)
WBC # FLD AUTO: 7.23 K/UL — SIGNIFICANT CHANGE UP (ref 3.8–10.5)
WBC UR QL: SIGNIFICANT CHANGE UP

## 2019-02-09 PROCEDURE — 99284 EMERGENCY DEPT VISIT MOD MDM: CPT

## 2019-02-09 NOTE — ED ADULT TRIAGE NOTE - CHIEF COMPLAINT QUOTE
as per ems " brought from [place] complaining of weakness in legs and dark stool for the past 4 days." hx of bowel obstruction.

## 2019-02-09 NOTE — ED ADULT NURSE NOTE - EXTENSIONS OF SELF_ADULT
Pt reports being at [facility] in mid September, pt states he left [facility]. Pt also states he stopped taking Prozac and Buspar upon leaving [facility]     Monika Valentino RN  10/10/17 [id number]    
Eyeglasses

## 2019-02-09 NOTE — ED ADULT NURSE NOTE - NSIMPLEMENTINTERV_GEN_ALL_ED
Implemented All Fall Risk Interventions:  [place] to call system. Call bell, personal items and telephone within reach. Instruct patient to call for assistance. Room bathroom lighting operational. Non-slip footwear when patient is off stretcher. Physically safe environment: no spills, clutter or unnecessary equipment. Stretcher in lowest position, wheels locked, appropriate side rails in place. Provide visual cue, wrist band, yellow gown, etc. Monitor gait and stability. Monitor for mental status changes and reorient to person, place, and time. Review medications for side effects contributing to fall risk. Reinforce activity limits and safety measures with patient and family.

## 2019-02-09 NOTE — ED PROVIDER NOTE - OBJECTIVE STATEMENT
89 year old female presents today sent from the [facility] for evaluation, pt c/o having four days of dark stools and generalized weakness, as per her nephew she appeared to get nervous and c/o palpitations

## 2019-02-09 NOTE — ED ADULT NURSE NOTE - CHIEF COMPLAINT QUOTE
as per ems " brought from [place] complaining of weakness in legs and dark stool for the past 4 days." hx of bowel obstruction.

## 2019-02-09 NOTE — ED PROVIDER NOTE - PROGRESS NOTE DETAILS
pt's nephew, Brendon Lopez called in, states that the patient normally goes to [facility] and would like her to go there, he was told that an physician would have to accept, he will call back once he gets an accepting physician Dr mathews contacted [phone], he states that he did see the patient at the facility yesterday and told her to go to the ER if her symptoms continue, he was updated on her results today, he wants to continue his current orders for her at the [facility] which is to HOLD aspirin and vitamin C, this was relayed to the patient and her nephew who is at the bedside

## 2019-02-09 NOTE — ED PROVIDER NOTE - PMH
Anxiety    Constipation    GERD (gastroesophageal reflux disease)    HTN (hypertension)    Paroxysmal atrial fibrillation

## 2019-02-09 NOTE — ED PROVIDER NOTE - MEDICAL DECISION MAKING DETAILS
pt was sent in from the [facility] for evaluation for black stools for the last four days, pt's stool on exam is brown, guaiac is negative, hgb is 11, her pmd dr mathews contacted and has held her aspirin and vitamin c and increased her dose of protonix

## 2019-02-09 NOTE — ED ADULT NURSE NOTE - OBJECTIVE STATEMENT
received pt c/o black stool x4 days denies any abdominal pain, pt c/o generalized weakness, ambulate with walker at home denies any recent fall also c/o decrease appetite

## 2019-02-09 NOTE — ED ADULT NURSE REASSESSMENT NOTE - NS ED NURSE REASSESS COMMENT FT1
Report given to oncoming RN , pts history, current condition and reason for admission discussed, safety concerns addressed and reviewed, pt currently in stable condition, IV patent and running, dressing is clean dry and intact, pt aware of plan of care. Pt education deemed successful after successful patient teach back proves proficiency.

## 2019-02-12 NOTE — ED POST DISCHARGE NOTE - DETAILS
called [facility], spoke with Chante, results faxed over, pcp Dr esteban will see pt this week. jax Blackburn aware as well

## 2019-05-02 NOTE — BEHAVIORAL HEALTH ASSESSMENT NOTE - EMPLOYMENT

## 2019-09-27 NOTE — ED ADULT TRIAGE NOTE - PRO INTERPRETER NEED 2
Patient:   TIMOTEO HAWTHORNE            MRN: [id number]            FIN: [id number]               Age:   2 days     Sex:  MALE     :  17   Associated Diagnoses:   [place], single birth   Author:   TARAH HERNANDEZ      Review of Systems   Not applicable:  Patient is .       Histories    information     Full term.     Normal vaginal delivery.     Gestational Age by Dates: 38  weeks.        Physical Examination   VS/Measurements        Vitals between:   2017 08:58:07   TO   2017 08:58:07                   LAST RESULT MINIMUM MAXIMUM  Temperature 36.7 36.7 36.9  Heart Rate 118 118 134  Respiratory Rate 54 48 60     General:  No acute distress, Alert, Responsive, In open crib.    Eye:  Pupils are equal, round and reactive to light, Normal conjunctiva.         Red reflex: Bilaterally, Present, Symmetrical.    HENT:  Normocephalic, Nares patent, Anterior fontanelle open/soft/flat, Ears normally set and rotated, Palate intact.    Neck:  Supple, No lymphadenopathy, Full range of motion, Clavicles intact.    Respiratory:  Lungs are clear to auscultation, Respirations are non-labored, Breath sounds are equal, Symmetrical chest wall expansion.    Cardiovascular:  Normal rate, Regular rhythm, No murmur, Good pulses equal in all extremities, Normal peripheral perfusion, No edema.    Gastrointestinal:  Soft, Non-tender, Non-distended, Normal bowel sounds, No organomegaly, 3 vessel umbilical cord, Anus patent.    Genitourinary:  Normal genitalia for age and sex, No scrotal tenderness, No inguinal tenderness, Testes descended bilaterally, No lesions.    Musculoskeletal     Normal range of motion.     Normal strength.     No deformity.     No hip clicks.     Normal Morse's.     Normal Ortolani's.     Upper extremity exam: Upper extremity exam is within normal limits.     Spine/torso exam: spine/torso exam is within normal limits.     Lower extremity exam: Lower extremity exam is within normal  limits.     Integumentary:  Warm, Pink, Intact.    Neurologic:  Alert, Normal sensory, Normal motor function, Moves all extremities appropriately, Susan, rooting, sucking reflexes are normal, No focal deficits, Gag reflex normal, Hand grasp present, Toe grasp present, Plantar reflex.       Impression and Plan   Diagnosis     [place], single birth ([id number] Z38.00, Diagnosis, Medical).     Condition:  Stable.    Plan     Breast feeding on demand.     supplement with formula as needed.     Education and Follow-up:       Discharge Planning: Plan to discharge ( To home ), follow up in 3-4 days in office.             Electronically Signed On 2017 08:58  __________________________________________________   TARAH HERNANDEZ     English

## 2020-05-25 ENCOUNTER — EMERGENCY (EMERGENCY)
Facility: HOSPITAL | Age: 85
LOS: 1 days | Discharge: HOME | End: 2020-05-25
Attending: EMERGENCY MEDICINE
Payer: MEDICARE

## 2020-05-25 VITALS
HEIGHT: 62 IN | SYSTOLIC BLOOD PRESSURE: 128 MMHG | RESPIRATION RATE: 18 BRPM | WEIGHT: 121.92 LBS | DIASTOLIC BLOOD PRESSURE: 75 MMHG | TEMPERATURE: 99 F | HEART RATE: 58 BPM | OXYGEN SATURATION: 96 %

## 2020-05-25 DIAGNOSIS — Z90.710 ACQUIRED ABSENCE OF BOTH CERVIX AND UTERUS: Chronic | ICD-10-CM

## 2020-05-25 LAB
ALBUMIN SERPL ELPH-MCNC: 4.2 G/DL — SIGNIFICANT CHANGE UP (ref 3.3–5)
ALP SERPL-CCNC: 70 U/L — SIGNIFICANT CHANGE UP (ref 40–120)
ALT FLD-CCNC: 10 U/L — SIGNIFICANT CHANGE UP (ref 10–45)
ANION GAP SERPL CALC-SCNC: 14 MMOL/L — SIGNIFICANT CHANGE UP (ref 5–17)
APPEARANCE UR: CLEAR — SIGNIFICANT CHANGE UP
AST SERPL-CCNC: 13 U/L — SIGNIFICANT CHANGE UP (ref 10–40)
BACTERIA # UR AUTO: NEGATIVE — SIGNIFICANT CHANGE UP
BASOPHILS # BLD AUTO: 0.04 K/UL — SIGNIFICANT CHANGE UP (ref 0–0.2)
BASOPHILS NFR BLD AUTO: 0.5 % — SIGNIFICANT CHANGE UP (ref 0–2)
BILIRUB SERPL-MCNC: 0.3 MG/DL — SIGNIFICANT CHANGE UP (ref 0.2–1.2)
BILIRUB UR-MCNC: NEGATIVE — SIGNIFICANT CHANGE UP
BUN SERPL-MCNC: 16 MG/DL — SIGNIFICANT CHANGE UP (ref 7–23)
CALCIUM SERPL-MCNC: 9.3 MG/DL — SIGNIFICANT CHANGE UP (ref 8.4–10.5)
CHLORIDE SERPL-SCNC: 92 MMOL/L — LOW (ref 96–108)
CO2 SERPL-SCNC: 23 MMOL/L — SIGNIFICANT CHANGE UP (ref 22–31)
COLOR SPEC: YELLOW — SIGNIFICANT CHANGE UP
CREAT SERPL-MCNC: 0.76 MG/DL — SIGNIFICANT CHANGE UP (ref 0.5–1.3)
DIFF PNL FLD: NEGATIVE — SIGNIFICANT CHANGE UP
EOSINOPHIL # BLD AUTO: 0.12 K/UL — SIGNIFICANT CHANGE UP (ref 0–0.5)
EOSINOPHIL NFR BLD AUTO: 1.4 % — SIGNIFICANT CHANGE UP (ref 0–6)
EPI CELLS # UR: 0 /HPF — SIGNIFICANT CHANGE UP
GLUCOSE SERPL-MCNC: 119 MG/DL — HIGH (ref 70–99)
GLUCOSE UR QL: NEGATIVE — SIGNIFICANT CHANGE UP
HCT VFR BLD CALC: 29.4 % — LOW (ref 34.5–45)
HGB BLD-MCNC: 9.7 G/DL — LOW (ref 11.5–15.5)
HYALINE CASTS # UR AUTO: 1 /LPF — SIGNIFICANT CHANGE UP (ref 0–2)
IMM GRANULOCYTES NFR BLD AUTO: 0.3 % — SIGNIFICANT CHANGE UP (ref 0–1.5)
KETONES UR-MCNC: NEGATIVE — SIGNIFICANT CHANGE UP
LEUKOCYTE ESTERASE UR-ACNC: NEGATIVE — SIGNIFICANT CHANGE UP
LYMPHOCYTES # BLD AUTO: 1.17 K/UL — SIGNIFICANT CHANGE UP (ref 1–3.3)
LYMPHOCYTES # BLD AUTO: 13.2 % — SIGNIFICANT CHANGE UP (ref 13–44)
MCHC RBC-ENTMCNC: 30.6 PG — SIGNIFICANT CHANGE UP (ref 27–34)
MCHC RBC-ENTMCNC: 33 GM/DL — SIGNIFICANT CHANGE UP (ref 32–36)
MCV RBC AUTO: 92.7 FL — SIGNIFICANT CHANGE UP (ref 80–100)
MONOCYTES # BLD AUTO: 0.68 K/UL — SIGNIFICANT CHANGE UP (ref 0–0.9)
MONOCYTES NFR BLD AUTO: 7.7 % — SIGNIFICANT CHANGE UP (ref 2–14)
NEUTROPHILS # BLD AUTO: 6.8 K/UL — SIGNIFICANT CHANGE UP (ref 1.8–7.4)
NEUTROPHILS NFR BLD AUTO: 76.9 % — SIGNIFICANT CHANGE UP (ref 43–77)
NITRITE UR-MCNC: NEGATIVE — SIGNIFICANT CHANGE UP
NRBC # BLD: 0 /100 WBCS — SIGNIFICANT CHANGE UP (ref 0–0)
PH UR: 6.5 — SIGNIFICANT CHANGE UP (ref 5–8)
PLATELET # BLD AUTO: 252 K/UL — SIGNIFICANT CHANGE UP (ref 150–400)
POTASSIUM SERPL-MCNC: 4.3 MMOL/L — SIGNIFICANT CHANGE UP (ref 3.5–5.3)
POTASSIUM SERPL-SCNC: 4.3 MMOL/L — SIGNIFICANT CHANGE UP (ref 3.5–5.3)
PROT SERPL-MCNC: 7.2 G/DL — SIGNIFICANT CHANGE UP (ref 6–8.3)
PROT UR-MCNC: SIGNIFICANT CHANGE UP
RBC # BLD: 3.17 M/UL — LOW (ref 3.8–5.2)
RBC # FLD: 13.2 % — SIGNIFICANT CHANGE UP (ref 10.3–14.5)
RBC CASTS # UR COMP ASSIST: 5 /HPF — HIGH (ref 0–4)
SODIUM SERPL-SCNC: 129 MMOL/L — LOW (ref 135–145)
SP GR SPEC: 1.01 — SIGNIFICANT CHANGE UP (ref 1.01–1.02)
UROBILINOGEN FLD QL: NEGATIVE — SIGNIFICANT CHANGE UP
WBC # BLD: 8.84 K/UL — SIGNIFICANT CHANGE UP (ref 3.8–10.5)
WBC # FLD AUTO: 8.84 K/UL — SIGNIFICANT CHANGE UP (ref 3.8–10.5)
WBC UR QL: 0 /HPF — SIGNIFICANT CHANGE UP (ref 0–5)

## 2020-05-25 PROCEDURE — 71045 X-RAY EXAM CHEST 1 VIEW: CPT | Mod: 26

## 2020-05-25 PROCEDURE — 99284 EMERGENCY DEPT VISIT MOD MDM: CPT | Mod: GC

## 2020-05-25 RX ORDER — SODIUM CHLORIDE 9 MG/ML
1000 INJECTION INTRAMUSCULAR; INTRAVENOUS; SUBCUTANEOUS ONCE
Refills: 0 | Status: COMPLETED | OUTPATIENT
Start: 2020-05-25 | End: 2020-05-25

## 2020-05-25 RX ADMIN — SODIUM CHLORIDE 1000 MILLILITER(S): 9 INJECTION INTRAMUSCULAR; INTRAVENOUS; SUBCUTANEOUS at 23:07

## 2020-05-25 NOTE — ED PROVIDER NOTE - PATIENT PORTAL LINK FT
You can access the FollowMyHealth Patient Portal offered by [facility] by registering at the following website: [email or web address]. By joining [employer]’s FollowMyHealth portal, you will also be able to view your health information using other applications (apps) compatible with our system.

## 2020-05-25 NOTE — ED ADULT NURSE NOTE - OBJECTIVE STATEMENT
89 y F arrived to the ED BIBEMS from [facility] c/o AMS. As per EMS pt has had increasing AMS today but is A&Ox3 at present. Pt family member noticed an increase in confusion today. Upon arrival pt A&Ox3, KEARA. Md at bedside. Pt reports " my ambien hasn't been working the past few nights." Pt denies chest pain, SOB, HA, N/V/D, abdominal pain, fever/chills, urinary symptoms, hematuria, weakness, dizziness, numbness, tinging. Peripheral pulses present b/l. Skin warm, dry and pink. Pt placed in position of comfort. Pt educated on call bell system and provided call bell. Bed in lowest position, wheels locked, appropriate side rails raised. Pt denies needs at this time.

## 2020-05-25 NOTE — ED PROVIDER NOTE - ATTENDING CONTRIBUTION TO CARE
Tamiko Aguirre MD - Attending Physician: I have personally seen and examined this patient with the resident/fellow.  I have fully participated in the care of this patient. I have reviewed all pertinent clinical information, including history, physical exam, plan and the Resident/Fellow’s note and agree except as noted. See MDM

## 2020-05-25 NOTE — ED PROVIDER NOTE - PHYSICAL EXAMINATION
General: well-appearing elderly woman in no acute distress  Head: normocephalic, atraumatic  Eyes: PERRL, EOMI  Mouth: moist mucous membranes, no oropharyngeal erythema  Neck: supple neck  CV: normal rate and rhythm, bilateral LE pitting edema but no tenderness to palpation, peripheral pulses 2+ bilaterally  Respiratory: clear to auscultation bilaterally  Abdomen: soft, nontender, nondistended, well healed midline scar  : no suprapubic tenderness, no CVAT  MSK: no Cspine tenderness to palpation, no midline spinal tenderness to palpation, no tenderness to palpation of joints  Neuro: alert and oriented x3, CN III-XII intact, speech clear, strength 5/5 UE and LE bilaterally, sensation equal and intact bilaterally  Skin: no rash

## 2020-05-25 NOTE — ED PROVIDER NOTE - CLINICAL SUMMARY MEDICAL DECISION MAKING FREE TEXT BOX
Tamiko Aguirre MD - Attending Physician: Pt here with "not acting herself." Today. Less active than usual. Here alert and oriented, fully conversant, no complaints. Nonfocal exam. Labs, Ua for eval

## 2020-05-25 NOTE — ED ADULT NURSE NOTE - PMH
Anxiety    Anxiety    Constipation    Depression    GERD (gastroesophageal reflux disease)    GERD (gastroesophageal reflux disease)    HTN (hypertension)    Hypertension    Insomnia    Osteopenia    Pacemaker    Paroxysmal atrial fibrillation    Spastic bladder    Vitamin B12 deficiency

## 2020-05-25 NOTE — ED PROVIDER NOTE - NSFOLLOWUPINSTRUCTIONS_ED_ALL_ED_FT
You were seen an evaluated in the emergency room for altered mental status and were found to have mildly low sodium levels.    We have attached the results of the tests from today are attached.    Please follow up with your primary care provider in the next few days.    Continue all home medications as prescribed.    Seek immediate medical attention for any worsening, new, or concerning symptoms.    Please read all attached.

## 2020-05-25 NOTE — ED ADULT NURSE NOTE - NSIMPLEMENTINTERV_GEN_ALL_ED
Implemented All Fall with Harm Risk Interventions:  [place] to call system. Call bell, personal items and telephone within reach. Instruct patient to call for assistance. Room bathroom lighting operational. Non-slip footwear when patient is off stretcher. Physically safe environment: no spills, clutter or unnecessary equipment. Stretcher in lowest position, wheels locked, appropriate side rails in place. Provide visual cue, wrist band, yellow gown, etc. Monitor gait and stability. Monitor for mental status changes and reorient to person, place, and time. Review medications for side effects contributing to fall risk. Reinforce activity limits and safety measures with patient and family. Provide visual clues: red socks.

## 2020-05-25 NOTE — ED PROVIDER NOTE - PROGRESS NOTE DETAILS
Ofelia Prieto, resident MD: spoke with staff at [facility] who state that pt was not acting as herself and perseverating on medications today and noted that she was not walking as much as usual. She normally ambulates with a walker. No recent fevers or any other symptoms noted. Ofelia Prieto, resident MD: spoke with staff at [facility] and discussed plan to discharge as covid is negative. pt is sleeping comfortably at this time. will arrange for nonemergent transportation back to [facility].

## 2020-05-25 NOTE — ED PROVIDER NOTE - OBJECTIVE STATEMENT
89yo woman PMH HTN, GERD presents was sent from [facility] at [facility] for altered mental status. Pt denies any complaints and states that she didn't want to come to the hospital but was sent because she felt her medications for insomnia have not been working well enough the past few days. She denies any fever, cough, n/v, dysuria, abdominal pain. No headache, no focal weakness in arms or legs, no change in vision. No chest pain or SOB. She states she has been having nonbloody loose stool the past week.

## 2020-05-25 NOTE — ED PROVIDER NOTE - NS ED ROS FT
General: no fever  Head: no headache  Eyes: no vision change  ENT: no nasal discharge/congestion, no sore throat  CV: no chest pain  Resp: no SOB, no cough  GI: +diarrhea, no N/V, no abdominal pain  : no dysuria  MSK: no joint pain  Skin: no new rash  Neuro: no focal weakness, no change in sensation

## 2020-05-25 NOTE — ED ADULT NURSE REASSESSMENT NOTE - NS ED NURSE REASSESS COMMENT FT1
Straight urinary catheter inserted using sterile technique. Procedure, risks, and benefits of catheter explained to patient, patient verbalized understanding. Second RN present to confirm sterility. Pt tolerated well. Urinary catheter drained 500 mL  clear yellow urine, no clots visualized. Urinalysis and urine cultures obtained. Catheter removed promptly.

## 2020-05-26 VITALS
SYSTOLIC BLOOD PRESSURE: 151 MMHG | DIASTOLIC BLOOD PRESSURE: 73 MMHG | HEART RATE: 86 BPM | TEMPERATURE: 98 F | RESPIRATION RATE: 18 BRPM | OXYGEN SATURATION: 98 %

## 2020-05-26 LAB
ANION GAP SERPL CALC-SCNC: 11 MMOL/L — SIGNIFICANT CHANGE UP (ref 5–17)
BUN SERPL-MCNC: 14 MG/DL — SIGNIFICANT CHANGE UP (ref 7–23)
CALCIUM SERPL-MCNC: 8.9 MG/DL — SIGNIFICANT CHANGE UP (ref 8.4–10.5)
CHLORIDE SERPL-SCNC: 98 MMOL/L — SIGNIFICANT CHANGE UP (ref 96–108)
CO2 SERPL-SCNC: 23 MMOL/L — SIGNIFICANT CHANGE UP (ref 22–31)
CREAT SERPL-MCNC: 0.7 MG/DL — SIGNIFICANT CHANGE UP (ref 0.5–1.3)
CULTURE RESULTS: NO GROWTH — SIGNIFICANT CHANGE UP
GLUCOSE SERPL-MCNC: 102 MG/DL — HIGH (ref 70–99)
POTASSIUM SERPL-MCNC: 3.8 MMOL/L — SIGNIFICANT CHANGE UP (ref 3.5–5.3)
POTASSIUM SERPL-SCNC: 3.8 MMOL/L — SIGNIFICANT CHANGE UP (ref 3.5–5.3)
SARS-COV-2 RNA SPEC QL NAA+PROBE: SIGNIFICANT CHANGE UP
SODIUM SERPL-SCNC: 132 MMOL/L — LOW (ref 135–145)
SPECIMEN SOURCE: SIGNIFICANT CHANGE UP

## 2020-05-26 PROCEDURE — 99284 EMERGENCY DEPT VISIT MOD MDM: CPT | Mod: 25

## 2020-05-26 PROCEDURE — 81001 URINALYSIS AUTO W/SCOPE: CPT

## 2020-05-26 PROCEDURE — 85027 COMPLETE CBC AUTOMATED: CPT

## 2020-05-26 PROCEDURE — 71045 X-RAY EXAM CHEST 1 VIEW: CPT

## 2020-05-26 PROCEDURE — 87086 URINE CULTURE/COLONY COUNT: CPT

## 2020-05-26 PROCEDURE — 96360 HYDRATION IV INFUSION INIT: CPT | Mod: XU

## 2020-05-26 PROCEDURE — 80048 BASIC METABOLIC PNL TOTAL CA: CPT

## 2020-05-26 PROCEDURE — 51701 INSERT BLADDER CATHETER: CPT

## 2020-05-26 PROCEDURE — 80053 COMPREHEN METABOLIC PANEL: CPT

## 2020-05-26 RX ORDER — ZOLPIDEM TARTRATE 10 MG/1
5 TABLET ORAL ONCE
Refills: 0 | Status: DISCONTINUED | OUTPATIENT
Start: 2020-05-26 | End: 2020-05-26

## 2020-05-26 RX ADMIN — ZOLPIDEM TARTRATE 5 MILLIGRAM(S): 10 TABLET ORAL at 00:16

## 2020-05-26 RX ADMIN — SODIUM CHLORIDE 1000 MILLILITER(S): 9 INJECTION INTRAMUSCULAR; INTRAVENOUS; SUBCUTANEOUS at 00:17

## 2020-05-26 NOTE — ED ADULT NURSE REASSESSMENT NOTE - NS ED NURSE REASSESS COMMENT FT1
Pt A&Ox3 at present, resting comfortably. Pt denies chest pain, SOB, HA, N/V/D, abdominal pain, fever/chills, urinary symptoms, hematuria, weakness, dizziness, numbness, tinging. Peripheral pulses present b/l. Skin warm, dry and pink. Pt placed in position of comfort. Pt educated on call bell system and provided call bell. Bed in lowest position, wheels locked, appropriate side rails raised. Pt denies needs at this time.

## 2020-06-06 ENCOUNTER — EMERGENCY (EMERGENCY)
Facility: HOSPITAL | Age: 85
LOS: 1 days | Discharge: HOME | End: 2020-06-06
Attending: EMERGENCY MEDICINE
Payer: MEDICARE

## 2020-06-06 VITALS
RESPIRATION RATE: 18 BRPM | OXYGEN SATURATION: 94 % | HEART RATE: 60 BPM | TEMPERATURE: 98 F | SYSTOLIC BLOOD PRESSURE: 126 MMHG | DIASTOLIC BLOOD PRESSURE: 73 MMHG

## 2020-06-06 DIAGNOSIS — Z90.710 ACQUIRED ABSENCE OF BOTH CERVIX AND UTERUS: Chronic | ICD-10-CM

## 2020-06-06 PROCEDURE — 72170 X-RAY EXAM OF PELVIS: CPT

## 2020-06-06 PROCEDURE — 90715 TDAP VACCINE 7 YRS/> IM: CPT

## 2020-06-06 PROCEDURE — 99284 EMERGENCY DEPT VISIT MOD MDM: CPT | Mod: GC

## 2020-06-06 PROCEDURE — 90471 IMMUNIZATION ADMIN: CPT

## 2020-06-06 PROCEDURE — 71045 X-RAY EXAM CHEST 1 VIEW: CPT

## 2020-06-06 PROCEDURE — 72125 CT NECK SPINE W/O DYE: CPT | Mod: 26

## 2020-06-06 PROCEDURE — 72125 CT NECK SPINE W/O DYE: CPT

## 2020-06-06 PROCEDURE — 70450 CT HEAD/BRAIN W/O DYE: CPT

## 2020-06-06 PROCEDURE — 99284 EMERGENCY DEPT VISIT MOD MDM: CPT | Mod: 25

## 2020-06-06 RX ORDER — TETANUS TOXOID, REDUCED DIPHTHERIA TOXOID AND ACELLULAR PERTUSSIS VACCINE, ADSORBED 5; 2.5; 8; 8; 2.5 [IU]/.5ML; [IU]/.5ML; UG/.5ML; UG/.5ML; UG/.5ML
0.5 SUSPENSION INTRAMUSCULAR ONCE
Refills: 0 | Status: COMPLETED | OUTPATIENT
Start: 2020-06-06 | End: 2020-06-06

## 2020-06-06 RX ADMIN — TETANUS TOXOID, REDUCED DIPHTHERIA TOXOID AND ACELLULAR PERTUSSIS VACCINE, ADSORBED 0.5 MILLILITER(S): 5; 2.5; 8; 8; 2.5 SUSPENSION INTRAMUSCULAR at 23:35

## 2020-06-06 NOTE — ED ADULT NURSE NOTE - PMH
Has appointment tomorrow for EMG and he is not certain that it is what he really needs at this point.  The tingling is slightly better in his legs, but he really thinks it is a back problem.  He has never an MRI.  Does Dr. Mcgee think that he needs to have the EMG or should he cancel it and get an MRI instead?    Printed for MD review.  
Patient would like a call back in regards to his appointment for EMG tomorrow Tuesday  
Pt was informed that Dr. Mcgee wants him to go to his appointment for an EGD tomorrow to show if his issue is related to his back. If it's abnormal or shows anything, then we'll proceed with the MRI. Pt agreed and verbalized understanding of treatment plan of care.  
Anxiety    Anxiety    Constipation    Depression    GERD (gastroesophageal reflux disease)    GERD (gastroesophageal reflux disease)    HTN (hypertension)    Hypertension    Insomnia    Osteopenia    Pacemaker    Paroxysmal atrial fibrillation    Spastic bladder    Vitamin B12 deficiency

## 2020-06-06 NOTE — ED PROVIDER NOTE - CLINICAL SUMMARY MEDICAL DECISION MAKING FREE TEXT BOX
mechanical trip and fall with scalp hematoma. ct, xr, reassess. mechanical trip and fall with scalp hematoma. ct, xr, reassess.  Victorino: Mechanical trip and fall with scalp hematoma. cleaned out and no sutures needed. will get ct head/cspine, xrays. patient not complaining of pain. no other signs of trauma. will d/c back to [place].

## 2020-06-06 NOTE — ED PROVIDER NOTE - NSFOLLOWUPINSTRUCTIONS_ED_ALL_ED_FT
Your Cat Scans were negative. No fracture or head bleed. You didn't get any sutures/staples.     -Follow-up with your Primary Care Doctor in 1-2 weeks  -Return to the Emergency Department for any worsening or concerning symptoms such as fevers, severe pain, trouble breathing, weakness or lightheadedness.

## 2020-06-06 NOTE — ED ADULT NURSE NOTE - NSIMPLEMENTINTERV_GEN_ALL_ED
Implemented All Fall with Harm Risk Interventions:  [place] to call system. Call bell, personal items and telephone within reach. Instruct patient to call for assistance. Room bathroom lighting operational. Non-slip footwear when patient is off stretcher. Physically safe environment: no spills, clutter or unnecessary equipment. Stretcher in lowest position, wheels locked, appropriate side rails in place. Provide visual cue, wrist band, yellow gown, etc. Monitor gait and stability. Monitor for mental status changes and reorient to person, place, and time. Review medications for side effects contributing to fall risk. Reinforce activity limits and safety measures with patient and family. Provide visual clues: red socks.

## 2020-06-06 NOTE — ED PROVIDER NOTE - PHYSICAL EXAMINATION
Gen: No acute distress, alert, cooperative  HENT: Normocephalic, hematoma occipital scalp, no laceration, skin abrasion over hematoma.   Eyes: PERRL, EOMI    Resp: CTAB, non-labored, no wheeze  CV: rrr, no murmur  Abd: soft, NTND, no rebound or guarding  MSK: No CVAT bilaterally, no midline ttp  Skin: warm and dry  Neuro: AOx2, speech is fluent and appropriate, no focal deficits, normal strength and sensation all extremities  Psych: Normal affect

## 2020-06-06 NOTE — ED ADULT NURSE NOTE - CHPI ED NUR SYMPTOMS NEG
no abrasion/no deformity/no weakness/no bleeding/no numbness/no fever/no loss of consciousness/no confusion/no vomiting/no tingling

## 2020-06-06 NOTE — ED ADULT NURSE NOTE - OBJECTIVE STATEMENT
BIBA complaining of 1single mechanical fall at [facility]. As per EMS, "it was an unwitnessed fall, patient slipped and fell on a wet floor, She was able to get herself back up. Pt denies LOC, Pt has a 2 inch laceration to the back of her head. The assisted living staff gave her aspirin. The fall occurred at about 2200." Pt is A&Ox4 , VSS, does not take blood thinners. Pt states, "I slipped and fell but I got right back up and Im not in any pain right now. I walk around with a walker and I have fallen before and hit my head in the same place." Pt endorses no symptoms at present besides a fall. Pt denies headache, blurred vision, dizziness, lightheadedness, pain, Sore throat, cough SOB, chest pain, fever, chills, abdominal pain, N/V/D urinary symptoms, numbness and tingling at present. No active bleeding at present.

## 2020-06-06 NOTE — ED PROVIDER NOTE - OBJECTIVE STATEMENT
89yo hx htn presenting from the [place] after mechanical trip and fall. Not on AC. Denies all complaints. 89yo hx htn presenting from the [place] after mechanical trip and fall. Not on AC. Denies any other complaints.

## 2020-06-06 NOTE — ED PROVIDER NOTE - PATIENT PORTAL LINK FT
You can access the FollowMyHealth Patient Portal offered by [facility] by registering at the following website: [email or web address]. By joining [employer]’s FollowMyHealth portal, you will also be able to view your health information using other applications (apps) compatible with our system.

## 2020-06-07 VITALS
OXYGEN SATURATION: 96 % | TEMPERATURE: 98 F | DIASTOLIC BLOOD PRESSURE: 69 MMHG | SYSTOLIC BLOOD PRESSURE: 133 MMHG | RESPIRATION RATE: 20 BRPM | HEART RATE: 62 BPM

## 2020-06-07 PROCEDURE — 70450 CT HEAD/BRAIN W/O DYE: CPT | Mod: 26

## 2020-06-07 PROCEDURE — 72170 X-RAY EXAM OF PELVIS: CPT | Mod: 26

## 2020-06-07 PROCEDURE — 71045 X-RAY EXAM CHEST 1 VIEW: CPT | Mod: 26

## 2020-06-07 NOTE — ED ADULT NURSE REASSESSMENT NOTE - NS ED NURSE REASSESS COMMENT FT1
I called [facility] @ [address] at 1:15. I spoke with Security Meehan and gave report on Miss Villarreal status, what we did here for her and her discharge instructions. She transferred me to the Wellness Office where I was forwarded to a voicemail with no space for messages. I called Security Meehan back at 1:18 and she took my contact name and phone number down for a nurse manager or wellness member to call me back. Pt is waiting a non-emergent transport back to [facility].
Report given via telephone in [place]INK to Neiron MARTINE.
MD Paddy Ayala put in for ambulette transfer back to [facility].

## 2020-10-05 NOTE — PHYSICAL THERAPY INITIAL EVALUATION ADULT - ASR WT BEARING STATUS EVAL
LORI plan:    -  Discharge home today. Mother to transport around 3:00 pm.  -  PCP and Nephrology offices will call pt to schedule f/u appts    Pt verbalized understanding of the plan. No further concerns indicated at this time. AVS updated. Patient is ready for discharge from a Care Management standpoint. RN informed. Reason for Admission:  Right flank pain                    RUR Score:  12                   Plan for utilizing home health:  No C needs identified at this time. Pt is not homebound. PCP: First and Last name: Dr. Mickey Nguyen    Name of Practice: [facility]   Are you a current patient: Yes/No: Yes   Approximate date of last visit: 4/20/20   Can you participate in a virtual visit with your PCP: Yes                    Current Advanced Directive/Advance Care Plan: Full Code. No ACP docs on file. Transition of Care Plan: Home with OP f/u    CM spoke with patient over the phone to complete initial assessment. Patient was awake and alert/oriented x4. CM introduced role, verified demographics, and discussed discharge planning. Pt is a 38 yo female admitted to [address] [place] [address] with right flank pain related to kidney stones. Urology was consulted during this admission of which pt will need follow-up. PMHx includes lupus and breast cancer. Pt previously scheduled for surgery on 10/12/20 with Dr. Russ Starr of [facility] however pt will no longer require this procedure as she has passed the stone. Dr. Anna Crenshaw  will call pt directly to cancel this procedure and schedule a new KUB and renal US. Pt is independent in ADL/IADLs. No reported ambulation/mobility barriers. Insured with [facility]. Preferred pharmacy is [facility] in [address] [facility]. Care Management Interventions  PCP Verified by CM: Yes  Mode of Transport at Discharge: Other (see comment)(mother)  Transition of Care Consult (CM Consult):  Other(OP f/u)  Discharge Durable Medical Equipment: No  Physical Therapy Consult: No  Occupational Therapy Consult: No  Speech Therapy Consult: No  Current Support Network: Family Lives Nearby  Discharge Location  Discharge Placement: Home with outpatient services    MIKE Carter  Care Manager  [phone] no weight-bearing restrictions

## 2021-01-15 ENCOUNTER — EMERGENCY (EMERGENCY)
Facility: HOSPITAL | Age: 86
LOS: 1 days | Discharge: HOME | End: 2021-01-15
Attending: EMERGENCY MEDICINE
Payer: MEDICARE

## 2021-01-15 VITALS
HEIGHT: 62 IN | DIASTOLIC BLOOD PRESSURE: 77 MMHG | SYSTOLIC BLOOD PRESSURE: 174 MMHG | HEART RATE: 80 BPM | WEIGHT: 110.01 LBS | TEMPERATURE: 98 F | RESPIRATION RATE: 18 BRPM | OXYGEN SATURATION: 98 %

## 2021-01-15 VITALS — TEMPERATURE: 99 F

## 2021-01-15 DIAGNOSIS — Z90.710 ACQUIRED ABSENCE OF BOTH CERVIX AND UTERUS: Chronic | ICD-10-CM

## 2021-01-15 PROCEDURE — 99283 EMERGENCY DEPT VISIT LOW MDM: CPT

## 2021-01-15 PROCEDURE — 74018 RADEX ABDOMEN 1 VIEW: CPT | Mod: 26

## 2021-01-15 PROCEDURE — 99283 EMERGENCY DEPT VISIT LOW MDM: CPT | Mod: 25

## 2021-01-15 PROCEDURE — 74018 RADEX ABDOMEN 1 VIEW: CPT

## 2021-01-15 RX ADMIN — Medication 1 ENEMA: at 18:10

## 2021-01-15 NOTE — ED ADULT NURSE NOTE - NS ED NURSE AMBULANCES2
CHIEF COMPLAINT:   Chief Complaint   Patient presents with   • Cough   • Fever   • Breathing Problem        HISTORY OF THE PRESENT ILLNESS:   Patient states he started with upper respiratory infection what he thought was flu and cold on Friday 5 days ago, and as time is progressed his symptoms of young worse and now is having difficulty breathing. Patient was seen at [facility] on Monday and given albuterol inhaler. States the inhaler isn't very effective and he is extremely worried because he is becoming more and more short of breath. He has a cough which is productive of green phlegm. He also has fever chills sweats and fatigue. No chest pain. Does have a sore throat    Family History   Problem Relation Age of Onset   • OTHER Father      over weight   • Cancer Mother      hx of lung cancer-healthy now   • Heart Maternal Uncle      MI   in his 60's   • Stroke Maternal Grandfather      80   • Stroke Maternal Grandmother      80's   • Hypertension Other      cousin   • Cancer Maternal Uncle      prostate        Social History     Social History   • Marital status:      Spouse name: Simran   • Number of children: 1   • Years of education: N/A     Occupational History   •  of homes      self employed     Social History Main Topics   • Smoking status: Former Smoker     Quit date: 1/1/2001   • Smokeless tobacco: Never Used      Comment: quit 2 years   • Alcohol use Yes      Comment: \"few times a month, glass of wine\"  as of 06/02/14   • Drug use: No   • Sexual activity: Not Asked     Other Topics Concern   • None     Social History Narrative   • None       Past Surgical History:   Procedure Laterality Date   • COLONOSCOPY DIAGNOSTIC  10/07/13    Colon in 10 yrs. Dr. Blackwell.    • ESOPHAGOGASTRODUODENOSCOPY TRANSORAL FLEX W/BX SINGLE OR MULT  6/9/14    Dr. Haywood, Gastritis/Hx of Reflux/Neg Hpylori   • PTCA     • REMOVAL OF SPERM DUCT(S)  12/30/05       • REMOVAL OF TONSILS,12+ Y/O       unsure of what age   • TONSILLECTOMY AND ADENOIDECTOMY          Past Medical History:   Diagnosis Date   • Backache, unspecified    • Benign neoplasm of skin, site unspecified    • Gastroesophageal reflux disease        Allergies as of 08/02/2017 - Reviewed 08/02/2017   Allergen Reaction Noted   • Lipitor [atorvastatin calcium] MYALGIA 10/30/2014       Current Outpatient Prescriptions   Medication   • albuterol 108 (90 Base) MCG/ACT inhaler     No current facility-administered medications for this visit.        REVIEW OF SYSTEMS:  See HPI    PHYSICAL EXAMINATION:   GENERAL: The patient is a 56 year old male in no acute distress.   VITAL SIGNS: Blood pressure 146/85, pulse 102, temperature 99.7 °F (37.6 °C), temperature source Oral, resp. rate 24, weight 93.9 kg, SpO2 99 %.    H EENT, both TMs are clear. Throat is slightly reddened in the pharynx with a clear postnasal drainage. There is no neck adenopathy. Lungs reveal rhonchi with wheezes throughout both lung fields    Patient was given nebulizer treatment at 5:47 PM, subsequently chest x-ray was performed and reviewed, and patient was rechecked at   6:15 PM, which showed significant improvement in airflow and decrease in wheezing. Patient was informed of his x-ray and he will follow-up with Dr. Arechiga in 10 days for repeat x-ray    1. Dyspnea and respiratory abnormality          Orders Placed This Encounter   • XR Chest PA and Lateral       Return if symptoms worsen or fail to improve.      Ilia Ibrahim Jr, M.D.      [facility]

## 2021-01-15 NOTE — ED PROVIDER NOTE - CLINICAL SUMMARY MEDICAL DECISION MAKING FREE TEXT BOX
Dr. Jay Note: elderly female with rectal pain resolved after disimpaction...bowel regimen, sxs relief.  No signs of acute abdomen, infection.

## 2021-01-15 NOTE — ED PROVIDER NOTE - OBJECTIVE STATEMENT
89yo female pt with PMHx of HTN, GERD, Anxiety/Depression was brought to ED by EMS from [facility] c/o rectal pressure/pain since this afternoon. Pt stated she noticed rectal pain after lunch. Last BM was this morning. 89yo female pt with PMHx of HTN, GERD, Anxiety/Depression, Constipation was brought to ED by EMS from [facility] c/o rectal pressure/pain since this afternoon. Pt stated she noticed rectal pain with lower abd pain after lunch. Last BM was this morning. Denies fever, chills, cough or congestion. Denies CP/SOB or N/V. Denies sensory changes or weakness to extremities.

## 2021-01-15 NOTE — ED PROVIDER NOTE - PATIENT PORTAL LINK FT
You can access the FollowMyHealth Patient Portal offered by [facility] by registering at the following website: [email or web address]. By joining [facility]’s FollowMyHealth portal, you will also be able to view your health information using other applications (apps) compatible with our system.

## 2021-01-15 NOTE — ED ADULT NURSE NOTE - NSIMPLEMENTINTERV_GEN_ALL_ED
Implemented All Fall with Harm Risk Interventions:  [place] to call system. Call bell, personal items and telephone within reach. Instruct patient to call for assistance. Room bathroom lighting operational. Non-slip footwear when patient is off stretcher. Physically safe environment: no spills, clutter or unnecessary equipment. Stretcher in lowest position, wheels locked, appropriate side rails in place. Provide visual cue, wrist band, yellow gown, etc. Monitor gait and stability. Monitor for mental status changes and reorient to person, place, and time. Review medications for side effects contributing to fall risk. Reinforce activity limits and safety measures with patient and family. Provide visual clues: red socks.

## 2021-01-15 NOTE — ED PROVIDER NOTE - PROGRESS NOTE DETAILS
Dr. Jay Note: pt improved, will recommend bowel regimen, stable for ambulance transfer back to facility.

## 2021-01-15 NOTE — ED ADULT NURSE NOTE - OBJECTIVE STATEMENT
89 year old female presents to the ED via EMS from [facility] for prolapsed rectum causing pain. PMH of difficulty hearing, HTN, aib, HLD, pacemaker, depression/anxiety. Patient is A&Ox4, states pain has decreased "since peeing in her pants" prior to arrival to ED. VSS. Pt. denies fevers/chills, nausea, vomiting, diarrhea, dysuria, recent sick contacts, recent falls/trauma. Patient undressed and placed into gown, call bell in hand and side rails up with bed in lowest position for safety. blanket provided. Comfort and safety provided.

## 2021-01-15 NOTE — ED PROVIDER NOTE - PHYSICAL EXAMINATION
NAD, NAD, Hypertensive, Afebrile. Lungs clear. ABD soft, non tender. No CVA tender. + Move all extremities. NAD, Hypertensive, Afebrile. Lungs clear. ABD soft, non tender. No CVA tender. + External hemorrhoids without tender or thrombosed hemorrhoids. + Fecal impaction; manually removed hard stools; + Move all extremities.

## 2021-01-15 NOTE — ED ADULT NURSE REASSESSMENT NOTE - NS ED NURSE REASSESS COMMENT FT1
Report received from Nirmala BABCOCK. Patient lying comfortably in bed, cleaned and provided with clean linens. Post void bladder scan 334mL, placed on prima fit. Instructed to use call bell, call bell within reach.

## 2021-01-15 NOTE — ED PROVIDER NOTE - NSFOLLOWUPINSTRUCTIONS_ED_ALL_ED_FT
1. No signs of emergency medical condition on today's workup.  Presumptive diagnosis made, but further evaluation may be required by your primary care doctor or specialist for a definitive diagnosis.  Therefore, follow up as directed and if symptoms change/worsen or any emergency conditions, please return to the ER.   2. Presumptive diagnosis is constipation with fecal impaction.  3. You were disimpacted manually and with enema.  4. Recommend Miralax 1 capful daily with glass of water, continue indefinitely, hold for severe diarrhea.  5. Recommend colace 100mg daily.  6. Recommend fleets enema 1 bottle per rectum every 48 hours without a bowel movement.

## 2021-02-15 NOTE — ED ADULT TRIAGE NOTE - ESI TRIAGE ACUITY LEVEL, MLM
INFECTIOUS DISEASE PROGRESS NOTE    Julio Bundy Patient Status:  Inpatient    1948 MRN [id number]   [facility] 5NW-A Attending Chari Durant MD   Hosp Day # 15 DAVE Fonseca Pantoprazole Sodium (PROTONIX) EC tab 40 mg, 40 mg, Oral, Q24H  •  insulin detemir (LEVEMIR) 100 UNIT/ML flextouch 10 Units, 10 Units, Subcutaneous, Nightly  •  temazepam (RESTORIL) cap 15 mg, 15 mg, Oral, Nightly  •  hydrALAzine HCl (APRESOLINE) injection Dressing over left foot.     Laboratory Data:    Recent Labs   Lab 02/12/21  1204 02/12/21  1204 02/13/21  0548 02/13/21  1206 02/14/21  0443 02/15/21  0505   RBC 2.74*  --  2.59*  --  2.88* 2.87*   HGB 7.1*   < > 7.0* 8.2* 7.8* 7.9*   HCT 23.5*  --  21.8* 02/04/21  5:13 PM    Specimen: Foot,left; Abscess   Result Value Ref Range    Anaerobic Culture 2+ growth Bacteroides thetaiotaomicron (A) N/A   2.  AEROBIC BACTERIAL CULTURE     Status: Abnormal    Collection Time: 02/04/21  5:13 PM    Specimen: Foot,left; 2 bilaterally. There is observed phasicity and augmentation. Flow is demonstrated in the posterior tibial veins. Impression: CONCLUSION: Negative DVT study.      Dictated by (CST): Amos Palacios MD on 2/12/2021 at 8:15 PM   Finalized by (CST): KATHIA Champion

## 2021-05-25 NOTE — PROVIDER CONTACT NOTE (OTHER) - ASSESSMENT
Problem: Pain:  Description: Pain management should include both nonpharmacologic and pharmacologic interventions. Goal: Pain level will decrease  Description: Pain level will decrease  Outcome: Met This Shift     Problem: Skin Integrity:  Goal: Will show no infection signs and symptoms  Description: Will show no infection signs and symptoms  Outcome: Met This Shift  Goal: Absence of new skin breakdown  Description: Absence of new skin breakdown  Outcome: Met This Shift     Problem: Falls - Risk of:  Goal: Will remain free from falls  Description: Will remain free from falls  Outcome: Met This Shift  Goal: Absence of physical injury  Description: Absence of physical injury  Outcome: Met This Shift     Problem: Gas Exchange - Impaired:  Goal: Levels of oxygenation will improve  Description: Levels of oxygenation will improve  Outcome: Met This Shift     Problem: Infection, Septic Shock:  Goal: Will show no infection signs and symptoms  Description: Will show no infection signs and symptoms  Outcome: Met This Shift     Problem: Venous Thromboembolism:  Goal: Will show no signs or symptoms of venous thromboembolism  Description: Will show no signs or symptoms of venous thromboembolism  Outcome: Met This Shift     Problem: OXYGENATION/RESPIRATORY FUNCTION  Goal: Patient will maintain patent airway  Outcome: Met This Shift     Problem: HEMODYNAMIC STATUS  Goal: Patient has stable vital signs and fluid balance  Outcome: Met This Shift     Problem: Gas Exchange - Impaired:  Goal: Levels of oxygenation will improve  Description: Levels of oxygenation will improve  Outcome: Met This Shift     Problem: Pain:  Description: Pain management should include both nonpharmacologic and pharmacologic interventions.   Goal: Control of chronic pain  Description: Control of chronic pain  Outcome: Ongoing     Problem: Nutrition  Goal: Optimal nutrition therapy  Outcome: Ongoing     Problem: ACTIVITY INTOLERANCE/IMPAIRED MOBILITY  Goal: Mobility/activity is maintained at optimum level for patient  Outcome: Ongoing Pt with urinary retention, abd distention, orders to place zee catheter.

## 2021-08-08 ENCOUNTER — INPATIENT (INPATIENT)
Facility: HOSPITAL | Age: 86
LOS: 1 days | Discharge: NURSING, REHAB OR LONG-TERM CARE | DRG: 395 | End: 2021-08-10
Attending: INTERNAL MEDICINE | Admitting: INTERNAL MEDICINE
Payer: MEDICARE

## 2021-08-08 VITALS
WEIGHT: 110.01 LBS | SYSTOLIC BLOOD PRESSURE: 94 MMHG | HEIGHT: 62 IN | DIASTOLIC BLOOD PRESSURE: 55 MMHG | TEMPERATURE: 98 F | RESPIRATION RATE: 18 BRPM | HEART RATE: 79 BPM

## 2021-08-08 DIAGNOSIS — K92.2 GASTROINTESTINAL HEMORRHAGE, UNSPECIFIED: ICD-10-CM

## 2021-08-08 DIAGNOSIS — K21.9 GASTRO-ESOPHAGEAL REFLUX DISEASE WITHOUT ESOPHAGITIS: ICD-10-CM

## 2021-08-08 DIAGNOSIS — M85.80 OTHER SPECIFIED DISORDERS OF BONE DENSITY AND STRUCTURE, UNSPECIFIED SITE: ICD-10-CM

## 2021-08-08 DIAGNOSIS — Z90.710 ACQUIRED ABSENCE OF BOTH CERVIX AND UTERUS: Chronic | ICD-10-CM

## 2021-08-08 DIAGNOSIS — K62.5 HEMORRHAGE OF ANUS AND RECTUM: ICD-10-CM

## 2021-08-08 DIAGNOSIS — F32.89 OTHER SPECIFIED DEPRESSIVE EPISODES: ICD-10-CM

## 2021-08-08 DIAGNOSIS — I10 ESSENTIAL (PRIMARY) HYPERTENSION: ICD-10-CM

## 2021-08-08 DIAGNOSIS — Z29.9 ENCOUNTER FOR PROPHYLACTIC MEASURES, UNSPECIFIED: ICD-10-CM

## 2021-08-08 LAB
ALBUMIN SERPL ELPH-MCNC: 4.4 G/DL — SIGNIFICANT CHANGE UP (ref 3.3–5)
ALP SERPL-CCNC: 81 U/L — SIGNIFICANT CHANGE UP (ref 40–120)
ALT FLD-CCNC: 11 U/L — SIGNIFICANT CHANGE UP (ref 10–45)
ANION GAP SERPL CALC-SCNC: 14 MMOL/L — SIGNIFICANT CHANGE UP (ref 5–17)
APTT BLD: 29.8 SEC — SIGNIFICANT CHANGE UP (ref 27.5–35.5)
AST SERPL-CCNC: 17 U/L — SIGNIFICANT CHANGE UP (ref 10–40)
BASE EXCESS BLDV CALC-SCNC: 3.4 MMOL/L — HIGH (ref -2–2)
BASOPHILS # BLD AUTO: 0.05 K/UL — SIGNIFICANT CHANGE UP (ref 0–0.2)
BASOPHILS NFR BLD AUTO: 0.6 % — SIGNIFICANT CHANGE UP (ref 0–2)
BILIRUB SERPL-MCNC: 0.3 MG/DL — SIGNIFICANT CHANGE UP (ref 0.2–1.2)
BLD GP AB SCN SERPL QL: NEGATIVE — SIGNIFICANT CHANGE UP
BUN SERPL-MCNC: 21 MG/DL — SIGNIFICANT CHANGE UP (ref 7–23)
CA-I SERPL-SCNC: 1.16 MMOL/L — SIGNIFICANT CHANGE UP (ref 1.12–1.3)
CALCIUM SERPL-MCNC: 9.2 MG/DL — SIGNIFICANT CHANGE UP (ref 8.4–10.5)
CHLORIDE BLDV-SCNC: 104 MMOL/L — SIGNIFICANT CHANGE UP (ref 96–108)
CHLORIDE SERPL-SCNC: 99 MMOL/L — SIGNIFICANT CHANGE UP (ref 96–108)
CO2 BLDV-SCNC: 30 MMOL/L — SIGNIFICANT CHANGE UP (ref 22–30)
CO2 SERPL-SCNC: 23 MMOL/L — SIGNIFICANT CHANGE UP (ref 22–31)
CREAT SERPL-MCNC: 1.09 MG/DL — SIGNIFICANT CHANGE UP (ref 0.5–1.3)
EOSINOPHIL # BLD AUTO: 0.18 K/UL — SIGNIFICANT CHANGE UP (ref 0–0.5)
EOSINOPHIL NFR BLD AUTO: 2.1 % — SIGNIFICANT CHANGE UP (ref 0–6)
GAS PNL BLDV: 137 MMOL/L — SIGNIFICANT CHANGE UP (ref 135–145)
GAS PNL BLDV: SIGNIFICANT CHANGE UP
GLUCOSE BLDV-MCNC: 175 MG/DL — HIGH (ref 70–99)
GLUCOSE SERPL-MCNC: 179 MG/DL — HIGH (ref 70–99)
HCO3 BLDV-SCNC: 29 MMOL/L — SIGNIFICANT CHANGE UP (ref 21–29)
HCT VFR BLD CALC: 33.1 % — LOW (ref 34.5–45)
HCT VFR BLD CALC: 34.9 % — SIGNIFICANT CHANGE UP (ref 34.5–45)
HCT VFR BLD CALC: 35 % — SIGNIFICANT CHANGE UP (ref 34.5–45)
HCT VFR BLDA CALC: 34 % — LOW (ref 39–50)
HGB BLD CALC-MCNC: 11.1 G/DL — LOW (ref 11.5–15.5)
HGB BLD-MCNC: 10.7 G/DL — LOW (ref 11.5–15.5)
HGB BLD-MCNC: 11.4 G/DL — LOW (ref 11.5–15.5)
HGB BLD-MCNC: 11.5 G/DL — SIGNIFICANT CHANGE UP (ref 11.5–15.5)
IMM GRANULOCYTES NFR BLD AUTO: 0.4 % — SIGNIFICANT CHANGE UP (ref 0–1.5)
INR BLD: 1 RATIO — SIGNIFICANT CHANGE UP (ref 0.88–1.16)
LACTATE BLDV-MCNC: 1.9 MMOL/L — SIGNIFICANT CHANGE UP (ref 0.7–2)
LYMPHOCYTES # BLD AUTO: 1.04 K/UL — SIGNIFICANT CHANGE UP (ref 1–3.3)
LYMPHOCYTES # BLD AUTO: 12.2 % — LOW (ref 13–44)
MCHC RBC-ENTMCNC: 31 PG — SIGNIFICANT CHANGE UP (ref 27–34)
MCHC RBC-ENTMCNC: 31.2 PG — SIGNIFICANT CHANGE UP (ref 27–34)
MCHC RBC-ENTMCNC: 32.3 GM/DL — SIGNIFICANT CHANGE UP (ref 32–36)
MCHC RBC-ENTMCNC: 32.7 GM/DL — SIGNIFICANT CHANGE UP (ref 32–36)
MCV RBC AUTO: 95.6 FL — SIGNIFICANT CHANGE UP (ref 80–100)
MCV RBC AUTO: 95.9 FL — SIGNIFICANT CHANGE UP (ref 80–100)
MONOCYTES # BLD AUTO: 0.65 K/UL — SIGNIFICANT CHANGE UP (ref 0–0.9)
MONOCYTES NFR BLD AUTO: 7.6 % — SIGNIFICANT CHANGE UP (ref 2–14)
NEUTROPHILS # BLD AUTO: 6.59 K/UL — SIGNIFICANT CHANGE UP (ref 1.8–7.4)
NEUTROPHILS NFR BLD AUTO: 77.1 % — HIGH (ref 43–77)
NRBC # BLD: 0 /100 WBCS — SIGNIFICANT CHANGE UP (ref 0–0)
NRBC # BLD: 0 /100 WBCS — SIGNIFICANT CHANGE UP (ref 0–0)
OTHER CELLS CSF MANUAL: 10 ML/DL — LOW (ref 18–22)
PCO2 BLDV: 49 MMHG — SIGNIFICANT CHANGE UP (ref 35–50)
PH BLDV: 7.38 — SIGNIFICANT CHANGE UP (ref 7.35–7.45)
PLATELET # BLD AUTO: 228 K/UL — SIGNIFICANT CHANGE UP (ref 150–400)
PLATELET # BLD AUTO: 265 K/UL — SIGNIFICANT CHANGE UP (ref 150–400)
PO2 BLDV: 34 MMHG — SIGNIFICANT CHANGE UP (ref 25–45)
POTASSIUM BLDV-SCNC: 4.2 MMOL/L — SIGNIFICANT CHANGE UP (ref 3.5–5.3)
POTASSIUM SERPL-MCNC: 4.3 MMOL/L — SIGNIFICANT CHANGE UP (ref 3.5–5.3)
POTASSIUM SERPL-SCNC: 4.3 MMOL/L — SIGNIFICANT CHANGE UP (ref 3.5–5.3)
PROT SERPL-MCNC: 7.7 G/DL — SIGNIFICANT CHANGE UP (ref 6–8.3)
PROTHROM AB SERPL-ACNC: 12 SEC — SIGNIFICANT CHANGE UP (ref 10.6–13.6)
RBC # BLD: 3.45 M/UL — LOW (ref 3.8–5.2)
RBC # BLD: 3.65 M/UL — LOW (ref 3.8–5.2)
RBC # FLD: 13.6 % — SIGNIFICANT CHANGE UP (ref 10.3–14.5)
RBC # FLD: 14.2 % — SIGNIFICANT CHANGE UP (ref 10.3–14.5)
RH IG SCN BLD-IMP: NEGATIVE — SIGNIFICANT CHANGE UP
SAO2 % BLDV: 62 % — LOW (ref 67–88)
SARS-COV-2 RNA SPEC QL NAA+PROBE: SIGNIFICANT CHANGE UP
SODIUM SERPL-SCNC: 136 MMOL/L — SIGNIFICANT CHANGE UP (ref 135–145)
WBC # BLD: 8.54 K/UL — SIGNIFICANT CHANGE UP (ref 3.8–10.5)
WBC # BLD: 9.44 K/UL — SIGNIFICANT CHANGE UP (ref 3.8–10.5)
WBC # FLD AUTO: 8.54 K/UL — SIGNIFICANT CHANGE UP (ref 3.8–10.5)
WBC # FLD AUTO: 9.44 K/UL — SIGNIFICANT CHANGE UP (ref 3.8–10.5)

## 2021-08-08 PROCEDURE — 74178 CT ABD&PLV WO CNTR FLWD CNTR: CPT | Mod: 26,MG

## 2021-08-08 PROCEDURE — G1004: CPT

## 2021-08-08 PROCEDURE — 99223 1ST HOSP IP/OBS HIGH 75: CPT

## 2021-08-08 PROCEDURE — 93010 ELECTROCARDIOGRAM REPORT: CPT | Mod: GC

## 2021-08-08 PROCEDURE — 99291 CRITICAL CARE FIRST HOUR: CPT | Mod: GC

## 2021-08-08 RX ORDER — PANTOPRAZOLE SODIUM 20 MG/1
80 TABLET, DELAYED RELEASE ORAL ONCE
Refills: 0 | Status: COMPLETED | OUTPATIENT
Start: 2021-08-08 | End: 2021-08-08

## 2021-08-08 RX ORDER — PANTOPRAZOLE SODIUM 20 MG/1
40 TABLET, DELAYED RELEASE ORAL DAILY
Refills: 0 | Status: DISCONTINUED | OUTPATIENT
Start: 2021-08-09 | End: 2021-08-10

## 2021-08-08 RX ORDER — PREGABALIN 225 MG/1
1000 CAPSULE ORAL DAILY
Refills: 0 | Status: DISCONTINUED | OUTPATIENT
Start: 2021-08-08 | End: 2021-08-10

## 2021-08-08 RX ORDER — SALIVA SUBSTITUTE COMB NO.11 351 MG
15 POWDER IN PACKET (EA) MUCOUS MEMBRANE
Qty: 0 | Refills: 0 | DISCHARGE

## 2021-08-08 RX ORDER — ASPIRIN/CALCIUM CARB/MAGNESIUM 324 MG
1 TABLET ORAL
Qty: 0 | Refills: 0 | DISCHARGE

## 2021-08-08 RX ORDER — CALCIUM CARBONATE 500(1250)
2 TABLET ORAL
Qty: 0 | Refills: 0 | DISCHARGE

## 2021-08-08 RX ORDER — OXYBUTYNIN CHLORIDE 5 MG
5 TABLET ORAL DAILY
Refills: 0 | Status: DISCONTINUED | OUTPATIENT
Start: 2021-08-08 | End: 2021-08-10

## 2021-08-08 RX ORDER — ONDANSETRON 8 MG/1
4 TABLET, FILM COATED ORAL EVERY 8 HOURS
Refills: 0 | Status: DISCONTINUED | OUTPATIENT
Start: 2021-08-08 | End: 2021-08-10

## 2021-08-08 RX ORDER — DILTIAZEM HCL 120 MG
1 CAPSULE, EXT RELEASE 24 HR ORAL
Qty: 0 | Refills: 0 | DISCHARGE

## 2021-08-08 RX ORDER — ZOLPIDEM TARTRATE 10 MG/1
5 TABLET ORAL AT BEDTIME
Refills: 0 | Status: DISCONTINUED | OUTPATIENT
Start: 2021-08-08 | End: 2021-08-10

## 2021-08-08 RX ORDER — ACETAMINOPHEN 500 MG
1 TABLET ORAL
Qty: 0 | Refills: 0 | DISCHARGE

## 2021-08-08 RX ORDER — MIRTAZAPINE 45 MG/1
3 TABLET, ORALLY DISINTEGRATING ORAL
Qty: 0 | Refills: 0 | DISCHARGE

## 2021-08-08 RX ORDER — ACETAMINOPHEN 500 MG
650 TABLET ORAL EVERY 6 HOURS
Refills: 0 | Status: DISCONTINUED | OUTPATIENT
Start: 2021-08-08 | End: 2021-08-10

## 2021-08-08 RX ORDER — CITALOPRAM 10 MG/1
20 TABLET, FILM COATED ORAL DAILY
Refills: 0 | Status: DISCONTINUED | OUTPATIENT
Start: 2021-08-08 | End: 2021-08-10

## 2021-08-08 RX ORDER — TRAZODONE HCL 50 MG
50 TABLET ORAL AT BEDTIME
Refills: 0 | Status: DISCONTINUED | OUTPATIENT
Start: 2021-08-08 | End: 2021-08-10

## 2021-08-08 RX ORDER — ASCORBIC ACID 60 MG
1 TABLET,CHEWABLE ORAL
Qty: 0 | Refills: 0 | DISCHARGE

## 2021-08-08 RX ORDER — GABAPENTIN 400 MG/1
300 CAPSULE ORAL
Refills: 0 | Status: DISCONTINUED | OUTPATIENT
Start: 2021-08-08 | End: 2021-08-10

## 2021-08-08 RX ORDER — CHOLECALCIFEROL (VITAMIN D3) 125 MCG
400 CAPSULE ORAL DAILY
Refills: 0 | Status: DISCONTINUED | OUTPATIENT
Start: 2021-08-08 | End: 2021-08-10

## 2021-08-08 RX ORDER — CLONAZEPAM 1 MG
0.25 TABLET ORAL
Refills: 0 | Status: DISCONTINUED | OUTPATIENT
Start: 2021-08-08 | End: 2021-08-10

## 2021-08-08 RX ADMIN — Medication 0.25 MILLIGRAM(S): at 18:11

## 2021-08-08 RX ADMIN — CITALOPRAM 20 MILLIGRAM(S): 10 TABLET, FILM COATED ORAL at 18:11

## 2021-08-08 RX ADMIN — ZOLPIDEM TARTRATE 5 MILLIGRAM(S): 10 TABLET ORAL at 22:49

## 2021-08-08 RX ADMIN — Medication 7.5 MILLIGRAM(S): at 18:11

## 2021-08-08 RX ADMIN — GABAPENTIN 300 MILLIGRAM(S): 400 CAPSULE ORAL at 18:11

## 2021-08-08 RX ADMIN — Medication 1 TABLET(S): at 18:11

## 2021-08-08 RX ADMIN — Medication 50 MILLIGRAM(S): at 21:10

## 2021-08-08 RX ADMIN — PANTOPRAZOLE SODIUM 80 MILLIGRAM(S): 20 TABLET, DELAYED RELEASE ORAL at 10:11

## 2021-08-08 NOTE — ED PROVIDER NOTE - OBJECTIVE STATEMENT
89y/o F from [facility] w/ h/o HTN (on beta blocker), hemorrhoids BIBEMS for rectal bleeding starting this morning. As per EMS pt lost a lot of blood. No syncope, dizziness, lightheadedness, abdominal pain, sob, blood thinners.

## 2021-08-08 NOTE — H&P ADULT - ASSESSMENT
89 yo female w/pmh anxiety, depression, HTN, GERD, very hard of hearing, presents to [facility] for cc bright red blood per rectum.

## 2021-08-08 NOTE — H&P ADULT - PROBLEM SELECTOR PLAN 1
- might be diverticulosis, hemorrhoidal bleed  - hold off on GI consult - patient saying she does not want colonoscopy  - trend cbc q8  - transfuse to maintain hgb >7  - CT A/P no active bleed seen

## 2021-08-08 NOTE — ED PROVIDER NOTE - ATTENDING CONTRIBUTION TO CARE
Patient BIBEMS from assisted living for rectal bleeding.  Per EMS BP normal at assisted living initially now declining.  Patient reporting feeling well.  BP's 90's systolic in exam room, rectal dark blood, abdomen soft non tender.  Will start protonix for possible UGI source, transfusion for bleeding with hypotension, labs/EKG, GI consult, admission. Patient BIBEMS from assisted living for rectal bleeding.  Per EMS BP normal at assisted living initially now declining.  Patient reporting feeling well.  BP's 90's systolic in exam room, normal HR but on beta-blockers, rectal dark blood, abdomen soft non tender.  Will start protonix for possible UGI source, transfusion for bleeding with hypotension, labs/EKG, GI consult, admission.

## 2021-08-08 NOTE — H&P ADULT - NSHPPHYSICALEXAM_GEN_ALL_CORE
Vital Signs Last 24 Hrs  T(C): 36.9 (08 Aug 2021 16:40), Max: 36.9 (08 Aug 2021 16:20)  T(F): 98.4 (08 Aug 2021 16:40), Max: 98.5 (08 Aug 2021 16:20)  HR: 61 (08 Aug 2021 16:40) (56 - 79)  BP: 138/48 (08 Aug 2021 16:40) (93/40 - 138/48)  BP(mean): 70 (08 Aug 2021 16:40) (70 - 70)  RR: 18 (08 Aug 2021 16:40) (15 - 18)  SpO2: 94% (08 Aug 2021 16:40) (93% - 96%)    CONSTITUTIONAL: Well-groomed, in no apparent distress, petite, elderly  EYES: No conjunctival or scleral injection, non-icteric; PERRLA and symmetric  ENMT: No external nasal lesions; no pharyngeal injection or exudates, oral mucosa with moist membranes. Poor dentition.   NECK: Trachea midline without palpable neck mass; thyroid not enlarged and non-tender  RESPIRATORY: Breathing comfortably; lungs CTA without wheeze/rhonchi/rales  CARDIOVASCULAR: +S1S2, RRR, no M/G/R; pedal pulses full and symmetric; no lower extremity edema on right leg. LLE 1+ pitting edema compared to right side.   GASTROINTESTINAL: No palpable masses or tenderness, +BS throughout, no rebound/guarding; no hepatosplenomegaly; no hernia palpated  LYMPHATIC: No cervical LAD or tenderness; no axillary LAD or tenderness  MUSCULOSKELETAL: no digital clubbing or cyanosis; no paraspinal tenderness; normal strength and tone of extremities  SKIN: No rashes or ulcers noted; no subcutaneous nodules or induration palpable  NEUROLOGIC: CN II-XII intact except very hard of hearing; sensation intact in LEs b/l to light touch  PSYCHIATRIC: A+O x 3; mood and affect appropriate; appropriate insight and judgment

## 2021-08-08 NOTE — H&P ADULT - HISTORY OF PRESENT ILLNESS
89 yo female w/pmh anxiety, depression, HTN, GERD, very hard of hearing, presents to [facility] for cc bright red blood per rectum. Per patient she usually has "1 drop" of blood whenever she has a BM but this morning blood was coming like it was "[place]" and her nursing home decided to send her to the hospital. Currently, she denies shortness of breath, chest pain, dizziness. No abdominal pain.     In the ER, patient was hemodynamically stable, with some SBPs in the 90s. CTA A/P obtained showed no e/o of brisk bleed. She was ordered for 1u prbc. Hgb 10.7. Patient says she does not want a colonoscopy because she's "too old for that now". She thinks she has never had a colonoscopy before either.

## 2021-08-08 NOTE — ED PROVIDER NOTE - PHYSICAL EXAMINATION
Gen: NAD, non-toxic appearing  Head: normal appearing  HEENT: normal conjunctiva, oral mucosa moist  Lung: no respiratory distress, speaking in full sentences, CTA b/l     CV: regular rate and rhythm, no murmurs  Abd: soft, distended, non tender, external hemorrhoids with dark stool in rectal vault without tenderness  MSK: no visible deformities  Neuro: No focal deficits, AAOx3  Skin: Warm  Psych: normal affect

## 2021-08-08 NOTE — ED ADULT NURSE REASSESSMENT NOTE - NS ED NURSE REASSESS COMMENT FT1
1st unit [facility] #[id number] initiated per md's orders.  vss at intiation.  no s/s of reaction noted at this time at 15 min check.  see v/s flow sheet for details.  pt resting with safety precautions in place.  will continue to monitor.

## 2021-08-08 NOTE — H&P ADULT - NSHPADDITIONALINFOADULT_GEN_ALL_CORE
Plan discussed with ACP Alisha Coates.    Tried to discuss code status - difficult since patient is so hard of hearing. She does not quite understand what CPR would entail, however says we should do what we think is right. Would re-address tomorrow, possibly with HCP. For now patient will remain full code.    Anuradha Newberry DO  Pager #: [phone]  Available on Teams lew

## 2021-08-08 NOTE — H&P ADULT - NSICDXPASTMEDICALHX_GEN_ALL_CORE_FT
PAST MEDICAL HISTORY:  Anxiety     Anxiety     Constipation     Depression     GERD (gastroesophageal reflux disease)     GERD (gastroesophageal reflux disease)     HTN (hypertension)     Hypertension     Insomnia     Osteopenia     Pacemaker     Paroxysmal atrial fibrillation     Spastic bladder     Vitamin B12 deficiency

## 2021-08-08 NOTE — ED ADULT NURSE NOTE - OBJECTIVE STATEMENT
89 y/o [place] female with pmhx of HTN, insomnia, neuralgia, chronic contstipation, hemhorrids and depression biba from [facility] for c/o sudden onset of rectal bleeding this AM.  per pt, she noticed the blood when she got up to go the bathroom this morning.  pt denies any cp, dizziness, n/v/d at this time.  pt is awake, alert and responsive to all stimuli.  no sob or respiratory distress noted.  abdomen is soft, non distended and non tender upon palpation.  no visible blood noted externally but noted upon rectal examination.  pt medicated per md's orders.  sbp=hi 90's with all other v/s WNL.  safety precautions in place.  will continue to monitor.

## 2021-08-08 NOTE — ED ADULT NURSE NOTE - NSIMPLEMENTINTERV_GEN_ALL_ED
Implemented All Fall with Harm Risk Interventions:  [place] to call system. Call bell, personal items and telephone within reach. Instruct patient to call for assistance. Room bathroom lighting operational. Non-slip footwear when patient is off stretcher. Physically safe environment: no spills, clutter or unnecessary equipment. Stretcher in lowest position, wheels locked, appropriate side rails in place. Provide visual cue, wrist band, yellow gown, etc. Monitor gait and stability. Monitor for mental status changes and reorient to person, place, and time. Review medications for side effects contributing to fall risk. Reinforce activity limits and safety measures with patient and family. Provide visual clues: red socks.

## 2021-08-08 NOTE — ED PROVIDER NOTE - CLINICAL SUMMARY MEDICAL DECISION MAKING FREE TEXT BOX
89y/o F from [facility] w/ h/o HTN (on beta blocker), hemorrhoids BIBEMS for rectal bleeding without anemia symptoms. BP wnl and mentating well, normal appearing. will give protonix, trend h/h, check BP every 1h and admission for GI bleed. 89y/o F from [facility] w/ h/o HTN (on beta blocker), hemorrhoids BIBEMS for rectal bleeding without anemia symptoms. BP wnl here and mentating well, normal appearing. will give protonix, trend h/h, check BP every 1h and admission for GI bleed. Possible CTA if clinical status changes.

## 2021-08-08 NOTE — ED PROVIDER NOTE - PROGRESS NOTE DETAILS
Osmin, PGY3: rpt BP 94/55, HR60 will put in for CTA and give blood. Osmin, PGY3: rpt BP 94/55, HR60 will put in for CTA and give 1U prbc

## 2021-08-08 NOTE — ED PROVIDER NOTE - NS ED ROS FT
GENERAL: no fever, no chills  EYES: no change in vision  HEENT: no trouble swallowing, no trouble speaking  CARDIAC: no chest pain, no palpitations  PULMONARY: no cough, no SOB  GI: +rectal bleeding, no abdominal pain, no nausea, no vomiting, no diarrhea, no constipation  : no dysuria, no frequency, no change in appearance, no odor of urine  SKIN: no rashes  NEURO: no headache, no weakness  MSK: no joint pain

## 2021-08-08 NOTE — H&P ADULT - NSHPREVIEWOFSYSTEMS_GEN_ALL_CORE
REVIEW OF SYSTEMS:    CONSTITUTIONAL: No weakness, weight loss, fevers or chills  EYES/ENT: No visual changes;  No vertigo or throat pain   NECK: No pain or stiffness  RESPIRATORY: No cough, wheezing, hemoptysis; No shortness of breath  CARDIOVASCULAR: No chest pain or palpitations, MEI  GASTROINTESTINAL: No abdominal or epigastric pain. No nausea, vomiting, or hematemesis; No diarrhea or constipation. + hematochezia.  GENITOURINARY: No dysuria, frequency or hematuria  NEUROLOGICAL: No numbness or weakness, AAOX3  SKIN: No itching, rashes  MUSCULOSKELETAL: no joint erythema, no joint swelling  PSYCHIATRIC: no depression, no anxiety

## 2021-08-08 NOTE — ED ADULT NURSE NOTE - ED STAT RN HANDOFF DETAILS
pt endorsed to SADIQ Mckinney for continuum of care. pt endorsed to RN Bernard for continuum of care.

## 2021-08-09 LAB
ANION GAP SERPL CALC-SCNC: 11 MMOL/L — SIGNIFICANT CHANGE UP (ref 5–17)
BUN SERPL-MCNC: 17 MG/DL — SIGNIFICANT CHANGE UP (ref 7–23)
CALCIUM SERPL-MCNC: 9.5 MG/DL — SIGNIFICANT CHANGE UP (ref 8.4–10.5)
CHLORIDE SERPL-SCNC: 104 MMOL/L — SIGNIFICANT CHANGE UP (ref 96–108)
CO2 SERPL-SCNC: 24 MMOL/L — SIGNIFICANT CHANGE UP (ref 22–31)
CREAT SERPL-MCNC: 0.99 MG/DL — SIGNIFICANT CHANGE UP (ref 0.5–1.3)
GLUCOSE SERPL-MCNC: 103 MG/DL — HIGH (ref 70–99)
HCT VFR BLD CALC: 31.9 % — LOW (ref 34.5–45)
HGB BLD-MCNC: 10.5 G/DL — LOW (ref 11.5–15.5)
MAGNESIUM SERPL-MCNC: 2.2 MG/DL — SIGNIFICANT CHANGE UP (ref 1.6–2.6)
MCHC RBC-ENTMCNC: 31.4 PG — SIGNIFICANT CHANGE UP (ref 27–34)
MCHC RBC-ENTMCNC: 32.9 GM/DL — SIGNIFICANT CHANGE UP (ref 32–36)
MCV RBC AUTO: 95.5 FL — SIGNIFICANT CHANGE UP (ref 80–100)
NRBC # BLD: 0 /100 WBCS — SIGNIFICANT CHANGE UP (ref 0–0)
PHOSPHATE SERPL-MCNC: 4.1 MG/DL — SIGNIFICANT CHANGE UP (ref 2.5–4.5)
PLATELET # BLD AUTO: 222 K/UL — SIGNIFICANT CHANGE UP (ref 150–400)
POTASSIUM SERPL-MCNC: 4.2 MMOL/L — SIGNIFICANT CHANGE UP (ref 3.5–5.3)
POTASSIUM SERPL-SCNC: 4.2 MMOL/L — SIGNIFICANT CHANGE UP (ref 3.5–5.3)
RBC # BLD: 3.34 M/UL — LOW (ref 3.8–5.2)
RBC # FLD: 14.5 % — SIGNIFICANT CHANGE UP (ref 10.3–14.5)
SODIUM SERPL-SCNC: 139 MMOL/L — SIGNIFICANT CHANGE UP (ref 135–145)
WBC # BLD: 9.37 K/UL — SIGNIFICANT CHANGE UP (ref 3.8–10.5)
WBC # FLD AUTO: 9.37 K/UL — SIGNIFICANT CHANGE UP (ref 3.8–10.5)

## 2021-08-09 RX ORDER — NIFEDIPINE 30 MG
60 TABLET, EXTENDED RELEASE 24 HR ORAL DAILY
Refills: 0 | Status: DISCONTINUED | OUTPATIENT
Start: 2021-08-09 | End: 2021-08-10

## 2021-08-09 RX ORDER — METOPROLOL TARTRATE 50 MG
25 TABLET ORAL DAILY
Refills: 0 | Status: DISCONTINUED | OUTPATIENT
Start: 2021-08-09 | End: 2021-08-10

## 2021-08-09 RX ORDER — LOSARTAN POTASSIUM 100 MG/1
100 TABLET, FILM COATED ORAL DAILY
Refills: 0 | Status: DISCONTINUED | OUTPATIENT
Start: 2021-08-09 | End: 2021-08-10

## 2021-08-09 RX ADMIN — PANTOPRAZOLE SODIUM 40 MILLIGRAM(S): 20 TABLET, DELAYED RELEASE ORAL at 12:26

## 2021-08-09 RX ADMIN — Medication 50 MILLIGRAM(S): at 21:53

## 2021-08-09 RX ADMIN — Medication 5 MILLIGRAM(S): at 12:23

## 2021-08-09 RX ADMIN — CITALOPRAM 20 MILLIGRAM(S): 10 TABLET, FILM COATED ORAL at 12:26

## 2021-08-09 RX ADMIN — PREGABALIN 1000 MICROGRAM(S): 225 CAPSULE ORAL at 12:23

## 2021-08-09 RX ADMIN — Medication 0.25 MILLIGRAM(S): at 18:28

## 2021-08-09 RX ADMIN — Medication 25 MILLIGRAM(S): at 18:28

## 2021-08-09 RX ADMIN — Medication 1 TABLET(S): at 12:26

## 2021-08-09 RX ADMIN — Medication 7.5 MILLIGRAM(S): at 05:04

## 2021-08-09 RX ADMIN — ZOLPIDEM TARTRATE 5 MILLIGRAM(S): 10 TABLET ORAL at 21:54

## 2021-08-09 RX ADMIN — LOSARTAN POTASSIUM 100 MILLIGRAM(S): 100 TABLET, FILM COATED ORAL at 18:27

## 2021-08-09 RX ADMIN — Medication 0.25 MILLIGRAM(S): at 05:04

## 2021-08-09 RX ADMIN — Medication 7.5 MILLIGRAM(S): at 17:29

## 2021-08-09 RX ADMIN — GABAPENTIN 300 MILLIGRAM(S): 400 CAPSULE ORAL at 05:04

## 2021-08-09 RX ADMIN — GABAPENTIN 300 MILLIGRAM(S): 400 CAPSULE ORAL at 17:30

## 2021-08-09 RX ADMIN — Medication 400 UNIT(S): at 12:23

## 2021-08-09 NOTE — PHYSICAL THERAPY INITIAL EVALUATION ADULT - DISCHARGE DISPOSITION, PT EVAL
TBD upon assessment of functional evaluation Return to [facility] with [facility], continue to use RW for functional ambulation at all times

## 2021-08-09 NOTE — PHYSICAL THERAPY INITIAL EVALUATION ADULT - PRECAUTIONS/LIMITATIONS, REHAB EVAL
Currently, she denies shortness of breath, chest pain, dizziness. No abdominal pain. In the ER, patient was hemodynamically stable, with some SBPs in the 90s. CTA A/P obtained showed no e/o of brisk bleed. She was ordered for 1u prbc. Hgb 10.7. Patient says she does not want a colonoscopy because she's "too old for that now". She thinks she has never had a colonoscopy before either. Pt admitted to [address] [place] [address] for further management and monitoring, PT consulted for patients' functional status./fall precautions

## 2021-08-09 NOTE — DISCHARGE NOTE PROVIDER - CARE PROVIDER_API CALL
Juan Luis Salinas  INTERNAL MEDICINE  [facility] [place] [address]  Phone: [phone]  Fax: [phone]  Follow Up Time: 1 week

## 2021-08-09 NOTE — PHYSICAL THERAPY INITIAL EVALUATION ADULT - LIVES WITH, PROFILE
Pt resides in the [facility] in [facility] with no stairs to negotiate. Pt reports being functionally independent in all aspects of functional mobility with RW and ADLs. Per care coordination note, patients' son reports patient required Min A for ADLs at the [facility]./alone

## 2021-08-09 NOTE — PHYSICAL THERAPY INITIAL EVALUATION ADULT - LEVEL OF INDEPENDENCE: GAIT, REHAB EVAL
due to elevated BP/unable to perform close supervision on straightaways, CGAx1 to navigate turns/supervision/contact guard

## 2021-08-09 NOTE — DISCHARGE NOTE PROVIDER - NSDCMRMEDTOKEN_GEN_ALL_CORE_FT
Ambien 5 mg oral tablet: 1 tab(s) orally once a day (at bedtime), As Needed for insomnia  B 100 Complex oral tablet: 1 tab(s) orally once a day  busPIRone 7.5 mg oral tablet: 1 tab(s) orally 2 times a day  CeleXA 20 mg oral tablet: 1 tab(s) orally once a day  Centrum oral tablet: 1 tab(s) orally once a day  clonazePAM 0.25 mg oral tablet, disintegratin tab(s) orally 2 times a day  Cozaar 100 mg oral tablet: 1 tab(s) orally once a day  gabapentin 300 mg oral capsule: 1 cap(s) orally 2 times a day  Imodium A-D 2 mg oral tablet: 1 tab(s) orally once a day  Metoprolol Succinate ER 25 mg oral tablet, extended release: 1 tab(s) orally once a day  NIFEdipine 60 mg oral tablet, extended release: 1 tab(s) orally once a day  oxybutynin 5 mg/24 hours oral tablet, extended release: 1 tab(s) orally once a day  Oyster Shell Calcium with Vitamin D 500 mg-5 mcg (200 intl units) oral tablet: 1 tab(s) orally once a day  pantoprazole 40 mg oral delayed release tablet: 1 tab(s) orally once a day  traZODone 50 mg oral tablet: 1 tab(s) orally once a day (at bedtime)  Vitamin B12 1000 mcg oral tablet: 1 tab(s) orally once a day  Vitamin D3 400 intl units oral tablet: 1 tab(s) orally once a day

## 2021-08-09 NOTE — PROVIDER CONTACT NOTE (OTHER) - ASSESSMENT
Patient alert and awake. VSS monitored. Denies CP/SOB/Palpitations.  Spo2 at RA 88%. Oxygen 2l/min via NC given. Spo2 improved >92%. No SOB.

## 2021-08-09 NOTE — PHYSICAL THERAPY INITIAL EVALUATION ADULT - LEVEL OF INDEPENDENCE: SIT/STAND, REHAB EVAL
Patients' BP in sittin/83, pt returned to supine with supervision. PT notified Med TOVA Chandra and SADIQ Kelly regarding patients' elevated BP/unable to perform contact guard

## 2021-08-09 NOTE — PHYSICAL THERAPY INITIAL EVALUATION ADULT - PERTINENT HX OF CURRENT PROBLEM, REHAB EVAL
89 yo female w/ pmhx anxiety, depression, HTN, GERD, very hard of hearing, presents to [facility] for cc bright red blood coming from rectum. Per patient she usually has "1 drop" of blood whenever she has a BM but this morning blood was coming out like it was "[place]" and her group home decided to send her to the hospital. CONTINUED:

## 2021-08-09 NOTE — PHYSICAL THERAPY INITIAL EVALUATION ADULT - DIAGNOSIS, PT EVAL
Pt presents with impairments in strength, balance, and endurance impacting ability to perform ADLs and functional mobility

## 2021-08-09 NOTE — DISCHARGE NOTE PROVIDER - HOSPITAL COURSE
89 y/o F w/PMHx of anxiety, depression, HTN, GERD, very hard of hearing, presents to [facility] for cc bright red blood per rectum. Per patient she usually has "1 drop" of blood whenever she has a BM but this morning blood was coming like it was "[place]" and her nursing home decided to send her to the hospital. Currently, she denies shortness of breath, chest pain, dizziness. No abdominal pain.     In the ER, patient was hemodynamically stable, with some SBPs in the 90s. CTA A/P obtained showed no e/o of brisk bleed. She was ordered for 1u prbc. Hgb 10.7. Patient says she does not want a colonoscopy because she's "too old for that now". She thinks she has never had a colonoscopy before either.   Started on PPI IV BID. Can change to PO QD upon discharge.   H/H remains stable.   Diet advanced on 8/9. Remains able to tolerate.   [facility]     Pt stable for discharge back to [facility].

## 2021-08-09 NOTE — PHYSICAL THERAPY INITIAL EVALUATION ADULT - GENERAL OBSERVATIONS, REHAB EVAL
Pt received semi-supine in bed in NAD, +Tele, +primafit, +IVL, VSS, agreeable to participate in therapy at this time

## 2021-08-09 NOTE — PROVIDER CONTACT NOTE (OTHER) - BACKGROUND
89y/o F from [facility] w/ h/o HTN (on beta blocker), hemorrhoids BIBEMS for rectal bleeding starting this morning. S/P 1 unit of PRBC

## 2021-08-09 NOTE — DISCHARGE NOTE PROVIDER - NSDCCPCAREPLAN_GEN_ALL_CORE_FT
PRINCIPAL DISCHARGE DIAGNOSIS  Diagnosis: Gastrointestinal hemorrhage associated with anorectal source  Assessment and Plan of Treatment: Remains stable. Likely secondary to hemorrhoidal bleeding. Continue taking pantoprazole daily as prescribed.       PRINCIPAL DISCHARGE DIAGNOSIS  Diagnosis: Gastrointestinal hemorrhage associated with anorectal source  Assessment and Plan of Treatment: Hemaglobin and Hematocrit levels remain stable. Bleeding likely secondary to hemorrhoidal bleeding. Continue taking pantoprazole daily as prescribed. Avoid constipation or anal trauma.

## 2021-08-09 NOTE — PHYSICAL THERAPY INITIAL EVALUATION ADULT - ADDITIONAL COMMENTS
CT Abdomen & Pelvis 8/8/21 IMPRESSION:  1.  No evidence of active arterial GI bleed.  2.  No evidence of acute inflammatory or obstructive process in the abdomen and pelvis.

## 2021-08-10 VITALS
OXYGEN SATURATION: 90 % | RESPIRATION RATE: 18 BRPM | SYSTOLIC BLOOD PRESSURE: 128 MMHG | DIASTOLIC BLOOD PRESSURE: 63 MMHG | TEMPERATURE: 98 F | HEART RATE: 69 BPM

## 2021-08-10 LAB
ANION GAP SERPL CALC-SCNC: 11 MMOL/L — SIGNIFICANT CHANGE UP (ref 5–17)
BUN SERPL-MCNC: 18 MG/DL — SIGNIFICANT CHANGE UP (ref 7–23)
CALCIUM SERPL-MCNC: 9.3 MG/DL — SIGNIFICANT CHANGE UP (ref 8.4–10.5)
CHLORIDE SERPL-SCNC: 101 MMOL/L — SIGNIFICANT CHANGE UP (ref 96–108)
CO2 SERPL-SCNC: 24 MMOL/L — SIGNIFICANT CHANGE UP (ref 22–31)
COVID-19 SPIKE DOMAIN AB INTERP: POSITIVE
COVID-19 SPIKE DOMAIN ANTIBODY RESULT: 204 U/ML — HIGH
CREAT SERPL-MCNC: 1.04 MG/DL — SIGNIFICANT CHANGE UP (ref 0.5–1.3)
GLUCOSE SERPL-MCNC: 95 MG/DL — SIGNIFICANT CHANGE UP (ref 70–99)
HCT VFR BLD CALC: 33.4 % — LOW (ref 34.5–45)
HGB BLD-MCNC: 10.9 G/DL — LOW (ref 11.5–15.5)
MAGNESIUM SERPL-MCNC: 2.1 MG/DL — SIGNIFICANT CHANGE UP (ref 1.6–2.6)
MCHC RBC-ENTMCNC: 30.8 PG — SIGNIFICANT CHANGE UP (ref 27–34)
MCHC RBC-ENTMCNC: 32.6 GM/DL — SIGNIFICANT CHANGE UP (ref 32–36)
MCV RBC AUTO: 94.4 FL — SIGNIFICANT CHANGE UP (ref 80–100)
NRBC # BLD: 0 /100 WBCS — SIGNIFICANT CHANGE UP (ref 0–0)
PHOSPHATE SERPL-MCNC: 4.1 MG/DL — SIGNIFICANT CHANGE UP (ref 2.5–4.5)
PLATELET # BLD AUTO: 236 K/UL — SIGNIFICANT CHANGE UP (ref 150–400)
POTASSIUM SERPL-MCNC: 4.4 MMOL/L — SIGNIFICANT CHANGE UP (ref 3.5–5.3)
POTASSIUM SERPL-SCNC: 4.4 MMOL/L — SIGNIFICANT CHANGE UP (ref 3.5–5.3)
RBC # BLD: 3.54 M/UL — LOW (ref 3.8–5.2)
RBC # FLD: 13.8 % — SIGNIFICANT CHANGE UP (ref 10.3–14.5)
SARS-COV-2 IGG+IGM SERPL QL IA: 204 U/ML — HIGH
SARS-COV-2 IGG+IGM SERPL QL IA: POSITIVE
SODIUM SERPL-SCNC: 136 MMOL/L — SIGNIFICANT CHANGE UP (ref 135–145)
WBC # BLD: 10.5 K/UL — SIGNIFICANT CHANGE UP (ref 3.8–10.5)
WBC # FLD AUTO: 10.5 K/UL — SIGNIFICANT CHANGE UP (ref 3.8–10.5)

## 2021-08-10 PROCEDURE — 85014 HEMATOCRIT: CPT

## 2021-08-10 PROCEDURE — U0005: CPT

## 2021-08-10 PROCEDURE — 82330 ASSAY OF CALCIUM: CPT

## 2021-08-10 PROCEDURE — 36430 TRANSFUSION BLD/BLD COMPNT: CPT

## 2021-08-10 PROCEDURE — 82607 VITAMIN B-12: CPT

## 2021-08-10 PROCEDURE — 96374 THER/PROPH/DIAG INJ IV PUSH: CPT

## 2021-08-10 PROCEDURE — 82746 ASSAY OF FOLIC ACID SERUM: CPT

## 2021-08-10 PROCEDURE — 86769 SARS-COV-2 COVID-19 ANTIBODY: CPT

## 2021-08-10 PROCEDURE — U0003: CPT

## 2021-08-10 PROCEDURE — 97116 GAIT TRAINING THERAPY: CPT

## 2021-08-10 PROCEDURE — 99291 CRITICAL CARE FIRST HOUR: CPT

## 2021-08-10 PROCEDURE — 74178 CT ABD&PLV WO CNTR FLWD CNTR: CPT | Mod: MG

## 2021-08-10 PROCEDURE — 80048 BASIC METABOLIC PNL TOTAL CA: CPT

## 2021-08-10 PROCEDURE — 84443 ASSAY THYROID STIM HORMONE: CPT

## 2021-08-10 PROCEDURE — 82565 ASSAY OF CREATININE: CPT

## 2021-08-10 PROCEDURE — 85025 COMPLETE CBC W/AUTO DIFF WBC: CPT

## 2021-08-10 PROCEDURE — 82803 BLOOD GASES ANY COMBINATION: CPT

## 2021-08-10 PROCEDURE — 85610 PROTHROMBIN TIME: CPT

## 2021-08-10 PROCEDURE — 86850 RBC ANTIBODY SCREEN: CPT

## 2021-08-10 PROCEDURE — 84295 ASSAY OF SERUM SODIUM: CPT

## 2021-08-10 PROCEDURE — 97530 THERAPEUTIC ACTIVITIES: CPT

## 2021-08-10 PROCEDURE — 84100 ASSAY OF PHOSPHORUS: CPT

## 2021-08-10 PROCEDURE — P9016: CPT

## 2021-08-10 PROCEDURE — 86900 BLOOD TYPING SEROLOGIC ABO: CPT

## 2021-08-10 PROCEDURE — 84132 ASSAY OF SERUM POTASSIUM: CPT

## 2021-08-10 PROCEDURE — 83735 ASSAY OF MAGNESIUM: CPT

## 2021-08-10 PROCEDURE — 86901 BLOOD TYPING SEROLOGIC RH(D): CPT

## 2021-08-10 PROCEDURE — G1004: CPT

## 2021-08-10 PROCEDURE — 83605 ASSAY OF LACTIC ACID: CPT

## 2021-08-10 PROCEDURE — 80053 COMPREHEN METABOLIC PANEL: CPT

## 2021-08-10 PROCEDURE — 85730 THROMBOPLASTIN TIME PARTIAL: CPT

## 2021-08-10 PROCEDURE — 82947 ASSAY GLUCOSE BLOOD QUANT: CPT

## 2021-08-10 PROCEDURE — 97161 PT EVAL LOW COMPLEX 20 MIN: CPT

## 2021-08-10 PROCEDURE — 86923 COMPATIBILITY TEST ELECTRIC: CPT

## 2021-08-10 PROCEDURE — 85027 COMPLETE CBC AUTOMATED: CPT

## 2021-08-10 PROCEDURE — 85018 HEMOGLOBIN: CPT

## 2021-08-10 PROCEDURE — 82435 ASSAY OF BLOOD CHLORIDE: CPT

## 2021-08-10 RX ADMIN — Medication 60 MILLIGRAM(S): at 06:05

## 2021-08-10 RX ADMIN — CITALOPRAM 20 MILLIGRAM(S): 10 TABLET, FILM COATED ORAL at 11:13

## 2021-08-10 RX ADMIN — GABAPENTIN 300 MILLIGRAM(S): 400 CAPSULE ORAL at 06:05

## 2021-08-10 RX ADMIN — LOSARTAN POTASSIUM 100 MILLIGRAM(S): 100 TABLET, FILM COATED ORAL at 17:48

## 2021-08-10 RX ADMIN — Medication 400 UNIT(S): at 11:13

## 2021-08-10 RX ADMIN — PANTOPRAZOLE SODIUM 40 MILLIGRAM(S): 20 TABLET, DELAYED RELEASE ORAL at 11:13

## 2021-08-10 RX ADMIN — Medication 5 MILLIGRAM(S): at 11:12

## 2021-08-10 RX ADMIN — GABAPENTIN 300 MILLIGRAM(S): 400 CAPSULE ORAL at 17:48

## 2021-08-10 RX ADMIN — Medication 7.5 MILLIGRAM(S): at 17:48

## 2021-08-10 RX ADMIN — Medication 1 TABLET(S): at 11:12

## 2021-08-10 RX ADMIN — Medication 7.5 MILLIGRAM(S): at 06:04

## 2021-08-10 RX ADMIN — PREGABALIN 1000 MICROGRAM(S): 225 CAPSULE ORAL at 11:13

## 2021-08-10 RX ADMIN — Medication 25 MILLIGRAM(S): at 06:05

## 2021-08-10 RX ADMIN — Medication 0.25 MILLIGRAM(S): at 17:48

## 2021-08-10 RX ADMIN — Medication 0.25 MILLIGRAM(S): at 06:05

## 2021-08-10 NOTE — PROGRESS NOTE ADULT - ASSESSMENT
89 yo female w/pmh anxiety, depression, HTN, GERD, very hard of hearing, presents to [facility] for cc bright red blood per rectum.    Gastrointestinal hemorrhage associated with anorectal source.   - might be diverticulosis, hemorrhoidal bleed  - hold off on GI consult - patient saying she does not want colonoscopy  - trend cbc daily  - transfuse to maintain hgb >7  - CT A/P no active bleed seen.     Other depression.  and anxiety  - cont buspirone, xanax, citalopram, and trazodone (home meds).     Essential hypertension.    -  holding antihypertensives for now - nifedipine, metoprolol, losartan due to relative hypotension.      Gastroesophageal reflux disease without esophagitis.    cont protonix - IV.     Osteopenia,   - cont vitamin D.      Prophylactic measure.  - no chemical dvt ppx due to GI bleeding  diet - soft   d/c planning
89 yo female w/pmh anxiety, depression, HTN, GERD, very hard of hearing, presents to [facility] for cc bright red blood per rectum.    Gastrointestinal hemorrhage associated with anorectal source.   - might be diverticulosis, hemorrhoidal bleed  - hold off on GI consult - patient saying she does not want colonoscopy  - trend cbc daily  - transfuse to maintain hgb >7  - CT A/P no active bleed seen.     Other depression.  and anxiety  - cont buspirone, xanax, citalopram, and trazodone (home meds).     Essential hypertension.    -  holding antihypertensives for now - nifedipine, metoprolol, losartan due to relative hypotension.      Gastroesophageal reflux disease without esophagitis.    cont protonix - IV.     Osteopenia,   - cont vitamin D.      Prophylactic measure.  - no chemical dvt ppx due to GI bleeding  diet - clear liquids, no acidic foods.

## 2021-08-10 NOTE — DISCHARGE NOTE NURSING/CASE MANAGEMENT/SOCIAL WORK - PATIENT PORTAL LINK FT
You can access the FollowMyHealth Patient Portal offered by [facility] by registering at the following website: [email or web address]. By joining [facility]’s FollowMyHealth portal, you will also be able to view your health information using other applications (apps) compatible with our system.

## 2021-08-10 NOTE — PROGRESS NOTE ADULT - SUBJECTIVE AND OBJECTIVE BOX
89 yo female w/pmh anxiety, depression, HTN, GERD, very hard of hearing, presents to [facility] for cc bright red blood per rectum. Per patient she usually has "1 drop" of blood whenever she has a BM but this morning blood was coming like it was "[place]" and her nursing home decided to send her to the hospital. Currently, she denies shortness of breath, chest pain, dizziness. No abdominal pain.     In the ER, patient was hemodynamically stable, with some SBPs in the 90s. CTA A/P obtained showed no e/o of brisk bleed. She was ordered for 1u prbc. Hgb 10.7. Patient says she does not want a colonoscopy because she's "too old for that now". She thinks she has never had a colonoscopy before either.  (08 Aug 2021 17:00)      PAST MEDICAL & SURGICAL HISTORY:  Pacemaker    Anxiety    Depression    Hypertension    GERD (gastroesophageal reflux disease)    Osteopenia    Insomnia    Spastic bladder    Vitamin B12 deficiency    Constipation    Paroxysmal atrial fibrillation    Anxiety    HTN (hypertension)    GERD (gastroesophageal reflux disease)    S/P hysterectomy        Review of Systems:   CONSTITUTIONAL: No fever, weight loss, or fatigue  EYES: No eye pain, visual disturbances, or discharge  ENMT:  No difficulty hearing, tinnitus, vertigo; No sinus or throat pain  NECK: No pain or stiffness  BREASTS: No pain, masses, or nipple discharge  RESPIRATORY: No cough, wheezing, chills or hemoptysis; No shortness of breath  CARDIOVASCULAR: No chest pain, palpitations, dizziness, or leg swelling  GASTROINTESTINAL: No abdominal or epigastric pain. No nausea, vomiting, or hematemesis; No diarrhea or constipation. No melena.  had BRBPR  GENITOURINARY: No dysuria, frequency, hematuria, or incontinence  NEUROLOGICAL: No headaches, memory loss, loss of strength, numbness, or tremors  SKIN: No itching, burning, rashes, or lesions   LYMPH NODES: No enlarged glands  ENDOCRINE: No heat or cold intolerance; No hair loss  MUSCULOSKELETAL: No joint pain or swelling; No muscle, back, or extremity pain  PSYCHIATRIC: No depression, anxiety, mood swings, or difficulty sleeping  HEME/LYMPH: No easy bruising, or bleeding gums  ALLERY AND IMMUNOLOGIC: No hives or eczema    Allergies    No Known Allergies    Intolerances        Social History:     FAMILY HISTORY:  No pertinent family history in first degree relatives        MEDICATIONS  (STANDING):  busPIRone 7.5 milliGRAM(s) Oral two times a day  calcium carbonate 1250 mG  + Vitamin D (OsCal 500 + D) 1 Tablet(s) Oral daily  cholecalciferol 400 Unit(s) Oral daily  citalopram 20 milliGRAM(s) Oral daily  clonazePAM Oral Disintegrating Tablet 0.25 milliGRAM(s) Oral two times a day  cyanocobalamin 1000 MICROGram(s) Oral daily  gabapentin 300 milliGRAM(s) Oral two times a day  multivitamin 1 Tablet(s) Oral daily  oxybutynin 5 milliGRAM(s) Oral daily  pantoprazole  Injectable 40 milliGRAM(s) IV Push daily  traZODone 50 milliGRAM(s) Oral at bedtime    MEDICATIONS  (PRN):  acetaminophen   Tablet .. 650 milliGRAM(s) Oral every 6 hours PRN Temp greater or equal to 38.5C (101.3F), Mild Pain (1 - 3)  aluminum hydroxide/magnesium hydroxide/simethicone Suspension 30 milliLiter(s) Oral every 4 hours PRN Dyspepsia  ondansetron Injectable 4 milliGRAM(s) IV Push every 8 hours PRN Nausea and/or Vomiting  zolpidem 5 milliGRAM(s) Oral at bedtime PRN Insomnia      Vital Signs Last 24 Hrs  T(C): 36.9 (09 Aug 2021 11:32), Max: 37.2 (09 Aug 2021 00:00)  T(F): 98.5 (09 Aug 2021 11:32), Max: 99 (09 Aug 2021 00:00)  HR: 54 (09 Aug 2021 11:32) (54 - 70)  BP: 174/77 (09 Aug 2021 11:32) (101/38 - 176/78)  BP(mean): 70 (08 Aug 2021 16:40) (70 - 70)  RR: 18 (09 Aug 2021 11:32) (15 - 18)  SpO2: 92% (09 Aug 2021 11:32) (88% - 96%)  CAPILLARY BLOOD GLUCOSE        I&O's Summary    08 Aug 2021 07:01  -  09 Aug 2021 07:00  --------------------------------------------------------  IN: 0 mL / OUT: 800 mL / NET: -800 mL    09 Aug 2021 07:01  -  09 Aug 2021 13:04  --------------------------------------------------------  IN: 240 mL / OUT: 300 mL / NET: -60 mL        PHYSICAL EXAM:  GENERAL: NAD, well-developed  HEAD:  Atraumatic, Normocephalic  EYES: EOMI, PERRLA, conjunctiva and sclera clear  NECK: Supple, No JVD  CHEST/LUNG: Clear to auscultation bilaterally; No wheeze  HEART: Regular rate and rhythm; No murmurs, rubs, or gallops  ABDOMEN: Soft, Nontender, Nondistended; Bowel sounds present  EXTREMITIES:  2+ Peripheral Pulses, No clubbing, cyanosis, or edema  PSYCH: AAOx3  NEUROLOGY: non-focal  SKIN: No rashes or lesions    LABS:                        10.5   9.37  )-----------( 222      ( 09 Aug 2021 00:06 )             31.9     08-09    139  |  104  |  17  ----------------------------<  103<H>  4.2   |  24  |  0.99    Ca    9.5      09 Aug 2021 00:06  Phos  4.1     08-09  Mg     2.2     08-09    TPro  7.7  /  Alb  4.4  /  TBili  0.3  /  DBili  x   /  AST  17  /  ALT  11  /  AlkPhos  81  08-08    PT/INR - ( 08 Aug 2021 10:00 )   PT: 12.0 sec;   INR: 1.00 ratio         PTT - ( 08 Aug 2021 10:00 )  PTT:29.8 sec          RADIOLOGY & ADDITIONAL TESTS:    Imaging Personally Reviewed:    Consultant(s) Notes Reviewed:      Care Discussed with Consultants/Other Providers:  
DATE OF SERVICE: 08-10-21 @ 13:08    Patient is a 89y old  Female who presents with a chief complaint of BRBPR (09 Aug 2021 17:47)      SUBJECTIVE / OVERNIGHT EVENTS:  No chest pain. No shortness of breath. No complaints. No events overnight.     MEDICATIONS  (STANDING):  busPIRone 7.5 milliGRAM(s) Oral two times a day  calcium carbonate 1250 mG  + Vitamin D (OsCal 500 + D) 1 Tablet(s) Oral daily  cholecalciferol 400 Unit(s) Oral daily  citalopram 20 milliGRAM(s) Oral daily  clonazePAM Oral Disintegrating Tablet 0.25 milliGRAM(s) Oral two times a day  cyanocobalamin 1000 MICROGram(s) Oral daily  gabapentin 300 milliGRAM(s) Oral two times a day  losartan 100 milliGRAM(s) Oral daily  metoprolol succinate ER 25 milliGRAM(s) Oral daily  multivitamin 1 Tablet(s) Oral daily  NIFEdipine XL 60 milliGRAM(s) Oral daily  oxybutynin 5 milliGRAM(s) Oral daily  pantoprazole  Injectable 40 milliGRAM(s) IV Push daily  traZODone 50 milliGRAM(s) Oral at bedtime    MEDICATIONS  (PRN):  acetaminophen   Tablet .. 650 milliGRAM(s) Oral every 6 hours PRN Temp greater or equal to 38.5C (101.3F), Mild Pain (1 - 3)  aluminum hydroxide/magnesium hydroxide/simethicone Suspension 30 milliLiter(s) Oral every 4 hours PRN Dyspepsia  ondansetron Injectable 4 milliGRAM(s) IV Push every 8 hours PRN Nausea and/or Vomiting  zolpidem 5 milliGRAM(s) Oral at bedtime PRN Insomnia      Vital Signs Last 24 Hrs  T(C): 36.7 (10 Aug 2021 11:30), Max: 36.9 (09 Aug 2021 20:11)  T(F): 98.1 (10 Aug 2021 11:30), Max: 98.5 (09 Aug 2021 20:11)  HR: 69 (10 Aug 2021 11:30) (54 - 73)  BP: 128/63 (10 Aug 2021 11:30) (128/63 - 208/83)  BP(mean): --  RR: 18 (10 Aug 2021 11:30) (18 - 18)  SpO2: 90% (10 Aug 2021 11:30) (90% - 93%)  CAPILLARY BLOOD GLUCOSE        I&O's Summary    09 Aug 2021 07:01  -  10 Aug 2021 07:00  --------------------------------------------------------  IN: 700 mL / OUT: 1240 mL / NET: -540 mL        PHYSICAL EXAM:  GENERAL: NAD, well-developed  HEAD:  Atraumatic, Normocephalic  EYES: EOMI, PERRLA, conjunctiva and sclera clear  NECK: Supple, No JVD  CHEST/LUNG: Clear to auscultation bilaterally; No wheeze  HEART: Regular rate and rhythm; No murmurs, rubs, or gallops  ABDOMEN: Soft, Nontender, Nondistended; Bowel sounds present  EXTREMITIES:  2+ Peripheral Pulses, No clubbing, cyanosis, or edema  PSYCH: AAOx3  NEUROLOGY: non-focal  SKIN: No rashes or lesions    LABS:                        10.9   10.50 )-----------( 236      ( 10 Aug 2021 06:13 )             33.4     08-10    136  |  101  |  18  ----------------------------<  95  4.4   |  24  |  1.04    Ca    9.3      10 Aug 2021 06:13  Phos  4.1     08-10  Mg     2.1     08-10                RADIOLOGY & ADDITIONAL TESTS:    Imaging Personally Reviewed:    Consultant(s) Notes Reviewed:      Care Discussed with Consultants/Other Providers:

## 2021-08-10 NOTE — DISCHARGE NOTE NURSING/CASE MANAGEMENT/SOCIAL WORK - NSDCVIVACCINE_GEN_ALL_CORE_FT
Tdap; 06-Jun-2020 23:35; Yesenia Mark); [employer]; [id number] (Exp. Date: 15-Aug-2021); IntraMuscular; Deltoid Left.; 0.5 milliLiter(s); VIS (VIS Published: 09-May-2013, VIS Presented: 06-Jun-2020);

## 2021-08-12 LAB
FOLATE SERPL-MCNC: >20 NG/ML — SIGNIFICANT CHANGE UP
TSH SERPL-MCNC: 2.08 UIU/ML — SIGNIFICANT CHANGE UP (ref 0.27–4.2)
VIT B12 SERPL-MCNC: 1728 PG/ML — HIGH (ref 232–1245)

## 2022-09-02 ENCOUNTER — EMERGENCY (EMERGENCY)
Facility: HOSPITAL | Age: 87
LOS: 1 days | Discharge: HOME | End: 2022-09-02
Attending: EMERGENCY MEDICINE
Payer: MEDICARE

## 2022-09-02 VITALS
WEIGHT: 130.07 LBS | SYSTOLIC BLOOD PRESSURE: 111 MMHG | OXYGEN SATURATION: 95 % | DIASTOLIC BLOOD PRESSURE: 54 MMHG | RESPIRATION RATE: 18 BRPM | TEMPERATURE: 98 F | HEIGHT: 62 IN | HEART RATE: 44 BPM

## 2022-09-02 VITALS
RESPIRATION RATE: 18 BRPM | TEMPERATURE: 98 F | OXYGEN SATURATION: 96 % | HEART RATE: 56 BPM | SYSTOLIC BLOOD PRESSURE: 170 MMHG | DIASTOLIC BLOOD PRESSURE: 78 MMHG

## 2022-09-02 DIAGNOSIS — Z90.710 ACQUIRED ABSENCE OF BOTH CERVIX AND UTERUS: Chronic | ICD-10-CM

## 2022-09-02 DIAGNOSIS — K56.2 VOLVULUS: Chronic | ICD-10-CM

## 2022-09-02 LAB
ALBUMIN SERPL ELPH-MCNC: 4.1 G/DL — SIGNIFICANT CHANGE UP (ref 3.3–5)
ALP SERPL-CCNC: 79 U/L — SIGNIFICANT CHANGE UP (ref 40–120)
ALT FLD-CCNC: 10 U/L — SIGNIFICANT CHANGE UP (ref 10–45)
ANION GAP SERPL CALC-SCNC: 14 MMOL/L — SIGNIFICANT CHANGE UP (ref 5–17)
AST SERPL-CCNC: 16 U/L — SIGNIFICANT CHANGE UP (ref 10–40)
BASOPHILS # BLD AUTO: 0.04 K/UL — SIGNIFICANT CHANGE UP (ref 0–0.2)
BASOPHILS NFR BLD AUTO: 0.5 % — SIGNIFICANT CHANGE UP (ref 0–2)
BILIRUB SERPL-MCNC: 0.2 MG/DL — SIGNIFICANT CHANGE UP (ref 0.2–1.2)
BUN SERPL-MCNC: 37 MG/DL — HIGH (ref 7–23)
CALCIUM SERPL-MCNC: 9.5 MG/DL — SIGNIFICANT CHANGE UP (ref 8.4–10.5)
CHLORIDE SERPL-SCNC: 103 MMOL/L — SIGNIFICANT CHANGE UP (ref 96–108)
CO2 SERPL-SCNC: 23 MMOL/L — SIGNIFICANT CHANGE UP (ref 22–31)
CREAT SERPL-MCNC: 1.32 MG/DL — HIGH (ref 0.5–1.3)
EGFR: 36 ML/MIN/1.73M2 — LOW
EOSINOPHIL # BLD AUTO: 0.21 K/UL — SIGNIFICANT CHANGE UP (ref 0–0.5)
EOSINOPHIL NFR BLD AUTO: 2.5 % — SIGNIFICANT CHANGE UP (ref 0–6)
GLUCOSE SERPL-MCNC: 102 MG/DL — HIGH (ref 70–99)
HCT VFR BLD CALC: 33.2 % — LOW (ref 34.5–45)
HGB BLD-MCNC: 10.4 G/DL — LOW (ref 11.5–15.5)
IMM GRANULOCYTES NFR BLD AUTO: 0.5 % — SIGNIFICANT CHANGE UP (ref 0–1.5)
LYMPHOCYTES # BLD AUTO: 0.99 K/UL — LOW (ref 1–3.3)
LYMPHOCYTES # BLD AUTO: 11.9 % — LOW (ref 13–44)
MCHC RBC-ENTMCNC: 30.2 PG — SIGNIFICANT CHANGE UP (ref 27–34)
MCHC RBC-ENTMCNC: 31.3 GM/DL — LOW (ref 32–36)
MCV RBC AUTO: 96.5 FL — SIGNIFICANT CHANGE UP (ref 80–100)
MONOCYTES # BLD AUTO: 0.68 K/UL — SIGNIFICANT CHANGE UP (ref 0–0.9)
MONOCYTES NFR BLD AUTO: 8.2 % — SIGNIFICANT CHANGE UP (ref 2–14)
NEUTROPHILS # BLD AUTO: 6.35 K/UL — SIGNIFICANT CHANGE UP (ref 1.8–7.4)
NEUTROPHILS NFR BLD AUTO: 76.4 % — SIGNIFICANT CHANGE UP (ref 43–77)
NRBC # BLD: 0 /100 WBCS — SIGNIFICANT CHANGE UP (ref 0–0)
PLATELET # BLD AUTO: 252 K/UL — SIGNIFICANT CHANGE UP (ref 150–400)
POTASSIUM SERPL-MCNC: 4.8 MMOL/L — SIGNIFICANT CHANGE UP (ref 3.5–5.3)
POTASSIUM SERPL-SCNC: 4.8 MMOL/L — SIGNIFICANT CHANGE UP (ref 3.5–5.3)
PROT SERPL-MCNC: 7.9 G/DL — SIGNIFICANT CHANGE UP (ref 6–8.3)
RBC # BLD: 3.44 M/UL — LOW (ref 3.8–5.2)
RBC # FLD: 13.2 % — SIGNIFICANT CHANGE UP (ref 10.3–14.5)
SODIUM SERPL-SCNC: 140 MMOL/L — SIGNIFICANT CHANGE UP (ref 135–145)
WBC # BLD: 8.31 K/UL — SIGNIFICANT CHANGE UP (ref 3.8–10.5)
WBC # FLD AUTO: 8.31 K/UL — SIGNIFICANT CHANGE UP (ref 3.8–10.5)

## 2022-09-02 PROCEDURE — 80053 COMPREHEN METABOLIC PANEL: CPT

## 2022-09-02 PROCEDURE — 36415 COLL VENOUS BLD VENIPUNCTURE: CPT

## 2022-09-02 PROCEDURE — 99284 EMERGENCY DEPT VISIT MOD MDM: CPT

## 2022-09-02 PROCEDURE — 85025 COMPLETE CBC W/AUTO DIFF WBC: CPT

## 2022-09-02 PROCEDURE — 99284 EMERGENCY DEPT VISIT MOD MDM: CPT | Mod: GC

## 2022-09-02 RX ORDER — CLONAZEPAM 1 MG
0.5 TABLET ORAL ONCE
Refills: 0 | Status: DISCONTINUED | OUTPATIENT
Start: 2022-09-02 | End: 2022-09-02

## 2022-09-02 RX ORDER — CHLORHEXIDINE GLUCONATE 213 G/1000ML
15 SOLUTION TOPICAL
Qty: 315 | Refills: 0
Start: 2022-09-02 | End: 2022-09-08

## 2022-09-02 RX ADMIN — Medication 0.5 MILLIGRAM(S): at 19:35

## 2022-09-02 NOTE — ED ADULT NURSE NOTE - NSIMPLEMENTINTERV_GEN_ALL_ED
Implemented All Fall with Harm Risk Interventions:  [place] to call system. Call bell, personal items and telephone within reach. Instruct patient to call for assistance. Room bathroom lighting operational. Non-slip footwear when patient is off stretcher. Physically safe environment: no spills, clutter or unnecessary equipment. Stretcher in lowest position, wheels locked, appropriate side rails in place. Provide visual cue, wrist band, yellow gown, etc. Monitor gait and stability. Monitor for mental status changes and reorient to person, place, and time. Review medications for side effects contributing to fall risk. Reinforce activity limits and safety measures with patient and family. Provide visual clues: red socks.

## 2022-09-02 NOTE — ED PROVIDER NOTE - OBJECTIVE STATEMENT
89 year old female sent in by [facility] for evaluation of purulent discharge from the mouth with concerns for a peritonsillar abscess. Nephew and PCP called. Nephew reports that 1 mo ago they noted yellow purulent material around the lips, she was taken to a dentist who said her bottom teeth all have poor dentition and need to be pulled. Pt deferred having her teeth removed, continued to have the purulent material around her mouth. Pt was c/o difficulty swallowing and difficulty swallowing last thursday. Per Dr. Salinas, pt was evaluated today by him, he reports he saw erythema of the left tonsil with drainage. Pt has been on amoxicillin since 8/29. Pt was noted by Dr. Salinas to be more drowsy than usual- this is not her baseline. Of note pt was recently tx for UTI. No reported fevers at nursing home. 89 year old female hx HTN, anxiety, depression [facility] discharge from the mouth with concerns for a peritonsillar abscess. Nephew and PCP called. Nephew reports that 1 mo ago they noted yellow purulent material around the lips, she was taken to a dentist who said her bottom teeth all have poor dentition and need to be pulled. Pt deferred having her teeth removed, continued to have the purulent material around her mouth. Pt was c/o difficulty swallowing and difficulty swallowing last thursday. Per Dr. Salinas, pt was evaluated today by him, he reports he saw erythema of the left tonsil with drainage. Pt has been on amoxicillin since 8/29. Pt was noted by Dr. Salinas to be more drowsy than usual- this is not her baseline. Of note pt was recently tx for UTI. No reported fevers at nursing home.

## 2022-09-02 NOTE — ED PROVIDER NOTE - PHYSICAL EXAMINATION
GENERAL: well appearing in no acute distress, non-toxic appearing, drowsy but arousable, sleeping throughout exam  HEAD: normocephalic, atraumatic  HEENT: normal conjunctiva, small pupils, dry oral mucosa with yellow caking around the lips, uvula midline, Mallampati 4, no visible tonsilar abscesses or exudates, poor dentition, no visible abscesses, neck supple, no LAD.   CARDIAC: bradycardic rate and reg rhythm, no appreciable murmurs, 2+ pulses in UE/LE b/l  PULM: normal breath sounds, clear to ascultation bilaterally, no rales, rhonchi, wheezing, audible gurgling   GI: abdomen nondistended, soft, nontender, no guarding, rebound tenderness  NEURO: drowsy but arousable, hard of hearing.   MSK: no peripheral edema, no calf tenderness b/l  SKIN: well-perfused, extremities warm, no visible rashes  PSYCH: appropriate mood and affect GENERAL: well appearing in no acute distress, non-toxic appearing, drowsy but arousable, sleeping throughout exam  HEAD: normocephalic, atraumatic  HEENT: normal conjunctiva, small but reactive pupils, dry oral mucosa with yellow caking around the lips, uvula midline, Mallampati 4, no visible tonsilar abscesses or exudates, poor dentition, no visible gum abscesses, neck supple, no LAD.   CARDIAC: bradycardic rate and reg rhythm, no appreciable murmurs, 2+ pulses in UE/LE b/l  PULM: normal breath sounds, clear to ascultation bilaterally, no rales, rhonchi, wheezing, audible gurgling   GI: abdomen nondistended, soft, nontender, no guarding, rebound tenderness  NEURO: drowsy but arousable, hard of hearing. Gag reflex diminished,   MSK: no peripheral edema,   SKIN: well-perfused, extremities warm, no visible rashes  PSYCH: appropriate mood and affect

## 2022-09-02 NOTE — ED PROVIDER NOTE - NSFOLLOWUPINSTRUCTIONS_ED_ALL_ED_FT
1) Follow up with your doctor within 1 week.   2) Return to the ED immediately for new or worsening symptoms, such as pain with swallowing, pain in throat, difficulty swallowing, fever.   3) Please continue to take any home medications as prescribed  4) Your test results from your ED visit were discussed with you prior to discharge  5) You were provided with a copy of your test results  6) Recommend using magic mouthwash and frequently rehydrate mouth to keep the gums clean and to remove the yellow 1) Follow up with your doctor within 1 week.   2) Return to the ED immediately for new or worsening symptoms, such as pain with swallowing, pain in throat, difficulty swallowing, fever.   3) Please continue to take any home medications as prescribed  4) Your test results from your ED visit were discussed with you prior to discharge  5) You were provided with a copy of your test results  6) Recommend using magic mouthwash and frequently rehydrate mouth to keep the gums clean and to remove the yellow  7) please finish the course of amoxicillin 500mg TID that she was taking before arriving to the ED.   8) Please use peridex 2tbsp  TID as needed, this was sent to your pharmacy

## 2022-09-02 NOTE — ED PROVIDER NOTE - PROGRESS NOTE DETAILS
Sara Melissa, PGY1, MD: pt now more awake, pt reassessed at bedside: pt states she does not have any pain anywhere and denies pain or trouble with swallowing. Pt reports she has a dry mouth but it does not bother her. pt does not know why she was brought to the emergency dept. states she feels fine and wants to go home.     pt okay to d/c home from ED standpoint, will call dr mathews and jax and give updates. Sara Melissa, PGY1, MD: 7:30pm, pt in distress while waiting for ambullette, pt calling out to nurses and crying hysterically because she wants to go home. pt told repeatedly that ambulette was on its way. Records sent from assisted living facility states pt takes Klonopin 0.5mg BID at 9am and 6pm, confirmed during prior conversation with Dr. Salinas, pt's PCP, that pt is on standing Klonopin daily and that she tolerates it well. Will give PM dose of Klonopin now.

## 2022-09-02 NOTE — ED PROVIDER NOTE - PATIENT PORTAL LINK FT
You can access the FollowMyHealth Patient Portal offered by [facility] by registering at the following website: [email or web address]. By joining [employer]’s FollowMyHealth portal, you will also be able to view your health information using other applications (apps) compatible with our system.

## 2022-09-02 NOTE — ED ADULT TRIAGE NOTE - CHIEF COMPLAINT QUOTE
"mouth pain" per EMS  [facility]"

## 2022-09-02 NOTE — ED PROVIDER NOTE - CLINICAL SUMMARY MEDICAL DECISION MAKING FREE TEXT BOX
there physical of evidence of a teritonsillar abscess. 89 year old female hx HTN, anxiety, depression sent in by [facility] for evaluation of purulent discharge from the mouth, has been on amoxicillin since 8/29, afebrile, bradycardic, otherwise hemodynamically appropriate. PCP was concerned for peritonsillar abscess, however, exam in the ED shows poor dentition, dry mucous membranes, with yellow caking around the mouth, without physical of evidence of a peritonsillar abscess on exam. No neck swelling, no lymphadenopathy, no erythema or appreciable abscesses.     Pt is very somnolent, pt on scheduled Clonopin, last dose .5 mg at  9am per nursing home records. Concern for benzo stacking, will observe in ED to see if she wakes up and reassess again once more awake.     Will check CBC for evidence of infection and CMP for metabolic derangements. UA to assess recovery of UTI. Most likely can send home with outpatient followup. 89 year old female hx HTN, anxiety, depression sent in by [facility] for evaluation of purulent discharge from the mouth, has been on amoxicillin since 8/29, afebrile, bradycardic, otherwise hemodynamically appropriate. PCP was concerned for peritonsillar abscess, however, exam in the ED shows poor dentition, dry mucous membranes, with yellow caking around the mouth, without physical of evidence of a peritonsillar abscess on exam. No neck swelling, no lymphadenopathy, no erythema or appreciable abscesses.   Pt is very somnolent, pt on scheduled Clonopin, last dose .5 mg at  9am per nursing home records. Concern for benzo stacking, will observe in ED to see if she wakes up and reassess again once more awake.   Will check CBC for evidence of infection and CMP for metabolic derangements. UA to assess recovery of UTI. Most likely can send home with outpatient followup. 89 year old female hx HTN, anxiety, depression sent in by [facility] for evaluation of purulent discharge from the mouth, has been on amoxicillin since 8/29, afebrile, bradycardic, otherwise hemodynamically appropriate. PCP was concerned for peritonsillar abscess, however, exam in the ED shows poor dentition, dry mucous membranes, with yellow caking around the mouth, without physical of evidence of a peritonsillar abscess on exam. No neck swelling, no lymphadenopathy, no erythema or appreciable abscesses.   Pt is very somnolent, pt on scheduled Clonopin, last dose .5 mg at  9am per [facility] records. Concern for benzo stacking, will observe in ED to see if she wakes up and reassess again once more awake.   Will check CBC for evidence of infection and CMP for metabolic derangements. UA to assess recovery of UTI. Most likely can send home with outpatient followup.

## 2022-09-02 NOTE — ED ADULT NURSE REASSESSMENT NOTE - NS ED NURSE REASSESS COMMENT FT1
Report received from previous RN Tika. Pt DC pending amb back to [facility], nonemergent. Pt noted to be crying I chair, stating she wants the ambulance to come. Consoled and reassured pt the arrival of ambulance soon. As per Dr. Melissa, will give pt night dose of Clonazepam for anxiety.

## 2022-09-02 NOTE — ED PROVIDER NOTE - ATTENDING CONTRIBUTION TO CARE
I, Arron Spangler, performed a history and physical exam of the patient and discussed their management with the resident and /or advanced care provider. I reviewed the resident and /or ACP's note and agree with the documented findings and plan of care. I was present and available for all procedures. Patient sent to ED for further evaluation of mouth discharge. Oropharynx examined thoroughly. Patient with poor dentition but without signs of peridental abscess or PTA. Patient somnolent and mouth breething with dry saliva caked on lips. Patient controls secretions while swallowing and positive gag reflex. However, appears patient is overly medicated dry saliva/mucous around lips.  Will await until pt becomes more arrousable and reassess.

## 2022-10-09 ENCOUNTER — EMERGENCY (EMERGENCY)
Facility: HOSPITAL | Age: 87
LOS: 1 days | Discharge: HOME | End: 2022-10-09
Attending: EMERGENCY MEDICINE
Payer: MEDICARE

## 2022-10-09 VITALS
RESPIRATION RATE: 20 BRPM | HEIGHT: 62 IN | SYSTOLIC BLOOD PRESSURE: 156 MMHG | DIASTOLIC BLOOD PRESSURE: 84 MMHG | TEMPERATURE: 98 F | HEART RATE: 60 BPM | OXYGEN SATURATION: 96 %

## 2022-10-09 DIAGNOSIS — K56.2 VOLVULUS: Chronic | ICD-10-CM

## 2022-10-09 DIAGNOSIS — Z90.710 ACQUIRED ABSENCE OF BOTH CERVIX AND UTERUS: Chronic | ICD-10-CM

## 2022-10-09 LAB
ALBUMIN SERPL ELPH-MCNC: 4.2 G/DL — SIGNIFICANT CHANGE UP (ref 3.3–5)
ALP SERPL-CCNC: 84 U/L — SIGNIFICANT CHANGE UP (ref 40–120)
ALT FLD-CCNC: 8 U/L — LOW (ref 10–45)
ANION GAP SERPL CALC-SCNC: 11 MMOL/L — SIGNIFICANT CHANGE UP (ref 5–17)
APPEARANCE UR: CLEAR — SIGNIFICANT CHANGE UP
AST SERPL-CCNC: 13 U/L — SIGNIFICANT CHANGE UP (ref 10–40)
BACTERIA # UR AUTO: NEGATIVE — SIGNIFICANT CHANGE UP
BASOPHILS # BLD AUTO: 0.04 K/UL — SIGNIFICANT CHANGE UP (ref 0–0.2)
BASOPHILS NFR BLD AUTO: 0.4 % — SIGNIFICANT CHANGE UP (ref 0–2)
BILIRUB SERPL-MCNC: 0.2 MG/DL — SIGNIFICANT CHANGE UP (ref 0.2–1.2)
BILIRUB UR-MCNC: NEGATIVE — SIGNIFICANT CHANGE UP
BUN SERPL-MCNC: 24 MG/DL — HIGH (ref 7–23)
CALCIUM SERPL-MCNC: 9.3 MG/DL — SIGNIFICANT CHANGE UP (ref 8.4–10.5)
CHLORIDE SERPL-SCNC: 103 MMOL/L — SIGNIFICANT CHANGE UP (ref 96–108)
CO2 SERPL-SCNC: 26 MMOL/L — SIGNIFICANT CHANGE UP (ref 22–31)
COLOR SPEC: YELLOW — SIGNIFICANT CHANGE UP
CREAT SERPL-MCNC: 1.43 MG/DL — HIGH (ref 0.5–1.3)
DIFF PNL FLD: NEGATIVE — SIGNIFICANT CHANGE UP
EGFR: 33 ML/MIN/1.73M2 — LOW
EOSINOPHIL # BLD AUTO: 0.22 K/UL — SIGNIFICANT CHANGE UP (ref 0–0.5)
EOSINOPHIL NFR BLD AUTO: 2.2 % — SIGNIFICANT CHANGE UP (ref 0–6)
EPI CELLS # UR: 0 /HPF — SIGNIFICANT CHANGE UP
GLUCOSE SERPL-MCNC: 109 MG/DL — HIGH (ref 70–99)
GLUCOSE UR QL: NEGATIVE — SIGNIFICANT CHANGE UP
HCT VFR BLD CALC: 30.2 % — LOW (ref 34.5–45)
HGB BLD-MCNC: 9.4 G/DL — LOW (ref 11.5–15.5)
HYALINE CASTS # UR AUTO: 4 /LPF — HIGH (ref 0–2)
IMM GRANULOCYTES NFR BLD AUTO: 0.5 % — SIGNIFICANT CHANGE UP (ref 0–0.9)
KETONES UR-MCNC: NEGATIVE — SIGNIFICANT CHANGE UP
LEUKOCYTE ESTERASE UR-ACNC: NEGATIVE — SIGNIFICANT CHANGE UP
LYMPHOCYTES # BLD AUTO: 1.25 K/UL — SIGNIFICANT CHANGE UP (ref 1–3.3)
LYMPHOCYTES # BLD AUTO: 12.4 % — LOW (ref 13–44)
MCHC RBC-ENTMCNC: 29.4 PG — SIGNIFICANT CHANGE UP (ref 27–34)
MCHC RBC-ENTMCNC: 31.1 GM/DL — LOW (ref 32–36)
MCV RBC AUTO: 94.4 FL — SIGNIFICANT CHANGE UP (ref 80–100)
MONOCYTES # BLD AUTO: 0.81 K/UL — SIGNIFICANT CHANGE UP (ref 0–0.9)
MONOCYTES NFR BLD AUTO: 8 % — SIGNIFICANT CHANGE UP (ref 2–14)
NEUTROPHILS # BLD AUTO: 7.73 K/UL — HIGH (ref 1.8–7.4)
NEUTROPHILS NFR BLD AUTO: 76.5 % — SIGNIFICANT CHANGE UP (ref 43–77)
NITRITE UR-MCNC: NEGATIVE — SIGNIFICANT CHANGE UP
NRBC # BLD: 0 /100 WBCS — SIGNIFICANT CHANGE UP (ref 0–0)
PH UR: 7 — SIGNIFICANT CHANGE UP (ref 5–8)
PLATELET # BLD AUTO: 308 K/UL — SIGNIFICANT CHANGE UP (ref 150–400)
POTASSIUM SERPL-MCNC: 4.8 MMOL/L — SIGNIFICANT CHANGE UP (ref 3.5–5.3)
POTASSIUM SERPL-SCNC: 4.8 MMOL/L — SIGNIFICANT CHANGE UP (ref 3.5–5.3)
PROT SERPL-MCNC: 7.7 G/DL — SIGNIFICANT CHANGE UP (ref 6–8.3)
PROT UR-MCNC: NEGATIVE — SIGNIFICANT CHANGE UP
RBC # BLD: 3.2 M/UL — LOW (ref 3.8–5.2)
RBC # FLD: 13.8 % — SIGNIFICANT CHANGE UP (ref 10.3–14.5)
RBC CASTS # UR COMP ASSIST: 1 /HPF — SIGNIFICANT CHANGE UP (ref 0–4)
SODIUM SERPL-SCNC: 140 MMOL/L — SIGNIFICANT CHANGE UP (ref 135–145)
SP GR SPEC: 1.01 — SIGNIFICANT CHANGE UP (ref 1.01–1.02)
UROBILINOGEN FLD QL: NEGATIVE — SIGNIFICANT CHANGE UP
WBC # BLD: 10.1 K/UL — SIGNIFICANT CHANGE UP (ref 3.8–10.5)
WBC # FLD AUTO: 10.1 K/UL — SIGNIFICANT CHANGE UP (ref 3.8–10.5)
WBC UR QL: 1 /HPF — SIGNIFICANT CHANGE UP (ref 0–5)

## 2022-10-09 PROCEDURE — 74176 CT ABD & PELVIS W/O CONTRAST: CPT | Mod: MA

## 2022-10-09 PROCEDURE — 99284 EMERGENCY DEPT VISIT MOD MDM: CPT | Mod: 25

## 2022-10-09 PROCEDURE — 36415 COLL VENOUS BLD VENIPUNCTURE: CPT

## 2022-10-09 PROCEDURE — 85025 COMPLETE CBC W/AUTO DIFF WBC: CPT

## 2022-10-09 PROCEDURE — 74176 CT ABD & PELVIS W/O CONTRAST: CPT | Mod: 26,MA

## 2022-10-09 PROCEDURE — 99284 EMERGENCY DEPT VISIT MOD MDM: CPT

## 2022-10-09 PROCEDURE — 80053 COMPREHEN METABOLIC PANEL: CPT

## 2022-10-09 PROCEDURE — 87086 URINE CULTURE/COLONY COUNT: CPT

## 2022-10-09 PROCEDURE — 81001 URINALYSIS AUTO W/SCOPE: CPT

## 2022-10-09 NOTE — ED PROVIDER NOTE - OBJECTIVE STATEMENT
89 year old female with a PMHx of HTN, anxiety, depression presenting with vaginal bleeding. Patient is a resident of the [facility], staff noticed vaginal spotting on 2 days ago that has persisted. Nephew was told to bring her to an OBGYN or the ED, because of the holiday weekend they brought the patient to the ED. Nephew states she has a history of UTIs. Denies fevers, cp, sob, abdominal pain, nausea, vomiting.

## 2022-10-09 NOTE — ED PROVIDER NOTE - CLINICAL SUMMARY MEDICAL DECISION MAKING FREE TEXT BOX
89 year old female with a PMHx of HTN, anxiety, depression presenting with vaginal spotting noticed by [facility] staff, patient has no acute complaints. Labs, UA, Uculture, CT abdomen pelvis.

## 2022-10-09 NOTE — ED ADULT NURSE REASSESSMENT NOTE - NS ED NURSE REASSESS COMMENT FT1
procedure explained to pt; straight cath for 600 cc clear bright colored yellow urine; tolerated procedure well; spec sent for ua and culture; removed pt pullup diaper and placed in new diaper; turned and repositioned; skin is intact.

## 2022-10-09 NOTE — ED PROVIDER NOTE - NSFOLLOWUPINSTRUCTIONS_ED_ALL_ED_FT
Please follow up with your primary care physician or OBGYN within the next 1-3 days.    You declined a pelvic exam to assess for vaginal bleeding. Imaging at the hospital does not explain the vaginal bleeding seen by your assisted living staff. No other sign of emergency medical condition.    Please return to the emergency department if you experience any of the following symptoms:    Fever  Chest pain  Difficulty breathing  Abdominal pain  Nausea  Vomiting

## 2022-10-09 NOTE — ED PROVIDER NOTE - ATTENDING CONTRIBUTION TO CARE
Patient is a 89 yo F with history of HTN, anxiety, depression, GERD, hx of afib s/p PPM, HARD of HEARING here from [place] for evaluation of vaginal bleeding that was noted 2 days ago. Nephew was at the bedside and spoke to Dr. Sanchez. Accordingly, they were advised to follow up with obgyn but because of the holiday weekend, he decided to bring patient in for evaluation. Patient also has a history of UTIs. Patient denies any pain. She denies chest pain, shortness of breath, abdominal pain.       VS noted  Gen. no acute distress, Non toxic, elderly  HEENT: EOMI, mmm  Lungs: CTAB/L no C/ W /R   CVS: RRR   Abd; Soft non tender, non distended   Ext: no edema  Skin: no rash  Neuro AAOx3 non focal clear speech  a/p: ? vaginal bleeding - discussed need for exam with patient who states she would prefer to not be checked at this time. She agrees with urinary catheterization - plan for labs, u/a, CT A/P.   - Neeraj IVEY

## 2022-10-09 NOTE — ED PROVIDER NOTE - PROGRESS NOTE DETAILS
Patient was offered pelvic exam and declined. Patient is A&Ox3, nephew at bedside conforms she has no history of dementia or altered mental status. Patient has the capacity to refuse this exam. DO Laura (PGY-2) Patient signed out to me by the prior attending.  The patient's disposition was discussed with the treating team and agreed upon.  Franck Sprague M.D. (attending) labs and imaging are not actionable, relayed to patient that without exam we cannot plan further for emergent management and treatment and that she should follow up with a gastroenterologist which she understands. The patient was serially evaluated throughout emergency department course by the team. There was no acute deterioration up to this time in the emergency department. The patient has demonstrated clinical improvement and/or stability, feels better at this time according to emergency department team. Agree with goals/plan of emergency department care as described in this physician's electronic medical record, including diagnostics, therapeutics and consultation recommendation as clinically warranted. Will discharge home with close outpatient follow up with primary care physician/provider and specialist if necessary. Patient's family educated on concerning signs and features to return to the emergency department, in layman terms, including but not limited to: nausea, vomiting, fever, chills, persistent/worsening symptoms or any concerns at all. There are no acute or immediate life threatening issues present on history, clinical exam, or any diagnostic evaluation. The patient is a safe disposition home, patient and/or family/guardian (with documents sent with patient) has capacity and insight into their condition, and will follow up with their doctor(s) this week. Patient and/or family/guardian (with documents sent with patient) understand anticipatory guidance and was given strict return and follow up precautions and return precautions for worsening of condition. The patient and/or family/guardian (with documents sent with patient) has been informed of all concerning signs and symptoms to return to Emergency Department, the necessity to follow up with the PMD/Clinic/follow up provided within 2-3 days was explained in written detail and left for family/guardian(s). The patient and/or family/guardian were given the opportunity to ask questions and have them answered in full. The patient and/or family/guardian are with capacity and insight into the situation, treatment, risks, benefits, alternative therapies, and understand that they can ask any further questions if needed. Patient and/or family/guardian understand anticipatory guidance were given strict return and follow up precautions.  The patient and/or family/guardian have been informed of all concerning signs and symptoms to return to Emergency Department, the necessity to follow up with the PMD/Clinic/follow up provided within 2-3 days was explained, and the patient and/or family reports understanding of above with capacity and insight. The patient and/or family/guardian were informed of any results of their tests and are were encouraged to follow up on the findings with their doctor as well as the need to inform their doctor of any results. The patient and/or family/guardian are aware of the need to follow up with repeat testing as applicable and report understanding of the above with capacity and insight. The patient and/or family/guardian was made aware of any pending test results at the time of discharge and of the need to call back for the final results as well as the need to inform their doctor of the results.

## 2022-10-09 NOTE — ED ADULT NURSE NOTE - CHIEF COMPLAINT QUOTE
pt with nephew arriving from [place] caregiver states some vaginal bleeding noted hx of uti's pt denies any pain

## 2022-10-09 NOTE — ED ADULT NURSE NOTE - OBJECTIVE STATEMENT
pt here with nephew from [facility] with vag bleed that started on Friday; pt is alert and oriented x3; skin warm and dry no acute distress; cllm; and cooperative pt here with nephew from [facility] with vag bleed that started on Friday; pt is alert and oriented x3; skin warm and dry no acute distress; cllm; and cooperative; pt is very hard of hearing; no vaginal bleeding noted at this time.

## 2022-10-09 NOTE — ED ADULT NURSE NOTE - FINAL NURSING ELECTRONIC SIGNATURE
Quality 111:Pneumonia Vaccination Status For Older Adults: Pneumococcal Vaccination Previously Received
Quality 226: Preventive Care And Screening: Tobacco Use: Screening And Cessation Intervention: Patient screened for tobacco and never smoked
Detail Level: Generalized
Quality 110: Preventive Care And Screening: Influenza Immunization: Influenza immunization was not ordered or administered, reason not given
10-Oct-2022 06:26

## 2022-10-09 NOTE — ED ADULT TRIAGE NOTE - CHIEF COMPLAINT QUOTE
pt with nephew arriving from [place] caregiver states some vaginal bleeding noted hx of uti's pt denies any pain

## 2022-10-10 VITALS
SYSTOLIC BLOOD PRESSURE: 193 MMHG | RESPIRATION RATE: 20 BRPM | TEMPERATURE: 98 F | DIASTOLIC BLOOD PRESSURE: 70 MMHG | OXYGEN SATURATION: 94 % | HEART RATE: 64 BPM

## 2022-10-11 LAB
CULTURE RESULTS: SIGNIFICANT CHANGE UP
SPECIMEN SOURCE: SIGNIFICANT CHANGE UP

## 2022-10-19 ENCOUNTER — INPATIENT (INPATIENT)
Facility: HOSPITAL | Age: 87
LOS: 16 days | Discharge: HOSPICE | DRG: 183 | End: 2022-11-05
Attending: FAMILY MEDICINE | Admitting: SURGERY
Payer: MEDICARE

## 2022-10-19 VITALS
HEIGHT: 62 IN | SYSTOLIC BLOOD PRESSURE: 96 MMHG | DIASTOLIC BLOOD PRESSURE: 60 MMHG | OXYGEN SATURATION: 99 % | HEART RATE: 52 BPM | RESPIRATION RATE: 18 BRPM

## 2022-10-19 DIAGNOSIS — K56.2 VOLVULUS: Chronic | ICD-10-CM

## 2022-10-19 DIAGNOSIS — Z90.710 ACQUIRED ABSENCE OF BOTH CERVIX AND UTERUS: Chronic | ICD-10-CM

## 2022-10-19 DIAGNOSIS — S22.39XA FRACTURE OF ONE RIB, UNSPECIFIED SIDE, INITIAL ENCOUNTER FOR CLOSED FRACTURE: ICD-10-CM

## 2022-10-19 LAB
ALBUMIN SERPL ELPH-MCNC: 3.8 G/DL — SIGNIFICANT CHANGE UP (ref 3.3–5)
ALP SERPL-CCNC: 82 U/L — SIGNIFICANT CHANGE UP (ref 40–120)
ALT FLD-CCNC: 10 U/L — SIGNIFICANT CHANGE UP (ref 10–45)
ANION GAP SERPL CALC-SCNC: 11 MMOL/L — SIGNIFICANT CHANGE UP (ref 5–17)
APPEARANCE UR: ABNORMAL
APTT BLD: 29.3 SEC — SIGNIFICANT CHANGE UP (ref 27.5–35.5)
AST SERPL-CCNC: 14 U/L — SIGNIFICANT CHANGE UP (ref 10–40)
BACTERIA # UR AUTO: ABNORMAL
BASE EXCESS BLDV CALC-SCNC: 1 MMOL/L — SIGNIFICANT CHANGE UP (ref -2–3)
BASOPHILS # BLD AUTO: 0.06 K/UL — SIGNIFICANT CHANGE UP (ref 0–0.2)
BASOPHILS NFR BLD AUTO: 0.7 % — SIGNIFICANT CHANGE UP (ref 0–2)
BILIRUB SERPL-MCNC: 0.3 MG/DL — SIGNIFICANT CHANGE UP (ref 0.2–1.2)
BILIRUB UR-MCNC: NEGATIVE — SIGNIFICANT CHANGE UP
BLD GP AB SCN SERPL QL: NEGATIVE — SIGNIFICANT CHANGE UP
BUN SERPL-MCNC: 34 MG/DL — HIGH (ref 7–23)
CA-I SERPL-SCNC: 1.22 MMOL/L — SIGNIFICANT CHANGE UP (ref 1.15–1.33)
CALCIUM SERPL-MCNC: 9.1 MG/DL — SIGNIFICANT CHANGE UP (ref 8.4–10.5)
CHLORIDE BLDV-SCNC: 107 MMOL/L — SIGNIFICANT CHANGE UP (ref 96–108)
CHLORIDE SERPL-SCNC: 105 MMOL/L — SIGNIFICANT CHANGE UP (ref 96–108)
CO2 BLDV-SCNC: 29 MMOL/L — HIGH (ref 22–26)
CO2 SERPL-SCNC: 24 MMOL/L — SIGNIFICANT CHANGE UP (ref 22–31)
COLOR SPEC: YELLOW — SIGNIFICANT CHANGE UP
CREAT SERPL-MCNC: 1.68 MG/DL — HIGH (ref 0.5–1.3)
DIFF PNL FLD: ABNORMAL
EGFR: 27 ML/MIN/1.73M2 — LOW
EOSINOPHIL # BLD AUTO: 0.27 K/UL — SIGNIFICANT CHANGE UP (ref 0–0.5)
EOSINOPHIL NFR BLD AUTO: 3.2 % — SIGNIFICANT CHANGE UP (ref 0–6)
EPI CELLS # UR: 1 /HPF — SIGNIFICANT CHANGE UP
GAS PNL BLDV: 140 MMOL/L — SIGNIFICANT CHANGE UP (ref 136–145)
GAS PNL BLDV: SIGNIFICANT CHANGE UP
GLUCOSE BLDV-MCNC: 91 MG/DL — SIGNIFICANT CHANGE UP (ref 70–99)
GLUCOSE SERPL-MCNC: 93 MG/DL — SIGNIFICANT CHANGE UP (ref 70–99)
GLUCOSE UR QL: NEGATIVE — SIGNIFICANT CHANGE UP
HCO3 BLDV-SCNC: 27 MMOL/L — SIGNIFICANT CHANGE UP (ref 22–29)
HCT VFR BLD CALC: 29 % — LOW (ref 34.5–45)
HCT VFR BLDA CALC: 27 % — LOW (ref 34.5–46.5)
HGB BLD CALC-MCNC: 9.1 G/DL — LOW (ref 11.7–16.1)
HGB BLD-MCNC: 8.8 G/DL — LOW (ref 11.5–15.5)
IMM GRANULOCYTES NFR BLD AUTO: 0.5 % — SIGNIFICANT CHANGE UP (ref 0–0.9)
INR BLD: 1.17 RATIO — HIGH (ref 0.88–1.16)
KETONES UR-MCNC: NEGATIVE — SIGNIFICANT CHANGE UP
LACTATE BLDV-MCNC: 0.9 MMOL/L — SIGNIFICANT CHANGE UP (ref 0.5–2)
LEUKOCYTE ESTERASE UR-ACNC: ABNORMAL
LYMPHOCYTES # BLD AUTO: 1.35 K/UL — SIGNIFICANT CHANGE UP (ref 1–3.3)
LYMPHOCYTES # BLD AUTO: 16.2 % — SIGNIFICANT CHANGE UP (ref 13–44)
MAGNESIUM SERPL-MCNC: 2.5 MG/DL — SIGNIFICANT CHANGE UP (ref 1.6–2.6)
MCHC RBC-ENTMCNC: 28.6 PG — SIGNIFICANT CHANGE UP (ref 27–34)
MCHC RBC-ENTMCNC: 30.3 GM/DL — LOW (ref 32–36)
MCV RBC AUTO: 94.2 FL — SIGNIFICANT CHANGE UP (ref 80–100)
MONOCYTES # BLD AUTO: 0.66 K/UL — SIGNIFICANT CHANGE UP (ref 0–0.9)
MONOCYTES NFR BLD AUTO: 7.9 % — SIGNIFICANT CHANGE UP (ref 2–14)
NEUTROPHILS # BLD AUTO: 5.97 K/UL — SIGNIFICANT CHANGE UP (ref 1.8–7.4)
NEUTROPHILS NFR BLD AUTO: 71.5 % — SIGNIFICANT CHANGE UP (ref 43–77)
NITRITE UR-MCNC: POSITIVE
NRBC # BLD: 0 /100 WBCS — SIGNIFICANT CHANGE UP (ref 0–0)
NT-PROBNP SERPL-SCNC: 1341 PG/ML — HIGH (ref 0–300)
PCO2 BLDV: 52 MMHG — HIGH (ref 39–42)
PH BLDV: 7.33 — SIGNIFICANT CHANGE UP (ref 7.32–7.43)
PH UR: 6 — SIGNIFICANT CHANGE UP (ref 5–8)
PHOSPHATE SERPL-MCNC: 4.1 MG/DL — SIGNIFICANT CHANGE UP (ref 2.5–4.5)
PLATELET # BLD AUTO: 346 K/UL — SIGNIFICANT CHANGE UP (ref 150–400)
PO2 BLDV: 50 MMHG — HIGH (ref 25–45)
POTASSIUM BLDV-SCNC: 5.1 MMOL/L — SIGNIFICANT CHANGE UP (ref 3.5–5.1)
POTASSIUM SERPL-MCNC: 5.1 MMOL/L — SIGNIFICANT CHANGE UP (ref 3.5–5.3)
POTASSIUM SERPL-SCNC: 5.1 MMOL/L — SIGNIFICANT CHANGE UP (ref 3.5–5.3)
PROT SERPL-MCNC: 7.7 G/DL — SIGNIFICANT CHANGE UP (ref 6–8.3)
PROT UR-MCNC: ABNORMAL
PROTHROM AB SERPL-ACNC: 13.6 SEC — HIGH (ref 10.5–13.4)
RAPID RVP RESULT: SIGNIFICANT CHANGE UP
RBC # BLD: 3.08 M/UL — LOW (ref 3.8–5.2)
RBC # FLD: 14 % — SIGNIFICANT CHANGE UP (ref 10.3–14.5)
RBC CASTS # UR COMP ASSIST: 1 /HPF — SIGNIFICANT CHANGE UP (ref 0–4)
RH IG SCN BLD-IMP: NEGATIVE — SIGNIFICANT CHANGE UP
SAO2 % BLDV: 80.1 % — SIGNIFICANT CHANGE UP (ref 67–88)
SARS-COV-2 RNA SPEC QL NAA+PROBE: SIGNIFICANT CHANGE UP
SODIUM SERPL-SCNC: 140 MMOL/L — SIGNIFICANT CHANGE UP (ref 135–145)
SP GR SPEC: 1.02 — SIGNIFICANT CHANGE UP (ref 1.01–1.02)
TROPONIN T, HIGH SENSITIVITY RESULT: 38 NG/L — SIGNIFICANT CHANGE UP (ref 0–51)
UROBILINOGEN FLD QL: NEGATIVE — SIGNIFICANT CHANGE UP
WBC # BLD: 8.35 K/UL — SIGNIFICANT CHANGE UP (ref 3.8–10.5)
WBC # FLD AUTO: 8.35 K/UL — SIGNIFICANT CHANGE UP (ref 3.8–10.5)
WBC UR QL: >50 /HPF — SIGNIFICANT CHANGE UP (ref 0–5)

## 2022-10-19 PROCEDURE — 99222 1ST HOSP IP/OBS MODERATE 55: CPT

## 2022-10-19 PROCEDURE — 71045 X-RAY EXAM CHEST 1 VIEW: CPT | Mod: 26

## 2022-10-19 PROCEDURE — 70450 CT HEAD/BRAIN W/O DYE: CPT | Mod: 26,MA

## 2022-10-19 PROCEDURE — 71275 CT ANGIOGRAPHY CHEST: CPT | Mod: 26,MA

## 2022-10-19 PROCEDURE — 99285 EMERGENCY DEPT VISIT HI MDM: CPT

## 2022-10-19 RX ORDER — LIDOCAINE 4 G/100G
1 CREAM TOPICAL ONCE
Refills: 0 | Status: COMPLETED | OUTPATIENT
Start: 2022-10-19 | End: 2022-10-19

## 2022-10-19 RX ORDER — CEFTRIAXONE 500 MG/1
1000 INJECTION, POWDER, FOR SOLUTION INTRAMUSCULAR; INTRAVENOUS ONCE
Refills: 0 | Status: COMPLETED | OUTPATIENT
Start: 2022-10-19 | End: 2022-10-19

## 2022-10-19 RX ORDER — SODIUM CHLORIDE 9 MG/ML
500 INJECTION INTRAMUSCULAR; INTRAVENOUS; SUBCUTANEOUS ONCE
Refills: 0 | Status: COMPLETED | OUTPATIENT
Start: 2022-10-19 | End: 2022-10-19

## 2022-10-19 RX ORDER — AZITHROMYCIN 500 MG/1
500 TABLET, FILM COATED ORAL ONCE
Refills: 0 | Status: COMPLETED | OUTPATIENT
Start: 2022-10-19 | End: 2022-10-19

## 2022-10-19 RX ADMIN — SODIUM CHLORIDE 500 MILLILITER(S): 9 INJECTION INTRAMUSCULAR; INTRAVENOUS; SUBCUTANEOUS at 16:20

## 2022-10-19 RX ADMIN — CEFTRIAXONE 100 MILLIGRAM(S): 500 INJECTION, POWDER, FOR SOLUTION INTRAMUSCULAR; INTRAVENOUS at 15:12

## 2022-10-19 RX ADMIN — LIDOCAINE 1 PATCH: 4 CREAM TOPICAL at 19:18

## 2022-10-19 RX ADMIN — AZITHROMYCIN 255 MILLIGRAM(S): 500 TABLET, FILM COATED ORAL at 19:18

## 2022-10-19 NOTE — H&P ADULT - HISTORY OF PRESENT ILLNESS
Patient is a 89 yo female with a PMHx of HTN, presents from [facility] where she was lethargic and weak since last night and hypoxic since this morning. Pt AAOx3 here though not too interactive. Pt comes on 5L NC sats mid 90s. Denies any pain anywhere, denies any fevers/chills, cough, chest pain, abd pain, n/v/d, urinary sxs    Patient seen and examined in ED. Hemodynamically stable, lethargic and sleepy but A&Ox3 when woken up. Denies chest pain, abdominal pain, any trauma or abuse. . No bruises or signs of trauma on physical exam.

## 2022-10-19 NOTE — ED PROVIDER NOTE - ATTENDING CONTRIBUTION TO CARE
hypoxic / fatigued  pt barely arouses  r chest wall ttp  labs / xr / consider cta if xr neg / uncl dx

## 2022-10-19 NOTE — H&P ADULT - NSHPLABSRESULTS_GEN_ALL_CORE
WBC Count: 8.35 K/uL (10-19-22 @ 14:43)   Hematocrit: 29.0 % (10-19-22 @ 14:43)   Creatinine, Serum: 1.68 mg/dL (10-19-22 @ 14:43)   ,Many,--,Large,Positive,>50,1

## 2022-10-19 NOTE — ED PROVIDER NOTE - PROGRESS NOTE DETAILS
Luke Med Tox Fellow: pts GFR noted, CT aware, will go ahead with CTA PE Luke Med Tox Fellow: pt with 3 L sided rib fractures, trauma notified and coming to see pt. Will await recs as pt may be SICU candidate Nephew at bedside, updated with results. States he is HCP for patient and patient is DNR/DNI. Will obtain MOLST with HCP, awaiting surgical consult. ERNESTINA Radha PGY2: patient will be admitted to SICU for rib fx and PNA.

## 2022-10-19 NOTE — ED PROVIDER NOTE - PHYSICAL EXAMINATION
GENERAL: pt in no distress though doesn't appear well overall  HEENT: normal conjunctiva, oral mucosa dry  CARDIAC: HR in 50s, BP with MAP 70s  PULM: grossly clear uppper lungs bilaterally though some decreased lung sounds bilaterally in lower lobes, pt hypoxic on RA sats 97% on 5L NC  GI: abdomen nondistended, soft, nontender  : no suprapubic tenderness  NEURO: alert and oriented x 3, normal speech, though not too interactive, pt without any focal neuro deficits, spenser 3mm bilat and reactive, eomi without nystagmus  MSK: no visible deformities, no peripheral edema, calf tenderness/redness/swelling  SKIN: no visible rashes  PSYCH: cooperative

## 2022-10-19 NOTE — ED ADULT NURSE REASSESSMENT NOTE - NS ED NURSE REASSESS COMMENT FT1
Trauma MD at bedside to evaluate pt. Trauma MD Attempted to teach pt. how to use incentive spirometer. Pt. unable to tolerate. Trauma MD aware.

## 2022-10-19 NOTE — H&P ADULT - TIME BILLING
I saw and evaluated patient and I agree with residents note  extreme of age  barely able to move due to pain and with poor effort on incentive spirometer  will need multimodal pain control  arranging for management in SICU

## 2022-10-19 NOTE — ED ADULT NURSE NOTE - OBJECTIVE STATEMENT
89 y/o F, with PMH of HTN, depression and neuralgia, presents to ED via EMS from [facility], c/o lethargy and decreased PO intake since yesterday, and hypoxia sating at 88% on RA. It was reported that she is active and walks with a walker but has not gotten up since yesterday. Upon assessment, pt. is lethargic but is easily aroused. pt. is on 3 L O2 NC and sating well. Pt. is bradycardic and pale. Eyes are dry. 89 y/o F, with PMH of HTN, depression and neuralgia, presents to ED via EMS from [facility], c/o lethargy and decreased PO intake since yesterday, and hypoxia sating at 88% on RA. . Pt. is a poor historian, is not able to verbalize why she is here. It was reported that she is active and walks with a walker but has not gotten up since yesterday. Upon assessment, pt. is lethargic and is minimally arousable.  pt. is on 3 L O2 NC and sating well. Pt. is bradycardic and pale. Mucous membranes and eyes are dry. Has stage 1 pressure injuries 89 y/o F, with PMH of HTN, depression and neuralgia, presents to ED via EMS from [facility], c/o lethargy and decreased PO intake since yesterday, and hypoxia sating at 88% on RA. . Pt. is a poor historian, is not able to verbalize why she is here. It was reported that she is active and walks with a walker but has not gotten up since yesterday. Upon assessment, pt. is lethargic and is minimally arousable.  pt. is on 3 L O2 NC and sating well. Pt. is bradycardic and pale. Mucous membranes and eyes are dry. pT. Has A stage 1 blanchable pressure injury on sacrum.

## 2022-10-19 NOTE — ED ADULT TRIAGE NOTE - CCCP TRG CHIEF CMPLNT
Target blood sugars ::  100-140 mg/dl.     Take Humalog  With meals . Take 10-15 min before the meals.    Take 3 units with breakfast and lunch   Take 5 units with supper.     Check blood sugars before meals and at bed time.     Call clinic if noticed blood sugars less than 90 mg/dl or higher than 200 mg/dl     Please see the diabetic educator in 1 weeks time.     Follow up with me in 8 weeks time.        hypoxia/weakness

## 2022-10-19 NOTE — H&P ADULT - ASSESSMENT
89 yo female with a PMHx of HTN, presents from [facility] where she was lethargic and weak since last night and hypoxic since this morning. Found to have Acute displaced fractures of the left fourth through sixth ribs.    Plan:    - Admit to surgery under Dr. Burgos  - SICU consult. Considering patients age and three displaced rib fractures, evaluate for admission to SICU  - No surgical intervention at this time  - Multimodal pain control     Patient seen and examined. Plan discussed with Dr. Loretta Levin MD, PGY2  [phone]

## 2022-10-19 NOTE — ED ADULT NURSE REASSESSMENT NOTE - NS ED NURSE REASSESS COMMENT FT1
Patient straight cathed for urine using sterile technique. Second RN present to confirm sterility. Explained procedure as it was being done - Pt tolerated procedure well. Sterile specimens collected and sent to lab as ordered. drained aprox 300mls of urine. Comfort and safety provided.

## 2022-10-19 NOTE — ED PROVIDER NOTE - CLINICAL SUMMARY MEDICAL DECISION MAKING FREE TEXT BOX
Luke Med Tox Fellow: pt lethargic and newly hypoxic, ddx broad and includes ACS/HF/PE/Infx/neoplastic. Will do labs, imaging, POCUS, admission. No need for NIPPV at this time.

## 2022-10-19 NOTE — ED PROVIDER NOTE - OBJECTIVE STATEMENT
89 year old female with a PMHx of HTN, presents from [facility] where she was lethargic and weak since last night and hypoxic since this morning. Pt AAOx3 here though not too interactive. Pt comes on 5L NC sats mid 90s. Denies any pain anywhere, denies any fevers/chills, cough, chest pain, abd pain, n/v/d, urinary sxs.

## 2022-10-19 NOTE — H&P ADULT - NSHPPHYSICALEXAM_GEN_ALL_CORE
PHYSICAL EXAM:  GENERAL: NAD, well-groomed, well-developed  HEAD:  Atraumatic, Normocephalic  EYES: EOMI, PERRLA, conjunctiva and sclera clear  NEUROLOGY: A&Ox3, nonfocal, moving all extremities  EXTREMITIES:  2+ Peripheral Pulses, No clubbing, cyanosis, or edema  SKIN: warm, dry, normal color, no rash or abnormal lesions

## 2022-10-20 LAB
ALBUMIN SERPL ELPH-MCNC: 3.3 G/DL — SIGNIFICANT CHANGE UP (ref 3.3–5)
ALP SERPL-CCNC: 75 U/L — SIGNIFICANT CHANGE UP (ref 40–120)
ALT FLD-CCNC: 8 U/L — LOW (ref 10–45)
ANION GAP SERPL CALC-SCNC: 11 MMOL/L — SIGNIFICANT CHANGE UP (ref 5–17)
APTT BLD: 29.7 SEC — SIGNIFICANT CHANGE UP (ref 27.5–35.5)
AST SERPL-CCNC: 11 U/L — SIGNIFICANT CHANGE UP (ref 10–40)
BASE EXCESS BLDV CALC-SCNC: 0.4 MMOL/L — SIGNIFICANT CHANGE UP (ref -2–3)
BASOPHILS # BLD AUTO: 0.05 K/UL — SIGNIFICANT CHANGE UP (ref 0–0.2)
BASOPHILS NFR BLD AUTO: 0.6 % — SIGNIFICANT CHANGE UP (ref 0–2)
BILIRUB SERPL-MCNC: 0.2 MG/DL — SIGNIFICANT CHANGE UP (ref 0.2–1.2)
BUN SERPL-MCNC: 29 MG/DL — HIGH (ref 7–23)
CA-I SERPL-SCNC: 1.22 MMOL/L — SIGNIFICANT CHANGE UP (ref 1.15–1.33)
CALCIUM SERPL-MCNC: 8.7 MG/DL — SIGNIFICANT CHANGE UP (ref 8.4–10.5)
CHLORIDE BLDV-SCNC: 107 MMOL/L — SIGNIFICANT CHANGE UP (ref 96–108)
CHLORIDE SERPL-SCNC: 106 MMOL/L — SIGNIFICANT CHANGE UP (ref 96–108)
CO2 BLDV-SCNC: 28 MMOL/L — HIGH (ref 22–26)
CO2 SERPL-SCNC: 23 MMOL/L — SIGNIFICANT CHANGE UP (ref 22–31)
CREAT SERPL-MCNC: 1.36 MG/DL — HIGH (ref 0.5–1.3)
EGFR: 35 ML/MIN/1.73M2 — LOW
EOSINOPHIL # BLD AUTO: 0.37 K/UL — SIGNIFICANT CHANGE UP (ref 0–0.5)
EOSINOPHIL NFR BLD AUTO: 4.5 % — SIGNIFICANT CHANGE UP (ref 0–6)
GAS PNL BLDV: 138 MMOL/L — SIGNIFICANT CHANGE UP (ref 136–145)
GAS PNL BLDV: SIGNIFICANT CHANGE UP
GLUCOSE BLDV-MCNC: 87 MG/DL — SIGNIFICANT CHANGE UP (ref 70–99)
GLUCOSE SERPL-MCNC: 94 MG/DL — SIGNIFICANT CHANGE UP (ref 70–99)
HCO3 BLDV-SCNC: 27 MMOL/L — SIGNIFICANT CHANGE UP (ref 22–29)
HCT VFR BLD CALC: 27.4 % — LOW (ref 34.5–45)
HCT VFR BLDA CALC: 26 % — LOW (ref 34.5–46.5)
HGB BLD CALC-MCNC: 8.6 G/DL — LOW (ref 11.7–16.1)
HGB BLD-MCNC: 8.3 G/DL — LOW (ref 11.5–15.5)
HOROWITZ INDEX BLDV+IHG-RTO: 32 — SIGNIFICANT CHANGE UP
IMM GRANULOCYTES NFR BLD AUTO: 0.2 % — SIGNIFICANT CHANGE UP (ref 0–0.9)
INR BLD: 1.18 RATIO — HIGH (ref 0.88–1.16)
LACTATE BLDV-MCNC: 0.5 MMOL/L — SIGNIFICANT CHANGE UP (ref 0.5–2)
LYMPHOCYTES # BLD AUTO: 1.24 K/UL — SIGNIFICANT CHANGE UP (ref 1–3.3)
LYMPHOCYTES # BLD AUTO: 15.2 % — SIGNIFICANT CHANGE UP (ref 13–44)
MAGNESIUM SERPL-MCNC: 2.3 MG/DL — SIGNIFICANT CHANGE UP (ref 1.6–2.6)
MCHC RBC-ENTMCNC: 28.9 PG — SIGNIFICANT CHANGE UP (ref 27–34)
MCHC RBC-ENTMCNC: 30.3 GM/DL — LOW (ref 32–36)
MCV RBC AUTO: 95.5 FL — SIGNIFICANT CHANGE UP (ref 80–100)
MONOCYTES # BLD AUTO: 0.85 K/UL — SIGNIFICANT CHANGE UP (ref 0–0.9)
MONOCYTES NFR BLD AUTO: 10.4 % — SIGNIFICANT CHANGE UP (ref 2–14)
NEUTROPHILS # BLD AUTO: 5.64 K/UL — SIGNIFICANT CHANGE UP (ref 1.8–7.4)
NEUTROPHILS NFR BLD AUTO: 69.1 % — SIGNIFICANT CHANGE UP (ref 43–77)
NRBC # BLD: 0 /100 WBCS — SIGNIFICANT CHANGE UP (ref 0–0)
PCO2 BLDV: 52 MMHG — HIGH (ref 39–42)
PH BLDV: 7.32 — SIGNIFICANT CHANGE UP (ref 7.32–7.43)
PHOSPHATE SERPL-MCNC: 3.8 MG/DL — SIGNIFICANT CHANGE UP (ref 2.5–4.5)
PLATELET # BLD AUTO: 308 K/UL — SIGNIFICANT CHANGE UP (ref 150–400)
PO2 BLDV: 39 MMHG — SIGNIFICANT CHANGE UP (ref 25–45)
POTASSIUM BLDV-SCNC: 4.5 MMOL/L — SIGNIFICANT CHANGE UP (ref 3.5–5.1)
POTASSIUM SERPL-MCNC: 4.5 MMOL/L — SIGNIFICANT CHANGE UP (ref 3.5–5.3)
POTASSIUM SERPL-SCNC: 4.5 MMOL/L — SIGNIFICANT CHANGE UP (ref 3.5–5.3)
PROCALCITONIN SERPL-MCNC: 0.09 NG/ML — SIGNIFICANT CHANGE UP (ref 0.02–0.1)
PROT SERPL-MCNC: 7 G/DL — SIGNIFICANT CHANGE UP (ref 6–8.3)
PROTHROM AB SERPL-ACNC: 13.7 SEC — HIGH (ref 10.5–13.4)
RBC # BLD: 2.87 M/UL — LOW (ref 3.8–5.2)
RBC # FLD: 14 % — SIGNIFICANT CHANGE UP (ref 10.3–14.5)
SAO2 % BLDV: 64.9 % — LOW (ref 67–88)
SODIUM SERPL-SCNC: 140 MMOL/L — SIGNIFICANT CHANGE UP (ref 135–145)
TSH SERPL-MCNC: 1.27 UIU/ML — SIGNIFICANT CHANGE UP (ref 0.27–4.2)
WBC # BLD: 8.17 K/UL — SIGNIFICANT CHANGE UP (ref 3.8–10.5)
WBC # FLD AUTO: 8.17 K/UL — SIGNIFICANT CHANGE UP (ref 3.8–10.5)

## 2022-10-20 PROCEDURE — 71045 X-RAY EXAM CHEST 1 VIEW: CPT | Mod: 26

## 2022-10-20 PROCEDURE — 93010 ELECTROCARDIOGRAM REPORT: CPT

## 2022-10-20 RX ORDER — CLONAZEPAM 1 MG
1 TABLET ORAL
Qty: 0 | Refills: 0 | DISCHARGE

## 2022-10-20 RX ORDER — CITALOPRAM 10 MG/1
20 TABLET, FILM COATED ORAL DAILY
Refills: 0 | Status: DISCONTINUED | OUTPATIENT
Start: 2022-10-20 | End: 2022-10-31

## 2022-10-20 RX ORDER — LIDOCAINE 4 G/100G
1 CREAM TOPICAL EVERY 24 HOURS
Refills: 0 | Status: DISCONTINUED | OUTPATIENT
Start: 2022-10-20 | End: 2022-11-05

## 2022-10-20 RX ORDER — CLONAZEPAM 1 MG
0.25 TABLET ORAL AT BEDTIME
Refills: 0 | Status: DISCONTINUED | OUTPATIENT
Start: 2022-10-20 | End: 2022-10-23

## 2022-10-20 RX ORDER — SODIUM CHLORIDE 9 MG/ML
1000 INJECTION, SOLUTION INTRAVENOUS
Refills: 0 | Status: DISCONTINUED | OUTPATIENT
Start: 2022-10-20 | End: 2022-10-21

## 2022-10-20 RX ORDER — HALOPERIDOL DECANOATE 100 MG/ML
2.5 INJECTION INTRAMUSCULAR ONCE
Refills: 0 | Status: COMPLETED | OUTPATIENT
Start: 2022-10-20 | End: 2022-10-20

## 2022-10-20 RX ORDER — ACETAMINOPHEN 500 MG
725 TABLET ORAL EVERY 6 HOURS
Refills: 0 | Status: COMPLETED | OUTPATIENT
Start: 2022-10-20 | End: 2022-10-20

## 2022-10-20 RX ORDER — HALOPERIDOL DECANOATE 100 MG/ML
2.5 INJECTION INTRAMUSCULAR ONCE
Refills: 0 | Status: DISCONTINUED | OUTPATIENT
Start: 2022-10-20 | End: 2022-10-20

## 2022-10-20 RX ORDER — ACETAMINOPHEN 500 MG
500 TABLET ORAL EVERY 6 HOURS
Refills: 0 | Status: DISCONTINUED | OUTPATIENT
Start: 2022-10-20 | End: 2022-10-20

## 2022-10-20 RX ORDER — CEFTRIAXONE 500 MG/1
1000 INJECTION, POWDER, FOR SOLUTION INTRAMUSCULAR; INTRAVENOUS EVERY 24 HOURS
Refills: 0 | Status: COMPLETED | OUTPATIENT
Start: 2022-10-20 | End: 2022-10-22

## 2022-10-20 RX ORDER — TRAZODONE HCL 50 MG
1 TABLET ORAL
Qty: 0 | Refills: 0 | DISCHARGE

## 2022-10-20 RX ORDER — LOPERAMIDE HCL 2 MG
1 TABLET ORAL
Qty: 0 | Refills: 0 | DISCHARGE

## 2022-10-20 RX ORDER — HALOPERIDOL DECANOATE 100 MG/ML
2 INJECTION INTRAMUSCULAR ONCE
Refills: 0 | Status: DISCONTINUED | OUTPATIENT
Start: 2022-10-20 | End: 2022-10-20

## 2022-10-20 RX ORDER — ACETAMINOPHEN 500 MG
650 TABLET ORAL EVERY 6 HOURS
Refills: 0 | Status: DISCONTINUED | OUTPATIENT
Start: 2022-10-20 | End: 2022-10-20

## 2022-10-20 RX ORDER — ACETAMINOPHEN 500 MG
500 TABLET ORAL EVERY 6 HOURS
Refills: 0 | Status: DISCONTINUED | OUTPATIENT
Start: 2022-10-20 | End: 2022-10-23

## 2022-10-20 RX ORDER — DEXMEDETOMIDINE HYDROCHLORIDE IN 0.9% SODIUM CHLORIDE 4 UG/ML
0.4 INJECTION INTRAVENOUS
Qty: 200 | Refills: 0 | Status: DISCONTINUED | OUTPATIENT
Start: 2022-10-20 | End: 2022-10-21

## 2022-10-20 RX ORDER — CITALOPRAM 10 MG/1
1 TABLET, FILM COATED ORAL
Qty: 0 | Refills: 0 | DISCHARGE

## 2022-10-20 RX ORDER — HEPARIN SODIUM 5000 [USP'U]/ML
5000 INJECTION INTRAVENOUS; SUBCUTANEOUS EVERY 12 HOURS
Refills: 0 | Status: DISCONTINUED | OUTPATIENT
Start: 2022-10-20 | End: 2022-10-27

## 2022-10-20 RX ORDER — METOPROLOL TARTRATE 50 MG
12.5 TABLET ORAL EVERY 12 HOURS
Refills: 0 | Status: DISCONTINUED | OUTPATIENT
Start: 2022-10-20 | End: 2022-10-25

## 2022-10-20 RX ORDER — HALOPERIDOL DECANOATE 100 MG/ML
1 INJECTION INTRAMUSCULAR
Refills: 0 | Status: DISCONTINUED | OUTPATIENT
Start: 2022-10-20 | End: 2022-10-23

## 2022-10-20 RX ORDER — TRAZODONE HCL 50 MG
100 TABLET ORAL AT BEDTIME
Refills: 0 | Status: DISCONTINUED | OUTPATIENT
Start: 2022-10-20 | End: 2022-10-31

## 2022-10-20 RX ADMIN — LIDOCAINE 1 PATCH: 4 CREAM TOPICAL at 07:28

## 2022-10-20 RX ADMIN — LIDOCAINE 1 PATCH: 4 CREAM TOPICAL at 06:08

## 2022-10-20 RX ADMIN — LIDOCAINE 1 PATCH: 4 CREAM TOPICAL at 00:49

## 2022-10-20 RX ADMIN — HALOPERIDOL DECANOATE 2.5 MILLIGRAM(S): 100 INJECTION INTRAMUSCULAR at 08:15

## 2022-10-20 RX ADMIN — HALOPERIDOL DECANOATE 2.5 MILLIGRAM(S): 100 INJECTION INTRAMUSCULAR at 18:45

## 2022-10-20 RX ADMIN — HALOPERIDOL DECANOATE 2.5 MILLIGRAM(S): 100 INJECTION INTRAMUSCULAR at 07:30

## 2022-10-20 RX ADMIN — SODIUM CHLORIDE 50 MILLILITER(S): 9 INJECTION, SOLUTION INTRAVENOUS at 20:53

## 2022-10-20 RX ADMIN — HEPARIN SODIUM 5000 UNIT(S): 5000 INJECTION INTRAVENOUS; SUBCUTANEOUS at 06:13

## 2022-10-20 RX ADMIN — CEFTRIAXONE 100 MILLIGRAM(S): 500 INJECTION, POWDER, FOR SOLUTION INTRAMUSCULAR; INTRAVENOUS at 00:49

## 2022-10-20 RX ADMIN — HALOPERIDOL DECANOATE 2.5 MILLIGRAM(S): 100 INJECTION INTRAMUSCULAR at 16:39

## 2022-10-20 RX ADMIN — LIDOCAINE 1 PATCH: 4 CREAM TOPICAL at 11:09

## 2022-10-20 RX ADMIN — HEPARIN SODIUM 5000 UNIT(S): 5000 INJECTION INTRAVENOUS; SUBCUTANEOUS at 17:40

## 2022-10-20 RX ADMIN — Medication 7.5 MILLIGRAM(S): at 23:36

## 2022-10-20 RX ADMIN — Medication 725 MILLIGRAM(S): at 03:42

## 2022-10-20 RX ADMIN — Medication 1 DROP(S): at 07:02

## 2022-10-20 RX ADMIN — Medication 0.25 MILLIGRAM(S): at 21:14

## 2022-10-20 RX ADMIN — HALOPERIDOL DECANOATE 1 MILLIGRAM(S): 100 INJECTION INTRAMUSCULAR at 15:53

## 2022-10-20 RX ADMIN — Medication 100 MILLIGRAM(S): at 21:03

## 2022-10-20 RX ADMIN — DEXMEDETOMIDINE HYDROCHLORIDE IN 0.9% SODIUM CHLORIDE 4.84 MICROGRAM(S)/KG/HR: 4 INJECTION INTRAVENOUS at 20:53

## 2022-10-20 RX ADMIN — Medication 290 MILLIGRAM(S): at 03:12

## 2022-10-20 NOTE — DISCHARGE NOTE PROVIDER - HOSPITAL COURSE
89 yo female with a PMHx of HTN, presents from [facility] where she was lethargic and weak since last night and hypoxic since this morning. Found to have Acute displaced fractures of the left fourth through sixth ribs. Patient admitted to the SICU. Patient started on antibiotics for UTI. PT/OT evaluated the patient recommending discharge to [facility]. Patient transferred out of the SICU on 10/21 to the surgical floor. The patient appeared lethargic while on the surgical floor and haldol and clonzaepam discontinued. Patient cleared for discharge to [facility]. 89 yo female with a PMHx of HTN, presents from [facility] where she was lethargic and weak since last night and hypoxic since this morning. Found to have Acute displaced fractures of the left fourth through sixth ribs. Patient admitted to the SICU. Treated for UTI, later found to have presumed aspiration PNA, currently on abx. Stay c/b bloody BMs, desaturations on NRB, swallowing dysfunction w/ failed SS eval. Pt w/ progressive lethargy and confusion, palliative consulted and GOC conversation held w/ HCP nephew Brendon Lopez. Agreed to comfort care measures given patient had expressed wishes for a peaceful and pain free passing in the past. Transferred to the PCU for symptom management and comfort care. For transfer to inpatient hospice.

## 2022-10-20 NOTE — DISCHARGE NOTE PROVIDER - NSDCMRMEDTOKEN_GEN_ALL_CORE_FT
Ambien 5 mg oral tablet: 1 tab(s) orally once a day (at bedtime), As Needed for insomnia  B 100 Complex oral tablet: 1 tab(s) orally once a day  busPIRone 7.5 mg oral tablet: 1 tab(s) orally 2 times a day  Calcium 600+D Plus Minerals oral tablet, chewable: 1 tab(s) orally once a day  citalopram 20 mg oral tablet: 1 tab(s) orally once a day  clonazePAM 0.5 mg oral tablet: 1 tab(s) orally 2 times a day  gabapentin 300 mg oral capsule: 1 cap(s) orally 2 times a day  losartan 50 mg oral tablet: 1 tab(s) orally once a day  Metoprolol Succinate ER 25 mg oral tablet, extended release: 1 tab(s) orally once a day  mirtazapine 15 mg oral tablet: 1 tab(s) orally once a day (at bedtime)  Multiple Vitamins oral tablet: 1 tab(s) orally once a day  NIFEdipine 60 mg oral tablet, extended release: 1 tab(s) orally once a day  oxybutynin 5 mg/24 hours oral tablet, extended release: 1 tab(s) orally once a day  pantoprazole 40 mg oral delayed release tablet: 1 tab(s) orally once a day  traZODone 100 mg oral tablet: 1  orally once a day (at bedtime)  Vitamin B12 1000 mcg oral tablet: 1 tab(s) orally once a day  Vitamin D3 400 intl units oral tablet: 1 tab(s) orally once a day   acetaminophen 325 mg oral tablet: 2 tab(s) orally every 6 hours, As needed, Mild Pain (1 - 3)  Ambien 5 mg oral tablet: 1 tab(s) orally once a day (at bedtime), As Needed for insomnia  B 100 Complex oral tablet: 1 tab(s) orally once a day  busPIRone 7.5 mg oral tablet: 1 tab(s) orally 2 times a day  Calcium 600+D Plus Minerals oral tablet, chewable: 1 tab(s) orally once a day  citalopram 20 mg oral tablet: 1 tab(s) orally once a day  clonazePAM 0.5 mg oral tablet: 1 tab(s) orally 2 times a day  gabapentin 300 mg oral capsule: 1 cap(s) orally 2 times a day  losartan 50 mg oral tablet: 1 tab(s) orally once a day  Metoprolol Succinate ER 25 mg oral tablet, extended release: 1 tab(s) orally once a day  mirtazapine 15 mg oral tablet: 1 tab(s) orally once a day (at bedtime)  Multiple Vitamins oral tablet: 1 tab(s) orally once a day  NIFEdipine 60 mg oral tablet, extended release: 1 tab(s) orally once a day  oxybutynin 5 mg/24 hours oral tablet, extended release: 1 tab(s) orally once a day  pantoprazole 40 mg oral delayed release tablet: 1 tab(s) orally once a day  traZODone 100 mg oral tablet: 1  orally once a day (at bedtime)  Vitamin B12 1000 mcg oral tablet: 1 tab(s) orally once a day  Vitamin D3 400 intl units oral tablet: 1 tab(s) orally once a day   acetaminophen 325 mg oral tablet: 2 tab(s) orally every 6 hours, As needed, Mild Pain (1 - 3)  Artificial Tears ophthalmic solution: 1 drop(s) to each affected eye 2 times a day  Ativan: 1 milligram(s) injectable every 6 hours  Ativan: 2 milligram(s) injectable every hour, As Needed  Dulcolax Stool Softener: 1 suppository(ies) rectal once a day, As Needed  HYDROmorphone: 0.5 milligram(s) intravenous every 6 hours  HYDROmorphone: 1 milligram(s) intravenous every hour, As Needed for dyspnea  HYDROmorphone: 0.5 milligram(s) intravenous every hour, As Needed  HYDROmorphone: 1 milligram(s) intravenous every hour, As Needed  lidocaine 4% patch: Apply topically to affected area once a day  Tylenol: 650 milligram(s) rectally every 6 hours, As Needed  Zofran: 4 milligram(s) injectable every 6 hours, As Needed

## 2022-10-20 NOTE — CHART NOTE - NSCHARTNOTEFT_GEN_A_CORE
Patient is a 89yo F admitted with lethargy, weakness. Found with rib fractures. Patient's medication list from the [facility] shows Ambien and clonazepam. iSTOP review completed, documented below. Ambien held for now. Will resume clonazepam 0.25 HS as per discussion with attending Dr. Lynne.    Rx Written	Rx Dispensed	Drug	                            Quantity	Days Supply	            Prescriber Name  09/13/2022	09/13/2022	zolpidem tartrate 5 mg tablet	30	30WiAmari yeh MD  09/04/2022	09/06/2022	clonazepam 0.5 mg tablet	60	30	Amari Roper MD  08/05/2022	08/05/2022	clonazepam 0.5 mg tablet	60	30	Amari Roper MD  07/13/2022	07/15/2022	zolpidem tartrate 5 mg tablet	30	30	Amari Roper MD  07/06/2022	07/06/2022	clonazepam 0.5 mg tablet	60	30	Amari Roper MD  06/07/2022	06/07/2022	zolpidem tartrate 5 mg tablet	30	30	Amari Roper MD  06/07/2022	06/07/2022	clonazepam 0.5 mg tablet	60	30	Amari Roper MD  12/17/2021	05/16/2022	zolpidem tartrate 5 mg tablet	30	30	Amari Roper MD  05/06/2022	05/09/2022	clonazepam 0.5 mg tablet	60	30	Amari Roper MD  12/17/2021	04/18/2022	zolpidem tartrate 5 mg tablet	30	30	Amari Roper MD  04/01/2022	04/08/2022	clonazepam 0.5 mg tablet	60	30	Amari Roper MD  12/17/2021	03/16/2022	zolpidem tartrate 5 mg tablet	30	30	Amari Roper MD  03/08/2022	03/08/2022	clonazepam 0.5 mg tablet	60	30	Amari Roper MD  12/17/2021	02/16/2022	zolpidem tartrate 5 mg tablet	30	30	Amari Roper MD  02/04/2022	02/07/2022	clonazepam 0.5 mg tablet	60	30	Amari Roper MD  12/17/2021	01/17/2022	zolpidem tartrate 5 mg tablet	30	30	Amari Roper MD  12/31/2021	01/08/2022	clonazepam 0.5 mg tablet	60	30	Amari Roper MD  01/04/2022	01/04/2022	tramadol hcl 50 mg tablet	21	7	Juan Luis Salinas MD  12/17/2021	12/21/2021	zolpidem tartrate 5 mg tablet	30	30	Amari Roper MD  12/03/2021	12/07/2021	clonazepam 0.5 mg tablet	60	30	Amari Roper MD  07/06/2021	11/18/2021	zolpidem tartrate 5 mg tablet	30	30	Amari Roper MD  11/12/2021	11/12/2021	clonazepam 0.5 mg tablet	60	30	Amari Roper MD  07/06/2021	10/21/2021	zolpidem tartrate 5 mg tablet	30	30	Amari Roper MD  * - Drugs marked with an asterisk are compound drugs. If the compound drug is made up of more than one controlled substance, then each controlled substance will be a separate row in the table.

## 2022-10-20 NOTE — DISCHARGE NOTE PROVIDER - NSDCCPCAREPLAN_GEN_ALL_CORE_FT
PRINCIPAL DISCHARGE DIAGNOSIS  Diagnosis: Rib fracture  Assessment and Plan of Treatment:       SECONDARY DISCHARGE DIAGNOSES  Diagnosis: Pneumonia  Assessment and Plan of Treatment:     Diagnosis: Abnormal finding on CT scan  Assessment and Plan of Treatment: Diffuse distention of mid to upper esophagus of unclear etiology found of CT. Esophagram is recommended for further evaluation of the esophageal lesion. Patient and family aware of findings and can followup with Dr. Juan Luis Salinas, PCP, for further workup as an outpatient.     PRINCIPAL DISCHARGE DIAGNOSIS  Diagnosis: Rib fracture  Assessment and Plan of Treatment: Please follow up with your Trauma Doctor only if needed at [address] [place] [id number].   Phone #[phone].  You may use Salanpas 1% lidocaine patches over the counter for topical pain relief.        SECONDARY DISCHARGE DIAGNOSES  Diagnosis: Pneumonia  Assessment and Plan of Treatment:     Diagnosis: Abnormal finding on CT scan  Assessment and Plan of Treatment: Diffuse distention of mid to upper esophagus of unclear etiology found of CT. Esophagram is recommended for further evaluation of the esophageal lesion. Patient and family aware of findings and can followup with Dr. JuanL uis Salinas, PCP, for further workup as an outpatient.     PRINCIPAL DISCHARGE DIAGNOSIS  Diagnosis: Functional quadriplegia  Assessment and Plan of Treatment:       SECONDARY DISCHARGE DIAGNOSES  Diagnosis: Pneumonia, aspiration  Assessment and Plan of Treatment:

## 2022-10-20 NOTE — CONSULT NOTE ADULT - SUBJECTIVE AND OBJECTIVE BOX
HISTORY OF PRESENT ILLNESS:  HPI:  Patient is a 89 yo female with a PMHx of HTN, presents from [facility] where she was lethargic and weak since last night and hypoxic since this morning. Pt AAOx3 here though not too interactive. Pt comes on 5L NC sats mid 90s. Titrated down to 2L NC in ED. Denies any pain anywhere, denies any fevers/chills, cough, chest pain, abd pain, n/v/d, urinary sxs    Patient seen and examined in ED. Hemodynamically stable, lethargic and sleepy but A&Ox3 when woken up. Denies chest pain, abdominal pain, any trauma or abuse. . No bruises or signs of trauma on physical exam.   (19 Oct 2022 21:50)      PAST MEDICAL HISTORY: Pacemaker    Anxiety    Depression    Hypertension    GERD (gastroesophageal reflux disease)    Osteopenia    Insomnia    Spastic bladder    Vitamin B12 deficiency    No pertinent past medical history    Constipation    Paroxysmal atrial fibrillation    Anxiety    HTN (hypertension)    GERD (gastroesophageal reflux disease)        PAST SURGICAL HISTORY: S/P hysterectomy    No significant past surgical history    Small bowel volvulus        FAMILY HISTORY: No pertinent family history    No pertinent family history in first degree relatives      ALLERGIES: No Known Allergies      VITAL SIGNS:  ICU Vital Signs Last 24 Hrs  T(C): 36.5 (19 Oct 2022 23:36), Max: 37.1 (19 Oct 2022 14:42)  T(F): 97.7 (19 Oct 2022 23:36), Max: 98.8 (19 Oct 2022 14:42)  HR: 48 (19 Oct 2022 23:36) (43 - 52)  BP: 148/64 (19 Oct 2022 23:36) (96/47 - 148/64)  BP(mean): 73 (19 Oct 2022 21:04) (73 - 73)  ABP: --  ABP(mean): --  RR: 16 (19 Oct 2022 23:36) (10 - 18)  SpO2: 98% (19 Oct 2022 23:36) (95% - 100%)    O2 Parameters below as of 19 Oct 2022 23:36  Patient On (Oxygen Delivery Method): nasal cannula  O2 Flow (L/min): 3      PHYSICAL EXAM:  Gen: NAD  Neuro: A&Ox3  Resp: no increased WOB, satting well on 2L, chest wall nontender to palpation, no ecchymosis noted  Cardiac: appears well perfused, extremities warm  Abd: soft, nondistended  Neuro. Moving all extremities. Sensation and strength intact throughout. No focal deficits.    NEURO  Meds:acetaminophen     Tablet .. 650 milliGRAM(s) Oral every 6 hours      RESPIRATORY    CARDIOVASCULAR  VBG - ( 19 Oct 2022 13:55 )  pH: 7.33  /  pCO2: 52    /  pO2: 50    / HCO3: 27    / Base Excess: 1.0   /  SaO2: 80.1   Lactate: 0.9        10-19    140  |  105  |  34<H>  ----------------------------<  93  5.1   |  24  |  1.68<H>    Ca    9.1      19 Oct 2022 14:43  Phos  4.1     10-19  Mg     2.5     10-19    TPro  7.7  /  Alb  3.8  /  TBili  0.3  /  DBili  x   /  AST  14  /  ALT  10  /  AlkPhos  82  10-19    [ ] Spangler catheter, indication: urine output monitoring in critically ill patient    HEMATOLOGIC  [ ] VTE Prophylaxis:  heparin   Injectable 5000 Unit(s) SubCutaneous every 12 hours                          8.8    8.35  )-----------( 346      ( 19 Oct 2022 14:43 )             29.0     PT/INR - ( 19 Oct 2022 14:44 )   PT: 13.6 sec;   INR: 1.17 ratio         PTT - ( 19 Oct 2022 14:44 )  PTT:29.3 sec  Transfusion: [ ] PRBC	[ ] Platelets	[ ] FFP	[ ] Cryoprecipitate      INFECTIOUS DISEASES  Meds:cefTRIAXone   IVPB 1000 milliGRAM(s) IV Intermittent every 24 hours          PATIENT CARE ACCESS DEVICES:  [ ] Peripheral IV  [ ] Central Venous Line	[ ] R	[ ] L	[ ] IJ	[ ] Fem	[ ] SC	Placed:   [ ] Arterial Line		[ ] R	[ ] L	[ ] Fem	[ ] Rad	[ ] Ax	Placed:   [ ] PICC:					[ ] Mediport  [ ] Urinary Catheter, Date Placed:   [x] Necessity of urinary, arterial, and venous catheters discussed    OTHER MEDICATIONS: artificial  tears Solution 1 Drop(s) Both EYES two times a day  lidocaine   4% Patch 1 Patch Transdermal every 24 hours

## 2022-10-20 NOTE — DIETITIAN INITIAL EVALUATION ADULT - REASON INDICATOR FOR ASSESSMENT
Nutrition Assessment warranted for length of stay on SICU  Information obtained from: medical record, communication with team.   Pt agitated/altered this morning; not appropriate for interview. Nutrition Assessment warranted for length of stay on SICU  Information obtained from: medical record, 8ICU Interdisciplinary Rounds   Pt extremely agitated/altered this morning; not appropriate for interview.

## 2022-10-20 NOTE — DIETITIAN INITIAL EVALUATION ADULT - REASON FOR ADMISSION
Fracture of one rib, unspecified side, initial encounter for closed fracture    Per chart: "89 F PMH HTN lives at the [facility] presenting to ED for lethargy and weakness, associated with hypoxia requiring 5L, eventually weaned to 2L in ED. Found to have left ribs 4-6 fractures and UTI admitted to SICU for rib score."

## 2022-10-20 NOTE — CONSULT NOTE ADULT - ASSESSMENT
89 F PMH HTN lives at the [facility] presenting to ED for lethargy and weakness, associated with hypoxia requiring 5L, eventually weaned to 2L in ED. Found to have left ribs 4-6 fractures and UTI admitted to SICU for rib score.    Neuro:  - AAOx3  - Multimodal pain control: acetaminophen and lido patch    Resp:  - L rib 4-6 fractures  - b/l pleural effusions and atelectasis  - IS, OOBTC, chest PT  - F/u AM cxr    CV:  - hemodynamically stable  - hold home antihypertensives pending med rec    GI:  - Regular diet  - Monitor bowel movements    Renal:  - Monitor I/O  - Elevated BUN/Cr, unknown baseline, continue to monitor  - Monitor electrolytes    ID:  - UTI  - s/p azithromycin and ceftriaxone in ED  - Continue ceftriaxone pending UA cultures  - blood cultures pending    Heme:  - DVT ppx with heparin subQ    Code Status: DNR/DNI    Dispo: SICU

## 2022-10-20 NOTE — CHART NOTE - NSCHARTNOTEFT_GEN_A_CORE
Incidental findings are findings on history, physical examination, lab work, and imaging that are not directly related to the patient's chief complaint and cause for hospital admission.     1. The incidental findings for this patient are (please list):    < from: CT Angio Chest PE Protocol w/ IV Cont (10.19.22 @ 18:04) >    Diffuse distention of the mid to upper esophagus is of unclear etiology.   Esophagram is recommended for further evaluation of an esophageal lesion.    < end of copied text >      2. Were these findings explained to the patient and/or their family (in the event that either the patient requests communication with their family or the patient is unable to understand or remember the findings and plan)?  Yes.       3. Was a copy of the lab work/ imaging report given to the patient and/or their family?  Yes, imaging report handed to patient and family.     4. Was the patient asked whether they can follow up with their PMD regarding this finding? If the patient says no, or does not have a PMD, you must identify a provider (i.e. Medicine Clinic or specialist) with whom the patient will follow up.  Yes, patient will follow-up with PMD.     5. Was the finding documented on the discharge summary?  Yes. Incidental findings are findings on history, physical examination, lab work, and imaging that are not directly related to the patient's chief complaint and cause for hospital admission.     1. The incidental findings for this patient are (please list):    < from: CT Angio Chest PE Protocol w/ IV Cont (10.19.22 @ 18:04) >    Diffuse distention of the mid to upper esophagus is of unclear etiology.   Esophagram is recommended for further evaluation of an esophageal lesion.    < end of copied text >      2. Were these findings explained to the patient and/or their family (in the event that either the patient requests communication with their family or the patient is unable to understand or remember the findings and plan)?  Yes, findings were explained to jax Elizondo.       3. Was a copy of the lab work/ imaging report given to the patient and/or their family?  Yes, CT report was handed to patient and nephew.       4. Was the patient asked whether they can follow up with their PMD regarding this finding? If the patient says no, or does not have a PMD, you must identify a provider (i.e. Medicine Clinic or specialist) with whom the patient will follow up.  Yes, patient will follow-up with PMD, Dr. Juan Luis Salinas for further workup.       5. Was the finding documented on the discharge summary?  Yes.

## 2022-10-20 NOTE — PROGRESS NOTE ADULT - SUBJECTIVE AND OBJECTIVE BOX
Trauma Surgery Progress Note  Patient is a 89y old  Female who presents with a chief complaint of Rib fracture, atelectasis, UTI (20 Oct 2022 00:35)      INTERVAL EVENTS: No acute events overnight.  SUBJECTIVE: Patient seen and examined at bedside with surgical team.    OBJECTIVE:    Vital Signs Last 24 Hrs  T(C): 36.5 (19 Oct 2022 23:36), Max: 37.1 (19 Oct 2022 14:42)  T(F): 97.7 (19 Oct 2022 23:36), Max: 98.8 (19 Oct 2022 14:42)  HR: 47 (20 Oct 2022 03:00) (43 - 52)  BP: 142/64 (20 Oct 2022 03:00) (96/47 - 158/68)  BP(mean): 92 (20 Oct 2022 03:00) (73 - 98)  RR: 12 (20 Oct 2022 03:00) (10 - 18)  SpO2: 97% (20 Oct 2022 03:00) (95% - 100%)    Parameters below as of 19 Oct 2022 23:36  Patient On (Oxygen Delivery Method): nasal cannula  O2 Flow (L/min): 3  I&O's Detail  MEDICATIONS  (STANDING):  artificial  tears Solution 1 Drop(s) Both EYES two times a day  cefTRIAXone   IVPB 1000 milliGRAM(s) IV Intermittent every 24 hours  heparin   Injectable 5000 Unit(s) SubCutaneous every 12 hours  lidocaine   4% Patch 1 Patch Transdermal every 24 hours    MEDICATIONS  (PRN):      PHYSICAL EXAM:  Constitutional: A&Ox3, NAD  Respiratory: Unlabored breathing  Abdomen: Soft, nondistended, NTTP. No rebound or guarding.  Extremities: WWP, SONG spontaneously    LABS:                        8.3    8.17  )-----------( 308      ( 20 Oct 2022 00:57 )             27.4     10-20    140  |  106  |  29<H>  ----------------------------<  94  4.5   |  23  |  1.36<H>    Ca    8.7      20 Oct 2022 00:57  Phos  3.8     10-20  Mg     2.3     10-20    TPro  7.0  /  Alb  3.3  /  TBili  0.2  /  DBili  x   /  AST  11  /  ALT  8<L>  /  AlkPhos  75  10-20    PT/INR - ( 20 Oct 2022 00:57 )   PT: 13.7 sec;   INR: 1.18 ratio         PTT - ( 20 Oct 2022 00:57 )  PTT:29.7 sec  LIVER FUNCTIONS - ( 20 Oct 2022 00:57 )  Alb: 3.3 g/dL / Pro: 7.0 g/dL / ALK PHOS: 75 U/L / ALT: 8 U/L / AST: 11 U/L / GGT: x           Urinalysis Basic - ( 19 Oct 2022 17:10 )    Color: Yellow / Appearance: Turbid / S.022 / pH: x  Gluc: x / Ketone: Negative  / Bili: Negative / Urobili: Negative   Blood: x / Protein: 30 mg/dL / Nitrite: Positive   Leuk Esterase: Large / RBC: 1 /hpf / WBC >50 /HPF   Sq Epi: x / Non Sq Epi: 1 /hpf / Bacteria: Many      ABO Interpretation: A (10-19-22 @ 14:46)      IMAGING:

## 2022-10-20 NOTE — DIETITIAN INITIAL EVALUATION ADULT - PERTINENT LABORATORY DATA
10-20    140  |  106  |  29<H>  ----------------------------<  94  4.5   |  23  |  1.36<H>    Ca    8.7      20 Oct 2022 00:57  Phos  3.8     10-20  Mg     2.3     10-20    TPro  7.0  /  Alb  3.3  /  TBili  0.2  /  DBili  x   /  AST  11  /  ALT  8<L>  /  AlkPhos  75  10-20

## 2022-10-20 NOTE — DISCHARGE NOTE PROVIDER - ATTENDING DISCHARGE PHYSICAL EXAMINATION:
GEN: lethargic, frail, comfortable appearing  HEENT: eyes closed, sclera anicteric  CVS: regular  PULM: normal work of breathing  GI: soft, ND  SKIN: warm and dry

## 2022-10-20 NOTE — DISCHARGE NOTE PROVIDER - PROVIDER TOKENS
PROVIDER:[TOKEN:[[id number]],FOLLOWUP:[Routine],ESTABLISHEDPATIENT:[T]] PROVIDER:[TOKEN:[[id number]],FOLLOWUP:[Routine],ESTABLISHEDPATIENT:[T]],PROVIDER:[TOKEN:[[id number]]]

## 2022-10-20 NOTE — DIETITIAN INITIAL EVALUATION ADULT - NS FNS WEIGHT CHANGE REASON
Fluctuating weight history per 2018 RD assessment and HIE:  2018: 43.6kg  2019: 45.4kg  2021: 49.9kg (1/15/21)  Current dosing wt 48.4kg is within past range/unintentional

## 2022-10-20 NOTE — DISCHARGE NOTE PROVIDER - NSDCFUADDAPPT_GEN_ALL_CORE_FT
Discussed diffused distention of mid to upper esophagus with patient nephew. Pt is to follow up with GI outpatient.

## 2022-10-20 NOTE — DIETITIAN INITIAL EVALUATION ADULT - PERTINENT MEDS FT
MEDICATIONS  (STANDING):  artificial  tears Solution 1 Drop(s) Both EYES two times a day  cefTRIAXone   IVPB 1000 milliGRAM(s) IV Intermittent every 24 hours  heparin   Injectable 5000 Unit(s) SubCutaneous every 12 hours  lidocaine   4% Patch 1 Patch Transdermal every 24 hours

## 2022-10-20 NOTE — DISCHARGE NOTE PROVIDER - CARE PROVIDER_API CALL
Juan Luis Salinas  INTERNAL MEDICINE  [facility] [place] [address]  Phone: [phone]  Fax: [phone]  Established Patient  Follow Up Time: Routine   Juan Luis Salinas  INTERNAL MEDICINE  [facility] [place] [address]  Phone: [phone]  Fax: [phone]  Established Patient  Follow Up Time: Routine    Alvin Valle)  Surgery  [address] [place] [address]  Phone: [phone]  Fax: [phone]  Follow Up Time:

## 2022-10-20 NOTE — PROGRESS NOTE ADULT - ASSESSMENT
89 yo female with a PMHx of HTN, presents from [facility] where she was lethargic and weak since last night and hypoxic since this morning. Found to have acute displaced fractures of the left fourth through sixth ribs.    Plan:    - Multimodal pain control   - DVT ppx  - regular diet  - rest of excellent care per SICU    ACS  [phone]

## 2022-10-20 NOTE — DIETITIAN INITIAL EVALUATION ADULT - NSFNSNUTRCHEWSWALLOWFT_GEN_A_CORE
Pt noted with h/o poor dentition and chewing/swallowing difficulty per 2018 admission. Unable to assess current status.

## 2022-10-21 LAB
-  AMIKACIN: SIGNIFICANT CHANGE UP
-  AMOXICILLIN/CLAVULANIC ACID: SIGNIFICANT CHANGE UP
-  AMPICILLIN/SULBACTAM: SIGNIFICANT CHANGE UP
-  AMPICILLIN: SIGNIFICANT CHANGE UP
-  AZTREONAM: SIGNIFICANT CHANGE UP
-  CEFAZOLIN: SIGNIFICANT CHANGE UP
-  CEFEPIME: SIGNIFICANT CHANGE UP
-  CEFOXITIN: SIGNIFICANT CHANGE UP
-  CEFTRIAXONE: SIGNIFICANT CHANGE UP
-  CIPROFLOXACIN: SIGNIFICANT CHANGE UP
-  ERTAPENEM: SIGNIFICANT CHANGE UP
-  GENTAMICIN: SIGNIFICANT CHANGE UP
-  IMIPENEM: SIGNIFICANT CHANGE UP
-  LEVOFLOXACIN: SIGNIFICANT CHANGE UP
-  MEROPENEM: SIGNIFICANT CHANGE UP
-  NITROFURANTOIN: SIGNIFICANT CHANGE UP
-  PIPERACILLIN/TAZOBACTAM: SIGNIFICANT CHANGE UP
-  TIGECYCLINE: SIGNIFICANT CHANGE UP
-  TOBRAMYCIN: SIGNIFICANT CHANGE UP
-  TRIMETHOPRIM/SULFAMETHOXAZOLE: SIGNIFICANT CHANGE UP
ALBUMIN SERPL ELPH-MCNC: 3.4 G/DL — SIGNIFICANT CHANGE UP (ref 3.3–5)
ALP SERPL-CCNC: 76 U/L — SIGNIFICANT CHANGE UP (ref 40–120)
ALT FLD-CCNC: 8 U/L — LOW (ref 10–45)
ANION GAP SERPL CALC-SCNC: 12 MMOL/L — SIGNIFICANT CHANGE UP (ref 5–17)
AST SERPL-CCNC: 15 U/L — SIGNIFICANT CHANGE UP (ref 10–40)
BASE EXCESS BLDV CALC-SCNC: -0.2 MMOL/L — SIGNIFICANT CHANGE UP (ref -2–3)
BASOPHILS # BLD AUTO: 0.04 K/UL — SIGNIFICANT CHANGE UP (ref 0–0.2)
BASOPHILS NFR BLD AUTO: 0.4 % — SIGNIFICANT CHANGE UP (ref 0–2)
BILIRUB SERPL-MCNC: 0.2 MG/DL — SIGNIFICANT CHANGE UP (ref 0.2–1.2)
BUN SERPL-MCNC: 27 MG/DL — HIGH (ref 7–23)
CA-I SERPL-SCNC: 1.24 MMOL/L — SIGNIFICANT CHANGE UP (ref 1.15–1.33)
CALCIUM SERPL-MCNC: 9.1 MG/DL — SIGNIFICANT CHANGE UP (ref 8.4–10.5)
CHLORIDE BLDV-SCNC: 108 MMOL/L — SIGNIFICANT CHANGE UP (ref 96–108)
CHLORIDE SERPL-SCNC: 107 MMOL/L — SIGNIFICANT CHANGE UP (ref 96–108)
CO2 BLDV-SCNC: 27 MMOL/L — HIGH (ref 22–26)
CO2 SERPL-SCNC: 23 MMOL/L — SIGNIFICANT CHANGE UP (ref 22–31)
CREAT SERPL-MCNC: 1.22 MG/DL — SIGNIFICANT CHANGE UP (ref 0.5–1.3)
CULTURE RESULTS: SIGNIFICANT CHANGE UP
EGFR: 40 ML/MIN/1.73M2 — LOW
EOSINOPHIL # BLD AUTO: 0.06 K/UL — SIGNIFICANT CHANGE UP (ref 0–0.5)
EOSINOPHIL NFR BLD AUTO: 0.6 % — SIGNIFICANT CHANGE UP (ref 0–6)
GAS PNL BLDV: 137 MMOL/L — SIGNIFICANT CHANGE UP (ref 136–145)
GAS PNL BLDV: SIGNIFICANT CHANGE UP
GAS PNL BLDV: SIGNIFICANT CHANGE UP
GLUCOSE BLDV-MCNC: 93 MG/DL — SIGNIFICANT CHANGE UP (ref 70–99)
GLUCOSE SERPL-MCNC: 95 MG/DL — SIGNIFICANT CHANGE UP (ref 70–99)
HCO3 BLDV-SCNC: 25 MMOL/L — SIGNIFICANT CHANGE UP (ref 22–29)
HCT VFR BLD CALC: 26.1 % — LOW (ref 34.5–45)
HCT VFR BLDA CALC: 25 % — LOW (ref 34.5–46.5)
HGB BLD CALC-MCNC: 8.3 G/DL — LOW (ref 11.7–16.1)
HGB BLD-MCNC: 8.1 G/DL — LOW (ref 11.5–15.5)
HOROWITZ INDEX BLDV+IHG-RTO: 28 — SIGNIFICANT CHANGE UP
IMM GRANULOCYTES NFR BLD AUTO: 0.4 % — SIGNIFICANT CHANGE UP (ref 0–0.9)
LACTATE BLDV-MCNC: 0.8 MMOL/L — SIGNIFICANT CHANGE UP (ref 0.5–2)
LYMPHOCYTES # BLD AUTO: 0.76 K/UL — LOW (ref 1–3.3)
LYMPHOCYTES # BLD AUTO: 7.2 % — LOW (ref 13–44)
MAGNESIUM SERPL-MCNC: 2.2 MG/DL — SIGNIFICANT CHANGE UP (ref 1.6–2.6)
MCHC RBC-ENTMCNC: 28.7 PG — SIGNIFICANT CHANGE UP (ref 27–34)
MCHC RBC-ENTMCNC: 31 GM/DL — LOW (ref 32–36)
MCV RBC AUTO: 92.6 FL — SIGNIFICANT CHANGE UP (ref 80–100)
METHOD TYPE: SIGNIFICANT CHANGE UP
MONOCYTES # BLD AUTO: 0.66 K/UL — SIGNIFICANT CHANGE UP (ref 0–0.9)
MONOCYTES NFR BLD AUTO: 6.3 % — SIGNIFICANT CHANGE UP (ref 2–14)
NEUTROPHILS # BLD AUTO: 8.96 K/UL — HIGH (ref 1.8–7.4)
NEUTROPHILS NFR BLD AUTO: 85.1 % — HIGH (ref 43–77)
NRBC # BLD: 0 /100 WBCS — SIGNIFICANT CHANGE UP (ref 0–0)
ORGANISM # SPEC MICROSCOPIC CNT: SIGNIFICANT CHANGE UP
ORGANISM # SPEC MICROSCOPIC CNT: SIGNIFICANT CHANGE UP
PCO2 BLDV: 45 MMHG — HIGH (ref 39–42)
PH BLDV: 7.36 — SIGNIFICANT CHANGE UP (ref 7.32–7.43)
PHOSPHATE SERPL-MCNC: 3.8 MG/DL — SIGNIFICANT CHANGE UP (ref 2.5–4.5)
PLATELET # BLD AUTO: 301 K/UL — SIGNIFICANT CHANGE UP (ref 150–400)
PO2 BLDV: 54 MMHG — HIGH (ref 25–45)
POTASSIUM BLDV-SCNC: 5 MMOL/L — SIGNIFICANT CHANGE UP (ref 3.5–5.1)
POTASSIUM SERPL-MCNC: 4.4 MMOL/L — SIGNIFICANT CHANGE UP (ref 3.5–5.3)
POTASSIUM SERPL-SCNC: 4.4 MMOL/L — SIGNIFICANT CHANGE UP (ref 3.5–5.3)
PROCALCITONIN SERPL-MCNC: 0.08 NG/ML — SIGNIFICANT CHANGE UP (ref 0.02–0.1)
PROT SERPL-MCNC: 7.1 G/DL — SIGNIFICANT CHANGE UP (ref 6–8.3)
RBC # BLD: 2.82 M/UL — LOW (ref 3.8–5.2)
RBC # FLD: 13.7 % — SIGNIFICANT CHANGE UP (ref 10.3–14.5)
SAO2 % BLDV: 86.7 % — SIGNIFICANT CHANGE UP (ref 67–88)
SODIUM SERPL-SCNC: 142 MMOL/L — SIGNIFICANT CHANGE UP (ref 135–145)
SPECIMEN SOURCE: SIGNIFICANT CHANGE UP
WBC # BLD: 10.52 K/UL — HIGH (ref 3.8–10.5)
WBC # FLD AUTO: 10.52 K/UL — HIGH (ref 3.8–10.5)

## 2022-10-21 PROCEDURE — 99232 SBSQ HOSP IP/OBS MODERATE 35: CPT | Mod: FS

## 2022-10-21 RX ORDER — SENNA PLUS 8.6 MG/1
2 TABLET ORAL AT BEDTIME
Refills: 0 | Status: DISCONTINUED | OUTPATIENT
Start: 2022-10-21 | End: 2022-10-31

## 2022-10-21 RX ORDER — POLYETHYLENE GLYCOL 3350 17 G/17G
17 POWDER, FOR SOLUTION ORAL DAILY
Refills: 0 | Status: DISCONTINUED | OUTPATIENT
Start: 2022-10-21 | End: 2022-10-31

## 2022-10-21 RX ORDER — KETOROLAC TROMETHAMINE 30 MG/ML
15 SYRINGE (ML) INJECTION ONCE
Refills: 0 | Status: DISCONTINUED | OUTPATIENT
Start: 2022-10-21 | End: 2022-10-21

## 2022-10-21 RX ORDER — METOPROLOL TARTRATE 50 MG
12.5 TABLET ORAL ONCE
Refills: 0 | Status: COMPLETED | OUTPATIENT
Start: 2022-10-21 | End: 2022-10-21

## 2022-10-21 RX ADMIN — HEPARIN SODIUM 5000 UNIT(S): 5000 INJECTION INTRAVENOUS; SUBCUTANEOUS at 06:32

## 2022-10-21 RX ADMIN — Medication 7.5 MILLIGRAM(S): at 13:50

## 2022-10-21 RX ADMIN — Medication 15 MILLIGRAM(S): at 02:55

## 2022-10-21 RX ADMIN — Medication 0.25 MILLIGRAM(S): at 23:17

## 2022-10-21 RX ADMIN — Medication 12.5 MILLIGRAM(S): at 23:17

## 2022-10-21 RX ADMIN — Medication 200 MILLIGRAM(S): at 02:37

## 2022-10-21 RX ADMIN — Medication 15 MILLIGRAM(S): at 03:25

## 2022-10-21 RX ADMIN — LIDOCAINE 1 PATCH: 4 CREAM TOPICAL at 12:00

## 2022-10-21 RX ADMIN — LIDOCAINE 1 PATCH: 4 CREAM TOPICAL at 00:21

## 2022-10-21 RX ADMIN — Medication 12.5 MILLIGRAM(S): at 01:22

## 2022-10-21 RX ADMIN — Medication 500 MILLIGRAM(S): at 02:55

## 2022-10-21 RX ADMIN — Medication 7.5 MILLIGRAM(S): at 23:16

## 2022-10-21 RX ADMIN — Medication 100 MILLIGRAM(S): at 23:17

## 2022-10-21 RX ADMIN — LIDOCAINE 1 PATCH: 4 CREAM TOPICAL at 07:53

## 2022-10-21 RX ADMIN — Medication 1 DROP(S): at 06:52

## 2022-10-21 RX ADMIN — SENNA PLUS 2 TABLET(S): 8.6 TABLET ORAL at 23:16

## 2022-10-21 RX ADMIN — Medication 12.5 MILLIGRAM(S): at 12:39

## 2022-10-21 RX ADMIN — CEFTRIAXONE 100 MILLIGRAM(S): 500 INJECTION, POWDER, FOR SOLUTION INTRAMUSCULAR; INTRAVENOUS at 00:20

## 2022-10-21 RX ADMIN — POLYETHYLENE GLYCOL 3350 17 GRAM(S): 17 POWDER, FOR SOLUTION ORAL at 12:39

## 2022-10-21 RX ADMIN — CITALOPRAM 20 MILLIGRAM(S): 10 TABLET, FILM COATED ORAL at 12:39

## 2022-10-21 RX ADMIN — HALOPERIDOL DECANOATE 1 MILLIGRAM(S): 100 INJECTION INTRAMUSCULAR at 13:46

## 2022-10-21 RX ADMIN — HEPARIN SODIUM 5000 UNIT(S): 5000 INJECTION INTRAVENOUS; SUBCUTANEOUS at 17:30

## 2022-10-21 NOTE — PROGRESS NOTE ADULT - SUBJECTIVE AND OBJECTIVE BOX
Trauma Surgery Progress Note  Patient is a 89y old  Female who presents with a chief complaint of Fracture of one rib, unspecified side, initial encounter for closed fracture    Per chart: "89 F PMH HTN lives at the [facility] presenting to ED for lethargy and weakness, associated with hypoxia requiring 5L, eventually weaned to 2L in ED. Found to have left ribs 4-6 fractures and UTI admitted to SICU for rib score."     (20 Oct 2022 07:56)      INTERVAL EVENTS: No acute events overnight.  SUBJECTIVE: Patient seen and examined at bedside with surgical team.    OBJECTIVE:    Vital Signs Last 24 Hrs  T(C): 36.8 (21 Oct 2022 03:00), Max: 37.4 (20 Oct 2022 19:00)  T(F): 98.2 (21 Oct 2022 03:00), Max: 99.4 (20 Oct 2022 19:00)  HR: 44 (21 Oct 2022 03:00) (41 - 100)  BP: 166/68 (21 Oct 2022 03:00) (111/80 - 191/83)  BP(mean): 95 (21 Oct 2022 03:00) (81 - 119)  RR: 15 (21 Oct 2022 03:00) (12 - 27)  SpO2: 93% (21 Oct 2022 03:00) (90% - 100%)    Parameters below as of 21 Oct 2022 03:00  Patient On (Oxygen Delivery Method): nasal cannula  O2 Flow (L/min): 2  I&O's Detail    19 Oct 2022 07:01  -  20 Oct 2022 07:00  --------------------------------------------------------  IN:    IV PiggyBack: 72.5 mL  Total IN: 72.5 mL    OUT:  Total OUT: 0 mL    Total NET: 72.5 mL      20 Oct 2022 07:01  -  21 Oct 2022 03:52  --------------------------------------------------------  IN:    Dexmedetomidine: 18.2 mL    IV PiggyBack: 100 mL    Lactated Ringers: 500 mL  Total IN: 618.2 mL    OUT:    Intermittent Catheterization - Urethral (mL): 450 mL    Voided (mL): 200 mL  Total OUT: 650 mL    Total NET: -31.9 mL      MEDICATIONS  (STANDING):  artificial  tears Solution 1 Drop(s) Both EYES two times a day  busPIRone 7.5 milliGRAM(s) Oral every 12 hours  cefTRIAXone   IVPB 1000 milliGRAM(s) IV Intermittent every 24 hours  citalopram 20 milliGRAM(s) Oral daily  clonazePAM  Tablet 0.25 milliGRAM(s) Oral at bedtime  dexMEDEtomidine Infusion 0.4 MICROgram(s)/kG/Hr (4.84 mL/Hr) IV Continuous <Continuous>  heparin   Injectable 5000 Unit(s) SubCutaneous every 12 hours  lactated ringers. 1000 milliLiter(s) (50 mL/Hr) IV Continuous <Continuous>  lidocaine   4% Patch 1 Patch Transdermal every 24 hours  metoprolol tartrate 12.5 milliGRAM(s) Oral every 12 hours  traZODone 100 milliGRAM(s) Oral at bedtime    MEDICATIONS  (PRN):  acetaminophen   IVPB .. 500 milliGRAM(s) IV Intermittent every 6 hours PRN Mild Pain (1 - 3)  haloperidol    Injectable 1 milliGRAM(s) IV Push two times a day PRN Agitation      PHYSICAL EXAM:  Constitutional: A&Ox3, NAD  Respiratory: Unlabored breathing  Abdomen: Soft, nondistended, NTTP. No rebound or guarding.  Extremities: WWP, SONG spontaneously    LABS:                        8.1    10.52 )-----------( 301      ( 21 Oct 2022 00:31 )             26.1     10-    142  |  107  |  27<H>  ----------------------------<  95  4.4   |  23  |  1.22    Ca    9.1      21 Oct 2022 00:31  Phos  3.8     10-  Mg     2.2     10-    TPro  7.1  /  Alb  3.4  /  TBili  0.2  /  DBili  x   /  AST  15  /  ALT  8<L>  /  AlkPhos  76  10-    PT/INR - ( 20 Oct 2022 00:57 )   PT: 13.7 sec;   INR: 1.18 ratio         PTT - ( 20 Oct 2022 00:57 )  PTT:29.7 sec  LIVER FUNCTIONS - ( 21 Oct 2022 00:31 )  Alb: 3.4 g/dL / Pro: 7.1 g/dL / ALK PHOS: 76 U/L / ALT: 8 U/L / AST: 15 U/L / GGT: x           Urinalysis Basic - ( 19 Oct 2022 17:10 )    Color: Yellow / Appearance: Turbid / S.022 / pH: x  Gluc: x / Ketone: Negative  / Bili: Negative / Urobili: Negative   Blood: x / Protein: 30 mg/dL / Nitrite: Positive   Leuk Esterase: Large / RBC: 1 /hpf / WBC >50 /HPF   Sq Epi: x / Non Sq Epi: 1 /hpf / Bacteria: Many          IMAGING:     Trauma Surgery Progress Note  Patient is a 89y old  Female who presents with a chief complaint of Fracture of one rib, unspecified side, initial encounter for closed fracture    Per chart: "89 F PMH HTN lives at the [facility] presenting to ED for lethargy and weakness, associated with hypoxia requiring 5L, eventually weaned to 2L in ED. Found to have left ribs 4-6 fractures and UTI admitted to SICU for rib score."     (20 Oct 2022 07:56)      INTERVAL EVENTS: Precedex started  SUBJECTIVE: Patient seen and examined at bedside with surgical team.    OBJECTIVE:    Vital Signs Last 24 Hrs  T(C): 36.8 (21 Oct 2022 03:00), Max: 37.4 (20 Oct 2022 19:00)  T(F): 98.2 (21 Oct 2022 03:00), Max: 99.4 (20 Oct 2022 19:00)  HR: 44 (21 Oct 2022 03:00) (41 - 100)  BP: 166/68 (21 Oct 2022 03:00) (111/80 - 191/83)  BP(mean): 95 (21 Oct 2022 03:00) (81 - 119)  RR: 15 (21 Oct 2022 03:00) (12 - 27)  SpO2: 93% (21 Oct 2022 03:00) (90% - 100%)    Parameters below as of 21 Oct 2022 03:00  Patient On (Oxygen Delivery Method): nasal cannula  O2 Flow (L/min): 2  I&O's Detail    19 Oct 2022 07:01  -  20 Oct 2022 07:00  --------------------------------------------------------  IN:    IV PiggyBack: 72.5 mL  Total IN: 72.5 mL    OUT:  Total OUT: 0 mL    Total NET: 72.5 mL      20 Oct 2022 07:01  -  21 Oct 2022 03:52  --------------------------------------------------------  IN:    Dexmedetomidine: 18.2 mL    IV PiggyBack: 100 mL    Lactated Ringers: 500 mL  Total IN: 618.2 mL    OUT:    Intermittent Catheterization - Urethral (mL): 450 mL    Voided (mL): 200 mL  Total OUT: 650 mL    Total NET: -31.9 mL      MEDICATIONS  (STANDING):  artificial  tears Solution 1 Drop(s) Both EYES two times a day  busPIRone 7.5 milliGRAM(s) Oral every 12 hours  cefTRIAXone   IVPB 1000 milliGRAM(s) IV Intermittent every 24 hours  citalopram 20 milliGRAM(s) Oral daily  clonazePAM  Tablet 0.25 milliGRAM(s) Oral at bedtime  dexMEDEtomidine Infusion 0.4 MICROgram(s)/kG/Hr (4.84 mL/Hr) IV Continuous <Continuous>  heparin   Injectable 5000 Unit(s) SubCutaneous every 12 hours  lactated ringers. 1000 milliLiter(s) (50 mL/Hr) IV Continuous <Continuous>  lidocaine   4% Patch 1 Patch Transdermal every 24 hours  metoprolol tartrate 12.5 milliGRAM(s) Oral every 12 hours  traZODone 100 milliGRAM(s) Oral at bedtime    MEDICATIONS  (PRN):  acetaminophen   IVPB .. 500 milliGRAM(s) IV Intermittent every 6 hours PRN Mild Pain (1 - 3)  haloperidol    Injectable 1 milliGRAM(s) IV Push two times a day PRN Agitation      PHYSICAL EXAM:  Constitutional: A&Ox3, NAD  Respiratory: Unlabored breathing  Abdomen: Soft, nondistended, NTTP. No rebound or guarding.  Extremities: WWP, SONG spontaneously    LABS:                        8.1    10.52 )-----------( 301      ( 21 Oct 2022 00:31 )             26.1     10-    142  |  107  |  27<H>  ----------------------------<  95  4.4   |  23  |  1.22    Ca    9.1      21 Oct 2022 00:31  Phos  3.8     10-  Mg     2.2     10-    TPro  7.1  /  Alb  3.4  /  TBili  0.2  /  DBili  x   /  AST  15  /  ALT  8<L>  /  AlkPhos  76  10-    PT/INR - ( 20 Oct 2022 00:57 )   PT: 13.7 sec;   INR: 1.18 ratio         PTT - ( 20 Oct 2022 00:57 )  PTT:29.7 sec  LIVER FUNCTIONS - ( 21 Oct 2022 00:31 )  Alb: 3.4 g/dL / Pro: 7.1 g/dL / ALK PHOS: 76 U/L / ALT: 8 U/L / AST: 15 U/L / GGT: x           Urinalysis Basic - ( 19 Oct 2022 17:10 )    Color: Yellow / Appearance: Turbid / S.022 / pH: x  Gluc: x / Ketone: Negative  / Bili: Negative / Urobili: Negative   Blood: x / Protein: 30 mg/dL / Nitrite: Positive   Leuk Esterase: Large / RBC: 1 /hpf / WBC >50 /HPF   Sq Epi: x / Non Sq Epi: 1 /hpf / Bacteria: Many          IMAGING:

## 2022-10-21 NOTE — OCCUPATIONAL THERAPY INITIAL EVALUATION ADULT - PERTINENT HX OF CURRENT PROBLEM, REHAB EVAL
89 yo female with a PMHx of HTN, presents from [facility] where she was lethargic and weak since last night and hypoxic since this morning. Pt AAOx3 here though not too interactive. Pt comes on 5L NC sats mid 90s. CT Angio PE: : No pulmonary arterial emboli.Small bilateral pleural effusions, left greater than right. The left pleural effusion has increased in size since October 9, 2022.Left lower lobe partial compressive atelectasis.Right lower lobe partial compressive atelectasis possibly with superimposed pneumonia.Acute displaced fractures of the left fourth through sixth ribs as above.Diffuse distention of the mid to upper esophagus is of unclear etiology. Esophagram is recommended for further evaluation of an esophageal lesion. CTH: No acute intracranial bleeding.Chronic left gangliocapsular lacunar infarctions.

## 2022-10-21 NOTE — PROGRESS NOTE ADULT - ASSESSMENT
89 F PMH HTN lives at the [facility] presenting to ED for lethargy and weakness, associated with hypoxia requiring 5L, eventually weaned to 2L in ED. Found to have left ribs 4-6 fractures and UTI admitted to SICU for rib score.    PLAN:  Neuro:  - Multimodal pain control: acetaminophen and lido patch  - Precedex   - Buspirone, citalopram, clonazepam, haldol PRN, trazodone    Resp:  - L rib 4-6 fractures  - b/l pleural effusions and atelectasis  - IS, OOBTC, chest PT  - F/u AM cxr    CV:  - hemodynamically stable  - Metoprolol 12.5mg q12h    GI:  - Regular diet  - Monitor bowel movements    Renal:  - LR @ 50cc  - Monitor I/O  - Elevated BUN/Cr, unknown baseline, continue to monitor  - Monitor electrolytes    ID:  - UTI  - s/p azithromycin and ceftriaxone in ED  - Continue ceftriaxone for positive UA   - blood cultures pending    Heme:  - DVT ppx with heparin subQ    Code Status: DNR/DNI  Dispo: SICU

## 2022-10-21 NOTE — PROGRESS NOTE ADULT - ATTENDING COMMENTS
Pt is a 89 year old female with a medical history significant for CAD and HTN who presents to [facility] with lethargy and weakness. Work up revealed left ribs 4-6 fractures and a UTI. Pt was admitted to the SICU for rib score.    A/p  Delirium  D/c Precedex  Continue Buspirone, citalopram, clonazepam, haldol PRN, trazodone    Left rib 4-6 fractures  ISP    HTN  Continue B-blocker    Reg diet    Monitor I's and O's    UTI  Continue ceftriaxone

## 2022-10-21 NOTE — PROGRESS NOTE ADULT - SUBJECTIVE AND OBJECTIVE BOX
SUBJECTIVE/ROS:    24 HOUR EVENTS:  - Started precedex     [ ] A ten-point review of systems was otherwise negative except as noted.  [x] Due to altered mental status/intubation, subjective information were not able to be obtained from the patient. History was obtained, to the extent possible, from review of the chart and collateral sources of information.    NEURO  Exam: awake and alert   Meds: acetaminophen   IVPB .. 500 milliGRAM(s) IV Intermittent every 6 hours PRN Mild Pain (1 - 3)  busPIRone 7.5 milliGRAM(s) Oral every 12 hours  citalopram 20 milliGRAM(s) Oral daily  clonazePAM  Tablet 0.25 milliGRAM(s) Oral at bedtime  dexMEDEtomidine Infusion 0.4 MICROgram(s)/kG/Hr IV Continuous <Continuous>  haloperidol    Injectable 1 milliGRAM(s) IV Push two times a day PRN Agitation  traZODone 100 milliGRAM(s) Oral at bedtime    [x] Adequacy of sedation and pain control has been assessed and adjusted      RESPIRATORY  RR: 19 (10-20-22 @ 23:00) (12 - 27)  SpO2: 96% (10-20-22 @ 23:00) (90% - 99%)  Wt(kg): --  Exam: unlabored, chest wall nontender to palpation, no ecchymosis     CARDIOVASCULAR  HR: 82 (10-20-22 @ 23:00) (41 - 100)  BP: 138/72 (10-20-22 @ 23:00) (111/80 - 183/71)  BP(mean): 92 (10-20-22 @ 23:00) (81 - 117)  ABP: --  ABP(mean): --  Wt(kg): --  CVP(cm H2O): --  VBG - ( 20 Oct 2022 00:40 )  pH: 7.32  /  pCO2: 52    /  pO2: 39    / HCO3: 27    / Base Excess: 0.4   /  SaO2: 64.9   Lactate: 0.5      Exam:  Cardiac Rhythm: sinus   Perfusion     [x]Adequate   [ ]Inadequate  Mentation   [x]Normal       [ ]Reduced  Extremities  [x]Warm         [ ]Cool  Volume Status [ ]Hypervolemic [x]Euvolemic [ ]Hypovolemic  Meds: metoprolol tartrate 12.5 milliGRAM(s) Oral every 12 hours    GI/NUTRITION  Exam: soft, nondistended   Diet: regular     GENITOURINARY  I&O's Detail    10-19 @ 07:01  -  10-20 @ 07:00  --------------------------------------------------------  IN:    IV PiggyBack: 72.5 mL  Total IN: 72.5 mL    OUT:  Total OUT: 0 mL    Total NET: 72.5 mL      10-20 @ 07:01  -  10-21 @ 00:22  --------------------------------------------------------  IN:    Dexmedetomidine: 10.9 mL    Lactated Ringers: 300 mL  Total IN: 310.9 mL    OUT:    Intermittent Catheterization - Urethral (mL): 450 mL    Voided (mL): 200 mL  Total OUT: 650 mL    Total NET: -339.1 mL      10-20    140  |  106  |  29<H>  ----------------------------<  94  4.5   |  23  |  1.36<H>    Ca    8.7      20 Oct 2022 00:57  Phos  3.8     10-20  Mg     2.3     10-20    TPro  7.0  /  Alb  3.3  /  TBili  0.2  /  DBili  x   /  AST  11  /  ALT  8<L>  /  AlkPhos  75  10-20    Meds: lactated ringers. 1000 milliLiter(s) IV Continuous <Continuous>    HEMATOLOGIC  Meds: heparin   Injectable 5000 Unit(s) SubCutaneous every 12 hours    [x] VTE Prophylaxis                        8.3    8.17  )-----------( 308      ( 20 Oct 2022 00:57 )             27.4     PT/INR - ( 20 Oct 2022 00:57 )   PT: 13.7 sec;   INR: 1.18 ratio         PTT - ( 20 Oct 2022 00:57 )  PTT:29.7 sec  Transfusion     [ ] PRBC   [ ] Platelets   [ ] FFP   [ ] Cryoprecipitate      INFECTIOUS DISEASES  T(C): 36.8 (10-20-22 @ 23:00), Max: 37.4 (10-20-22 @ 19:00)  Wt(kg): --  WBC Count: 8.17 K/uL (10-20 @ 00:57)    Recent Cultures:  Specimen Source: Clean Catch Clean Catch (Midstream), 10-19 @ 17:10; Results   >100,000 CFU/ml Citrobacter koseri; Gram Stain: --; Organism: --  Specimen Source: .Blood Blood-Peripheral, 10-19 @ 14:10; Results   No growth to date.; Gram Stain: --; Organism: --  Specimen Source: .Blood Blood-Peripheral, 10-19 @ 13:57; Results   No growth to date.; Gram Stain: --; Organism: --    Meds: cefTRIAXone   IVPB 1000 milliGRAM(s) IV Intermittent every 24 hours    ENDOCRINE  Capillary Blood Glucose    ACCESS DEVICES:  [x] Peripheral IV  [ ] Central Venous Line	[ ] R	[ ] L	[ ] IJ	[ ] Fem	[ ] SC	Placed:   [ ] Arterial Line		[ ] R	[ ] L	[ ] Fem	[ ] Rad	[ ] Ax	Placed:   [ ] PICC:					[ ] Mediport  [ ] Urinary Catheter, Date Placed:   [x] Necessity of urinary, arterial, and venous catheters discussed    OTHER MEDICATIONS:  artificial  tears Solution 1 Drop(s) Both EYES two times a day  lidocaine   4% Patch 1 Patch Transdermal every 24 hours      CODE STATUS: Yes

## 2022-10-21 NOTE — PHYSICAL THERAPY INITIAL EVALUATION ADULT - ADDITIONAL COMMENTS
PTA pt required assist with functional mobility and ADLs. Pt lives in an assisted living recently increased level of care with [facility]HA 3hrs per day.

## 2022-10-21 NOTE — PHYSICAL THERAPY INITIAL EVALUATION ADULT - PERTINENT HX OF CURRENT PROBLEM, REHAB EVAL
89 yo female with a PMHx of HTN, presents from [facility] where she was lethargic and weak since last night and hypoxic since this morning. Pt AAOx3 here though not too interactive. Pt comes on 5L NC sats mid 90s. CT Angio PE: : No pulmonary arterial emboli.Small bilateral pleural effusions, left greater than right. The left pleural effusion has increased in size since October 9, 2022.Left lower lobe partial compressive atelectasis.Right lower lobe partial compressive atelectasis possibly with superimposed pneumonia.Acute displaced fractures of the left fourth through sixth ribs as above.Diffuse distention of the mid to upper esophagus is of unclear etiology. Esophagram is recommended for further evaluation of an esophageal lesion. CTH: No acute intracranial bleeding.Chronic left gangliocapsular lacunar infarctions.

## 2022-10-21 NOTE — PROGRESS NOTE ADULT - ASSESSMENT
89 yo female with a PMHx of HTN, presents from [facility] where she was lethargic and weak since last night and hypoxic since this morning. Found to have acute displaced fractures of the left fourth through sixth ribs.    Plan:  - Multimodal pain control   - DVT ppx  - regular diet  - rest of excellent care per SICU    ACS  [id number]

## 2022-10-22 LAB
ALBUMIN SERPL ELPH-MCNC: 3.6 G/DL — SIGNIFICANT CHANGE UP (ref 3.3–5)
ALP SERPL-CCNC: 81 U/L — SIGNIFICANT CHANGE UP (ref 40–120)
ALT FLD-CCNC: 8 U/L — LOW (ref 10–45)
ANION GAP SERPL CALC-SCNC: 15 MMOL/L — SIGNIFICANT CHANGE UP (ref 5–17)
AST SERPL-CCNC: 18 U/L — SIGNIFICANT CHANGE UP (ref 10–40)
BILIRUB SERPL-MCNC: 0.2 MG/DL — SIGNIFICANT CHANGE UP (ref 0.2–1.2)
BUN SERPL-MCNC: 26 MG/DL — HIGH (ref 7–23)
CA-I SERPL-SCNC: 1.23 MMOL/L — SIGNIFICANT CHANGE UP (ref 1.15–1.33)
CALCIUM SERPL-MCNC: 9 MG/DL — SIGNIFICANT CHANGE UP (ref 8.4–10.5)
CHLORIDE SERPL-SCNC: 104 MMOL/L — SIGNIFICANT CHANGE UP (ref 96–108)
CO2 SERPL-SCNC: 23 MMOL/L — SIGNIFICANT CHANGE UP (ref 22–31)
CREAT SERPL-MCNC: 1.05 MG/DL — SIGNIFICANT CHANGE UP (ref 0.5–1.3)
EGFR: 48 ML/MIN/1.73M2 — LOW
GAS PNL BLDV: SIGNIFICANT CHANGE UP
GAS PNL BLDV: SIGNIFICANT CHANGE UP
GLUCOSE SERPL-MCNC: 85 MG/DL — SIGNIFICANT CHANGE UP (ref 70–99)
HCT VFR BLD CALC: 26.8 % — LOW (ref 34.5–45)
LACTATE BLDV-MCNC: 1.7 MMOL/L — SIGNIFICANT CHANGE UP (ref 0.5–2)
MAGNESIUM SERPL-MCNC: 2.1 MG/DL — SIGNIFICANT CHANGE UP (ref 1.6–2.6)
MCHC RBC-ENTMCNC: 31.7 GM/DL — LOW (ref 32–36)
MCV RBC AUTO: 90.5 FL — SIGNIFICANT CHANGE UP (ref 80–100)
PCO2 BLDV: 43 MMHG — HIGH (ref 39–42)
PH BLDV: 7.36 — SIGNIFICANT CHANGE UP (ref 7.32–7.43)
PHOSPHATE SERPL-MCNC: 3.7 MG/DL — SIGNIFICANT CHANGE UP (ref 2.5–4.5)
PLATELET # BLD AUTO: 312 K/UL — SIGNIFICANT CHANGE UP (ref 150–400)
PO2 BLDV: 113 MMHG — HIGH (ref 25–45)
POTASSIUM SERPL-MCNC: 4.1 MMOL/L — SIGNIFICANT CHANGE UP (ref 3.5–5.3)
POTASSIUM SERPL-SCNC: 4.1 MMOL/L — SIGNIFICANT CHANGE UP (ref 3.5–5.3)
PROT SERPL-MCNC: 7.2 G/DL — SIGNIFICANT CHANGE UP (ref 6–8.3)
RBC # BLD: 2.96 M/UL — LOW (ref 3.8–5.2)
RBC # FLD: 14.1 % — SIGNIFICANT CHANGE UP (ref 10.3–14.5)
SAO2 % BLDV: 97.3 % — HIGH (ref 67–88)
SODIUM SERPL-SCNC: 142 MMOL/L — SIGNIFICANT CHANGE UP (ref 135–145)
WBC # BLD: 10.99 K/UL — HIGH (ref 3.8–10.5)
WBC # FLD AUTO: 10.99 K/UL — HIGH (ref 3.8–10.5)

## 2022-10-22 PROCEDURE — 99231 SBSQ HOSP IP/OBS SF/LOW 25: CPT | Mod: GC

## 2022-10-22 RX ORDER — ONDANSETRON 8 MG/1
4 TABLET, FILM COATED ORAL ONCE
Refills: 0 | Status: COMPLETED | OUTPATIENT
Start: 2022-10-22 | End: 2022-10-22

## 2022-10-22 RX ADMIN — Medication 1 DROP(S): at 18:01

## 2022-10-22 RX ADMIN — Medication 12.5 MILLIGRAM(S): at 12:41

## 2022-10-22 RX ADMIN — HEPARIN SODIUM 5000 UNIT(S): 5000 INJECTION INTRAVENOUS; SUBCUTANEOUS at 05:01

## 2022-10-22 RX ADMIN — CITALOPRAM 20 MILLIGRAM(S): 10 TABLET, FILM COATED ORAL at 12:42

## 2022-10-22 RX ADMIN — Medication 7.5 MILLIGRAM(S): at 12:41

## 2022-10-22 RX ADMIN — Medication 100 MILLIGRAM(S): at 21:51

## 2022-10-22 RX ADMIN — HEPARIN SODIUM 5000 UNIT(S): 5000 INJECTION INTRAVENOUS; SUBCUTANEOUS at 18:01

## 2022-10-22 RX ADMIN — Medication 12.5 MILLIGRAM(S): at 23:53

## 2022-10-22 RX ADMIN — ONDANSETRON 4 MILLIGRAM(S): 8 TABLET, FILM COATED ORAL at 14:13

## 2022-10-22 RX ADMIN — Medication 7.5 MILLIGRAM(S): at 23:52

## 2022-10-22 RX ADMIN — Medication 1 DROP(S): at 05:01

## 2022-10-22 RX ADMIN — SENNA PLUS 2 TABLET(S): 8.6 TABLET ORAL at 21:50

## 2022-10-22 RX ADMIN — Medication 0.25 MILLIGRAM(S): at 22:02

## 2022-10-22 RX ADMIN — CEFTRIAXONE 100 MILLIGRAM(S): 500 INJECTION, POWDER, FOR SOLUTION INTRAMUSCULAR; INTRAVENOUS at 00:27

## 2022-10-22 NOTE — PROGRESS NOTE ADULT - ATTENDING COMMENTS
89 year old woman admitted s/p fall with L 4-6 rib fx.  No evidence of respiratory distress.   Dispo planning to sub acute rehab.

## 2022-10-22 NOTE — PROGRESS NOTE ADULT - SUBJECTIVE AND OBJECTIVE BOX
Trauma Surgery Progress Note  Patient is a 89y old  Female who presents with a chief complaint of Fracture of one rib, unspecified side, initial encounter for closed fracture    Per chart: "89 F PMH HTN lives at the [facility] presenting to ED for lethargy and weakness, associated with hypoxia requiring 5L, eventually weaned to 2L in ED. Found to have left ribs 4-6 fractures and UTI admitted to SICU for rib score."     (20 Oct 2022 07:56)      INTERVAL EVENTS: No acute events overnight.  SUBJECTIVE: Patient seen and examined at bedside with surgical team.    OBJECTIVE:    Vital Signs Last 24 Hrs  T(C): 37 (22 Oct 2022 00:45), Max: 37 (22 Oct 2022 00:45)  T(F): 98.6 (22 Oct 2022 00:45), Max: 98.6 (22 Oct 2022 00:45)  HR: 61 (22 Oct 2022 00:45) (39 - 76)  BP: 166/65 (22 Oct 2022 00:45) (113/69 - 184/73)  BP(mean): 101 (21 Oct 2022 18:00) (95 - 127)  RR: 20 (22 Oct 2022 00:45) (11 - 39)  SpO2: 95% (22 Oct 2022 00:45) (93% - 100%)    Parameters below as of 22 Oct 2022 00:45  Patient On (Oxygen Delivery Method): room air    I&O's Detail    20 Oct 2022 07:01  -  21 Oct 2022 07:00  --------------------------------------------------------  IN:    Dexmedetomidine: 25.4 mL    IV PiggyBack: 100 mL    Lactated Ringers: 700 mL  Total IN: 825.4 mL    OUT:    Intermittent Catheterization - Urethral (mL): 450 mL    Voided (mL): 200 mL  Total OUT: 650 mL    Total NET: 175.4 mL      21 Oct 2022 07:01  -  22 Oct 2022 02:29  --------------------------------------------------------  IN:    Lactated Ringers: 150 mL  Total IN: 150 mL    OUT:    Dexmedetomidine: 0 mL    IV PiggyBack: 0 mL    Voided (mL): 200 mL  Total OUT: 200 mL    Total NET: -50 mL      MEDICATIONS  (STANDING):  artificial  tears Solution 1 Drop(s) Both EYES two times a day  busPIRone 7.5 milliGRAM(s) Oral every 12 hours  citalopram 20 milliGRAM(s) Oral daily  clonazePAM  Tablet 0.25 milliGRAM(s) Oral at bedtime  heparin   Injectable 5000 Unit(s) SubCutaneous every 12 hours  lidocaine   4% Patch 1 Patch Transdermal every 24 hours  metoprolol tartrate 12.5 milliGRAM(s) Oral every 12 hours  polyethylene glycol 3350 17 Gram(s) Oral daily  senna 2 Tablet(s) Oral at bedtime  traZODone 100 milliGRAM(s) Oral at bedtime    MEDICATIONS  (PRN):  acetaminophen   IVPB .. 500 milliGRAM(s) IV Intermittent every 6 hours PRN Mild Pain (1 - 3)  haloperidol    Injectable 1 milliGRAM(s) IV Push two times a day PRN Agitation      PHYSICAL EXAM:  Constitutional: A&Ox3, NAD  Respiratory: Unlabored breathing  Abdomen: Soft, nondistended, NTTP. No rebound or guarding.  Extremities: WWP, SONG spontaneously    LABS:                        8.1    10.52 )-----------( 301      ( 21 Oct 2022 00:31 )             26.1     10-21    142  |  107  |  27<H>  ----------------------------<  95  4.4   |  23  |  1.22    Ca    9.1      21 Oct 2022 00:31  Phos  3.8     10-21  Mg     2.2     10-21    TPro  7.1  /  Alb  3.4  /  TBili  0.2  /  DBili  x   /  AST  15  /  ALT  8<L>  /  AlkPhos  76  10-21      LIVER FUNCTIONS - ( 21 Oct 2022 00:31 )  Alb: 3.4 g/dL / Pro: 7.1 g/dL / ALK PHOS: 76 U/L / ALT: 8 U/L / AST: 15 U/L / GGT: x                 IMAGING:

## 2022-10-22 NOTE — PROGRESS NOTE ADULT - ASSESSMENT
89 yo female with a PMHx of HTN, presents from [facility] where she was lethargic and weak since last night and hypoxic since this morning. Found to have acute displaced fractures of the left fourth through sixth ribs.    Plan:  - Multimodal pain control   - DVT ppx  - regular diet  - OOB  - IS    [facility]  [id number] 89 yo female with a PMHx of HTN, presents from [facility] where she was lethargic and weak since last night and hypoxic since this morning. Found to have acute displaced fractures of the left fourth through sixth ribs.    Plan:  - Multimodal pain control   - DVT ppx  - regular diet  - OOB  - IS  -  1 Zofran  [facility]  [id number]

## 2022-10-23 LAB
ANION GAP SERPL CALC-SCNC: 18 MMOL/L — HIGH (ref 5–17)
BUN SERPL-MCNC: 25 MG/DL — HIGH (ref 7–23)
CALCIUM SERPL-MCNC: 9 MG/DL — SIGNIFICANT CHANGE UP (ref 8.4–10.5)
CHLORIDE SERPL-SCNC: 105 MMOL/L — SIGNIFICANT CHANGE UP (ref 96–108)
CO2 SERPL-SCNC: 21 MMOL/L — LOW (ref 22–31)
CREAT SERPL-MCNC: 0.94 MG/DL — SIGNIFICANT CHANGE UP (ref 0.5–1.3)
EGFR: 54 ML/MIN/1.73M2 — LOW
GLUCOSE SERPL-MCNC: 83 MG/DL — SIGNIFICANT CHANGE UP (ref 70–99)
HCT VFR BLD CALC: 29.9 % — LOW (ref 34.5–45)
HGB BLD-MCNC: 9.3 G/DL — LOW (ref 11.5–15.5)
MAGNESIUM SERPL-MCNC: 2.1 MG/DL — SIGNIFICANT CHANGE UP (ref 1.6–2.6)
MCHC RBC-ENTMCNC: 29.1 PG — SIGNIFICANT CHANGE UP (ref 27–34)
MCHC RBC-ENTMCNC: 31.1 GM/DL — LOW (ref 32–36)
MCV RBC AUTO: 93.4 FL — SIGNIFICANT CHANGE UP (ref 80–100)
NRBC # BLD: 0 /100 WBCS — SIGNIFICANT CHANGE UP (ref 0–0)
PHOSPHATE SERPL-MCNC: 3.4 MG/DL — SIGNIFICANT CHANGE UP (ref 2.5–4.5)
PLATELET # BLD AUTO: 310 K/UL — SIGNIFICANT CHANGE UP (ref 150–400)
POTASSIUM SERPL-MCNC: 4 MMOL/L — SIGNIFICANT CHANGE UP (ref 3.5–5.3)
POTASSIUM SERPL-SCNC: 4 MMOL/L — SIGNIFICANT CHANGE UP (ref 3.5–5.3)
RBC # BLD: 3.2 M/UL — LOW (ref 3.8–5.2)
RBC # FLD: 14 % — SIGNIFICANT CHANGE UP (ref 10.3–14.5)
SODIUM SERPL-SCNC: 144 MMOL/L — SIGNIFICANT CHANGE UP (ref 135–145)
WBC # BLD: 14.67 K/UL — HIGH (ref 3.8–10.5)
WBC # FLD AUTO: 14.67 K/UL — HIGH (ref 3.8–10.5)

## 2022-10-23 PROCEDURE — 71045 X-RAY EXAM CHEST 1 VIEW: CPT | Mod: 26

## 2022-10-23 PROCEDURE — 99231 SBSQ HOSP IP/OBS SF/LOW 25: CPT | Mod: GC

## 2022-10-23 RX ORDER — HYDRALAZINE HCL 50 MG
10 TABLET ORAL ONCE
Refills: 0 | Status: COMPLETED | OUTPATIENT
Start: 2022-10-23 | End: 2022-10-23

## 2022-10-23 RX ORDER — OXYCODONE HYDROCHLORIDE 5 MG/1
2.5 TABLET ORAL EVERY 6 HOURS
Refills: 0 | Status: DISCONTINUED | OUTPATIENT
Start: 2022-10-23 | End: 2022-10-28

## 2022-10-23 RX ORDER — ACETAMINOPHEN 500 MG
750 TABLET ORAL EVERY 6 HOURS
Refills: 0 | Status: COMPLETED | OUTPATIENT
Start: 2022-10-23 | End: 2022-10-24

## 2022-10-23 RX ADMIN — HEPARIN SODIUM 5000 UNIT(S): 5000 INJECTION INTRAVENOUS; SUBCUTANEOUS at 18:45

## 2022-10-23 RX ADMIN — Medication 12.5 MILLIGRAM(S): at 13:55

## 2022-10-23 RX ADMIN — CITALOPRAM 20 MILLIGRAM(S): 10 TABLET, FILM COATED ORAL at 13:09

## 2022-10-23 RX ADMIN — Medication 1 DROP(S): at 05:15

## 2022-10-23 RX ADMIN — Medication 1 DROP(S): at 18:46

## 2022-10-23 RX ADMIN — Medication 300 MILLIGRAM(S): at 20:32

## 2022-10-23 RX ADMIN — SENNA PLUS 2 TABLET(S): 8.6 TABLET ORAL at 22:04

## 2022-10-23 RX ADMIN — Medication 7.5 MILLIGRAM(S): at 22:04

## 2022-10-23 RX ADMIN — Medication 10 MILLIGRAM(S): at 18:46

## 2022-10-23 RX ADMIN — Medication 200 MILLIGRAM(S): at 13:55

## 2022-10-23 RX ADMIN — Medication 500 MILLIGRAM(S): at 14:20

## 2022-10-23 RX ADMIN — HEPARIN SODIUM 5000 UNIT(S): 5000 INJECTION INTRAVENOUS; SUBCUTANEOUS at 05:14

## 2022-10-23 RX ADMIN — Medication 100 MILLIGRAM(S): at 22:04

## 2022-10-23 RX ADMIN — Medication 750 MILLIGRAM(S): at 21:02

## 2022-10-23 RX ADMIN — Medication 7.5 MILLIGRAM(S): at 13:08

## 2022-10-23 NOTE — PROGRESS NOTE ADULT - ASSESSMENT
89 yo female with a PMHx of HTN, presents from [facility] where she was lethargic and weak since last night and hypoxic since this morning. Found to have acute displaced fractures of the left fourth through sixth ribs.    Plan:  - Multimodal pain control   - DVT ppx  - regular diet  - OOB  - IS  - TEENA      [facility]  [id number] 89 yo female with a PMHx of HTN, presents from [facility] where she was lethargic and weak since last night and hypoxic since this morning. Found to have acute displaced fractures of the left fourth through sixth ribs.    Plan:  - Multimodal pain control   -Will DC haldol/clonazepam/other sedating medications in effort to reduce drowsiness  -F/U CXR   - DVT ppx  - regular diet  - OOB  - IS  - TEENA      ACS  [id number]

## 2022-10-23 NOTE — PROGRESS NOTE ADULT - SUBJECTIVE AND OBJECTIVE BOX
Surgery Progress Note    INTERVAL EVENTS:   No acute events overnight.    SUBJECTIVE: Patient seen and examined at bedside with surgical team,     OBJECTIVE:    Vital Signs Last 24 Hrs  T(C): 37.8 (22 Oct 2022 23:48), Max: 37.8 (22 Oct 2022 23:48)  T(F): 100 (22 Oct 2022 23:48), Max: 100 (22 Oct 2022 23:48)  HR: 77 (22 Oct 2022 23:48) (65 - 81)  BP: 119/86 (22 Oct 2022 23:48) (119/86 - 167/91)  BP(mean): --  RR: 20 (22 Oct 2022 23:48) (18 - 20)  SpO2: 95% (22 Oct 2022 23:48) (92% - 97%)    Parameters below as of 22 Oct 2022 23:48  Patient On (Oxygen Delivery Method): room air    I&O's Detail    21 Oct 2022 07:01  -  22 Oct 2022 07:00  --------------------------------------------------------  IN:    Lactated Ringers: 150 mL  Total IN: 150 mL    OUT:    Dexmedetomidine: 0 mL    IV PiggyBack: 0 mL    Voided (mL): 200 mL  Total OUT: 200 mL    Total NET: -50 mL      22 Oct 2022 07:01  -  23 Oct 2022 02:02  --------------------------------------------------------  IN:  Total IN: 0 mL    OUT:    Voided (mL): 600 mL  Total OUT: 600 mL    Total NET: -600 mL      MEDICATIONS  (STANDING):  artificial  tears Solution 1 Drop(s) Both EYES two times a day  busPIRone 7.5 milliGRAM(s) Oral every 12 hours  citalopram 20 milliGRAM(s) Oral daily  clonazePAM  Tablet 0.25 milliGRAM(s) Oral at bedtime  heparin   Injectable 5000 Unit(s) SubCutaneous every 12 hours  lidocaine   4% Patch 1 Patch Transdermal every 24 hours  metoprolol tartrate 12.5 milliGRAM(s) Oral every 12 hours  polyethylene glycol 3350 17 Gram(s) Oral daily  senna 2 Tablet(s) Oral at bedtime  traZODone 100 milliGRAM(s) Oral at bedtime    MEDICATIONS  (PRN):  acetaminophen   IVPB .. 500 milliGRAM(s) IV Intermittent every 6 hours PRN Mild Pain (1 - 3)  haloperidol    Injectable 1 milliGRAM(s) IV Push two times a day PRN Agitation      PHYSICAL EXAM:  Constitutional: A&Ox3, NAD  Respiratory: Unlabored breathing  Abdomen: Soft, nondistended, NTTP. No rebound or guarding.  Extremities: WWP, SONG spontaneously  LABS:                        8.5    10.99 )-----------( 312      ( 22 Oct 2022 07:12 )             26.8     10-22    142  |  104  |  26<H>  ----------------------------<  85  4.1   |  23  |  1.05    Ca    9.0      22 Oct 2022 07:12  Phos  3.7     10-22  Mg     2.1     10-22    TPro  7.2  /  Alb  3.6  /  TBili  0.2  /  DBili  x   /  AST  18  /  ALT  8<L>  /  AlkPhos  81  10-22      LIVER FUNCTIONS - ( 22 Oct 2022 07:12 )  Alb: 3.6 g/dL / Pro: 7.2 g/dL / ALK PHOS: 81 U/L / ALT: 8 U/L / AST: 18 U/L / GGT: x                 IMAGING:     Surgery Progress Note    INTERVAL EVENTS:   No acute events overnight.    SUBJECTIVE: Patient seen and examined at bedside with surgical team. Patient drowsy on AM rounds.    OBJECTIVE:    Vital Signs Last 24 Hrs  T(C): 37.8 (22 Oct 2022 23:48), Max: 37.8 (22 Oct 2022 23:48)  T(F): 100 (22 Oct 2022 23:48), Max: 100 (22 Oct 2022 23:48)  HR: 77 (22 Oct 2022 23:48) (65 - 81)  BP: 119/86 (22 Oct 2022 23:48) (119/86 - 167/91)  BP(mean): --  RR: 20 (22 Oct 2022 23:48) (18 - 20)  SpO2: 95% (22 Oct 2022 23:48) (92% - 97%)    Parameters below as of 22 Oct 2022 23:48  Patient On (Oxygen Delivery Method): room air    I&O's Detail    21 Oct 2022 07:01  -  22 Oct 2022 07:00  --------------------------------------------------------  IN:    Lactated Ringers: 150 mL  Total IN: 150 mL    OUT:    Dexmedetomidine: 0 mL    IV PiggyBack: 0 mL    Voided (mL): 200 mL  Total OUT: 200 mL    Total NET: -50 mL      22 Oct 2022 07:01  -  23 Oct 2022 02:02  --------------------------------------------------------  IN:  Total IN: 0 mL    OUT:    Voided (mL): 600 mL  Total OUT: 600 mL    Total NET: -600 mL      MEDICATIONS  (STANDING):  artificial  tears Solution 1 Drop(s) Both EYES two times a day  busPIRone 7.5 milliGRAM(s) Oral every 12 hours  citalopram 20 milliGRAM(s) Oral daily  clonazePAM  Tablet 0.25 milliGRAM(s) Oral at bedtime  heparin   Injectable 5000 Unit(s) SubCutaneous every 12 hours  lidocaine   4% Patch 1 Patch Transdermal every 24 hours  metoprolol tartrate 12.5 milliGRAM(s) Oral every 12 hours  polyethylene glycol 3350 17 Gram(s) Oral daily  senna 2 Tablet(s) Oral at bedtime  traZODone 100 milliGRAM(s) Oral at bedtime    MEDICATIONS  (PRN):  acetaminophen   IVPB .. 500 milliGRAM(s) IV Intermittent every 6 hours PRN Mild Pain (1 - 3)  haloperidol    Injectable 1 milliGRAM(s) IV Push two times a day PRN Agitation      PHYSICAL EXAM:  Constitutional: A&Ox3, NAD  Respiratory: Unlabored breathing  Abdomen: Soft, nondistended, NTTP. No rebound or guarding.  Extremities: WWP, SONG spontaneously  LABS:                        8.5    10.99 )-----------( 312      ( 22 Oct 2022 07:12 )             26.8     10-22    142  |  104  |  26<H>  ----------------------------<  85  4.1   |  23  |  1.05    Ca    9.0      22 Oct 2022 07:12  Phos  3.7     10-22  Mg     2.1     10-22    TPro  7.2  /  Alb  3.6  /  TBili  0.2  /  DBili  x   /  AST  18  /  ALT  8<L>  /  AlkPhos  81  10-22      LIVER FUNCTIONS - ( 22 Oct 2022 07:12 )  Alb: 3.6 g/dL / Pro: 7.2 g/dL / ALK PHOS: 81 U/L / ALT: 8 U/L / AST: 18 U/L / GGT: x                 IMAGING:

## 2022-10-23 NOTE — PROGRESS NOTE ADULT - ATTENDING COMMENTS
89 year old woman admitted s/p fall with L 4-6 rib fx.  Initially required haldol for agitation however mental status improved.   Patient comfortable with no respiratory distress.   Dispo planning to sub acute rehab. 89 year old woman admitted s/p fall with L 4-6 rib fx.  Episode of desaturation overnight. Repeat CXR obtained today, stable.   Initially required haldol for agitation however mental status improved.   Completed 3 day course of ceftriaxone for UTI.   Dispo planning to sub acute rehab.

## 2022-10-24 LAB
ANION GAP SERPL CALC-SCNC: 17 MMOL/L — SIGNIFICANT CHANGE UP (ref 5–17)
BUN SERPL-MCNC: 28 MG/DL — HIGH (ref 7–23)
CALCIUM SERPL-MCNC: 9 MG/DL — SIGNIFICANT CHANGE UP (ref 8.4–10.5)
CHLORIDE SERPL-SCNC: 105 MMOL/L — SIGNIFICANT CHANGE UP (ref 96–108)
CO2 SERPL-SCNC: 22 MMOL/L — SIGNIFICANT CHANGE UP (ref 22–31)
CREAT SERPL-MCNC: 0.97 MG/DL — SIGNIFICANT CHANGE UP (ref 0.5–1.3)
CULTURE RESULTS: SIGNIFICANT CHANGE UP
CULTURE RESULTS: SIGNIFICANT CHANGE UP
EGFR: 52 ML/MIN/1.73M2 — LOW
GLUCOSE BLDC GLUCOMTR-MCNC: 138 MG/DL — HIGH (ref 70–99)
GLUCOSE SERPL-MCNC: 113 MG/DL — HIGH (ref 70–99)
HCT VFR BLD CALC: 31.1 % — LOW (ref 34.5–45)
HGB BLD-MCNC: 9.6 G/DL — LOW (ref 11.5–15.5)
MAGNESIUM SERPL-MCNC: 2.2 MG/DL — SIGNIFICANT CHANGE UP (ref 1.6–2.6)
MCHC RBC-ENTMCNC: 28.6 PG — SIGNIFICANT CHANGE UP (ref 27–34)
MCHC RBC-ENTMCNC: 30.9 GM/DL — LOW (ref 32–36)
MCV RBC AUTO: 92.6 FL — SIGNIFICANT CHANGE UP (ref 80–100)
NRBC # BLD: 0 /100 WBCS — SIGNIFICANT CHANGE UP (ref 0–0)
PHOSPHATE SERPL-MCNC: 2.8 MG/DL — SIGNIFICANT CHANGE UP (ref 2.5–4.5)
PLATELET # BLD AUTO: 332 K/UL — SIGNIFICANT CHANGE UP (ref 150–400)
POTASSIUM SERPL-MCNC: 3.7 MMOL/L — SIGNIFICANT CHANGE UP (ref 3.5–5.3)
POTASSIUM SERPL-SCNC: 3.7 MMOL/L — SIGNIFICANT CHANGE UP (ref 3.5–5.3)
RBC # BLD: 3.36 M/UL — LOW (ref 3.8–5.2)
RBC # FLD: 14.2 % — SIGNIFICANT CHANGE UP (ref 10.3–14.5)
SARS-COV-2 RNA SPEC QL NAA+PROBE: SIGNIFICANT CHANGE UP
SODIUM SERPL-SCNC: 144 MMOL/L — SIGNIFICANT CHANGE UP (ref 135–145)
SPECIMEN SOURCE: SIGNIFICANT CHANGE UP
SPECIMEN SOURCE: SIGNIFICANT CHANGE UP
WBC # BLD: 15.55 K/UL — HIGH (ref 3.8–10.5)
WBC # FLD AUTO: 15.55 K/UL — HIGH (ref 3.8–10.5)

## 2022-10-24 PROCEDURE — 99223 1ST HOSP IP/OBS HIGH 75: CPT

## 2022-10-24 RX ORDER — PIPERACILLIN AND TAZOBACTAM 4; .5 G/20ML; G/20ML
3.38 INJECTION, POWDER, LYOPHILIZED, FOR SOLUTION INTRAVENOUS ONCE
Refills: 0 | Status: COMPLETED | OUTPATIENT
Start: 2022-10-24 | End: 2022-10-24

## 2022-10-24 RX ORDER — CEFTRIAXONE 500 MG/1
1000 INJECTION, POWDER, FOR SOLUTION INTRAMUSCULAR; INTRAVENOUS EVERY 24 HOURS
Refills: 0 | Status: DISCONTINUED | OUTPATIENT
Start: 2022-10-24 | End: 2022-10-24

## 2022-10-24 RX ORDER — POTASSIUM PHOSPHATE, MONOBASIC POTASSIUM PHOSPHATE, DIBASIC 236; 224 MG/ML; MG/ML
15 INJECTION, SOLUTION INTRAVENOUS ONCE
Refills: 0 | Status: COMPLETED | OUTPATIENT
Start: 2022-10-24 | End: 2022-10-24

## 2022-10-24 RX ORDER — PIPERACILLIN AND TAZOBACTAM 4; .5 G/20ML; G/20ML
3.38 INJECTION, POWDER, LYOPHILIZED, FOR SOLUTION INTRAVENOUS EVERY 8 HOURS
Refills: 0 | Status: COMPLETED | OUTPATIENT
Start: 2022-10-24 | End: 2022-11-03

## 2022-10-24 RX ORDER — SODIUM CHLORIDE 9 MG/ML
1000 INJECTION, SOLUTION INTRAVENOUS
Refills: 0 | Status: DISCONTINUED | OUTPATIENT
Start: 2022-10-24 | End: 2022-10-25

## 2022-10-24 RX ADMIN — Medication 750 MILLIGRAM(S): at 02:10

## 2022-10-24 RX ADMIN — PIPERACILLIN AND TAZOBACTAM 25 GRAM(S): 4; .5 INJECTION, POWDER, LYOPHILIZED, FOR SOLUTION INTRAVENOUS at 20:12

## 2022-10-24 RX ADMIN — Medication 12.5 MILLIGRAM(S): at 01:27

## 2022-10-24 RX ADMIN — Medication 7.5 MILLIGRAM(S): at 22:18

## 2022-10-24 RX ADMIN — Medication 1 DROP(S): at 17:04

## 2022-10-24 RX ADMIN — Medication 7.5 MILLIGRAM(S): at 16:44

## 2022-10-24 RX ADMIN — POTASSIUM PHOSPHATE, MONOBASIC POTASSIUM PHOSPHATE, DIBASIC 62.5 MILLIMOLE(S): 236; 224 INJECTION, SOLUTION INTRAVENOUS at 23:14

## 2022-10-24 RX ADMIN — Medication 100 MILLIGRAM(S): at 22:18

## 2022-10-24 RX ADMIN — Medication 300 MILLIGRAM(S): at 16:30

## 2022-10-24 RX ADMIN — HEPARIN SODIUM 5000 UNIT(S): 5000 INJECTION INTRAVENOUS; SUBCUTANEOUS at 17:04

## 2022-10-24 RX ADMIN — Medication 1 DROP(S): at 05:27

## 2022-10-24 RX ADMIN — Medication 12.5 MILLIGRAM(S): at 16:47

## 2022-10-24 RX ADMIN — SODIUM CHLORIDE 85 MILLILITER(S): 9 INJECTION, SOLUTION INTRAVENOUS at 16:48

## 2022-10-24 RX ADMIN — Medication 300 MILLIGRAM(S): at 01:40

## 2022-10-24 RX ADMIN — CITALOPRAM 20 MILLIGRAM(S): 10 TABLET, FILM COATED ORAL at 17:06

## 2022-10-24 RX ADMIN — SENNA PLUS 2 TABLET(S): 8.6 TABLET ORAL at 22:19

## 2022-10-24 RX ADMIN — PIPERACILLIN AND TAZOBACTAM 200 GRAM(S): 4; .5 INJECTION, POWDER, LYOPHILIZED, FOR SOLUTION INTRAVENOUS at 17:00

## 2022-10-24 RX ADMIN — HEPARIN SODIUM 5000 UNIT(S): 5000 INJECTION INTRAVENOUS; SUBCUTANEOUS at 05:27

## 2022-10-24 NOTE — PATIENT PROFILE ADULT - FALL HARM RISK - HARM RISK INTERVENTIONS
Assistance with ambulation/Assistance OOB with selected safe patient handling equipment/Communicate Risk of Fall with Harm to all staff/Monitor for mental status changes/Move patient closer to nurses' station/Reinforce activity limits and safety measures with patient and family/Reorient to person, place and time as needed/Tailored Fall Risk Interventions/Toileting schedule using arm’s reach rule for commode and bathroom/Use of alarms - bed, chair and/or voice tab/Visual Cue: Yellow wristband and red socks/Bed in lowest position, wheels locked, appropriate side rails in place/Call bell, personal items and telephone in reach/Instruct patient to call for assistance before getting out of bed or chair/Non-slip footwear when patient is out of bed/[place] to call system/Physically safe environment - no spills, clutter or unnecessary equipment/Purposeful Proactive Rounding/Room/bathroom lighting operational, light cord in reach

## 2022-10-24 NOTE — PROGRESS NOTE ADULT - ASSESSMENT
89F hx dementia, anxiety/depression and HTN presenting from [facility] with weakness and encephalopathy, found to have L displaced 4-6 rib fractures suggesting a preceding trauma or fall, unclear if syncopal vs. mechanical, found also to have citrobacter UTI and uptrending white count despite appropriate antibiotics concerning for concomitant PNA.     1. Rib fractures- L sided displaced rib fractures suggest a preceding trauma or fall. Unclear if fall would have been syncopal or mechanical, but in the setting of active infections, syncopal from hypovolemia is possible.  - recommend tele monitoring at least x 24 hours  - s/p ceftriaxone for UTI, would start zosyn for PNA seen on CXR  - IVF on 10/24 for dry mucous membranes and likely poor PO intake  - no clinical evidence of overload, would check pro-BNP  - check orthostatics when able  - PT-eval    2. HTN- 's. Goal SBP for age would be 130-140's  - cont metop 12.5mg po bid- would transition to toprol xl 25mg po daily when able  - may resume nifedipine 60mg po daily  - continue holding losartan 50 unless pressures climb > 160's    3. Leukocytosis- uptrended white count and positive Ucx growing citrobacter pansensitive  - s/p ceftriaxone x 3 days  - white count continued to uptrend- review of CXR reveals L retrocardiac opacity, possible PNA  - will start zosyn 3.375gm iv q 8 hours for presumed PNA- anticipate 5-7 day treatment  - cont trending CBC    4. anxiety/depression- cont home buspirone, celexa, trazodone  - cont holding klonopin  - may resume mirtazapine if tolerating the above    dispo: pending improvement in white count, final abx plan, orthostatics check, PT eval   reached out to PCP office and requested outside records for review- awaiting fax

## 2022-10-24 NOTE — PROGRESS NOTE ADULT - SUBJECTIVE AND OBJECTIVE BOX
MEDICINE CONSULT NOTE  Hospitalist- Chuck Narayanan MD  Available on MS Teams  If no response or off-hours, page [phone]  -------------------------------------  HPI:  Patient is a 89 yo female with a PMHx of HTN, presents from [facility] where she was lethargic and weak since last night and hypoxic since this morning. Pt AAOx3 here though not too interactive. Pt comes on 5L NC sats mid 90s. Denies any pain anywhere, denies any fevers/chills, cough, chest pain, abd pain, n/v/d, urinary sxs    Pt admitted to trauma surgery service having been found to have acute displaced L 4-6 rib fractures. She was also treated with short course of ceftriaxone for citrobacter UTI, but noted to have uptrending white count. Medicine consulted for further management recs for UTI. At present, pt is asleep but arousable, pleasant and follows simple commands but is confused, doesn't know where she is or the date. Denies any new/acute complaints or concerns, but ros limited due to dementia.       Home Medications:  Ambien 5 mg oral tablet: 1 tab(s) orally once a day (at bedtime), As Needed for insomnia (20 Oct 2022 08:23)  B 100 Complex oral tablet: 1 tab(s) orally once a day (20 Oct 2022 08:23)  busPIRone 7.5 mg oral tablet: 1 tab(s) orally 2 times a day (20 Oct 2022 08:23)  Calcium 600+D Plus Minerals oral tablet, chewable: 1 tab(s) orally once a day (20 Oct 2022 08:23)  citalopram 20 mg oral tablet: 1 tab(s) orally once a day (20 Oct 2022 08:23)  clonazePAM 0.5 mg oral tablet: 1 tab(s) orally 2 times a day (20 Oct 2022 08:23)  gabapentin 300 mg oral capsule: 1 cap(s) orally 2 times a day (20 Oct 2022 08:23)  losartan 50 mg oral tablet: 1 tab(s) orally once a day (20 Oct 2022 08:23)  Metoprolol Succinate ER 25 mg oral tablet, extended release: 1 tab(s) orally once a day (20 Oct 2022 08:23)  mirtazapine 15 mg oral tablet: 1 tab(s) orally once a day (at bedtime) (20 Oct 2022 08:23)  Multiple Vitamins oral tablet: 1 tab(s) orally once a day (20 Oct 2022 08:23)  NIFEdipine 60 mg oral tablet, extended release: 1 tab(s) orally once a day (20 Oct 2022 08:23)  oxybutynin 5 mg/24 hours oral tablet, extended release: 1 tab(s) orally once a day (20 Oct 2022 08:23)  pantoprazole 40 mg oral delayed release tablet: 1 tab(s) orally once a day (20 Oct 2022 08:23)  traZODone 100 mg oral tablet: 1  orally once a day (at bedtime) (20 Oct 2022 08:23)  Vitamin B12 1000 mcg oral tablet: 1 tab(s) orally once a day (20 Oct 2022 08:23)  Vitamin D3 400 intl units oral tablet: 1 tab(s) orally once a day (20 Oct 2022 08:23)      PAST MEDICAL & SURGICAL HISTORY:  Pacemaker      Anxiety      Depression      Hypertension      GERD (gastroesophageal reflux disease)      Osteopenia      Insomnia      Spastic bladder      Vitamin B12 deficiency      Constipation      Paroxysmal atrial fibrillation      Anxiety      HTN (hypertension)      GERD (gastroesophageal reflux disease)      S/P hysterectomy      Small bowel volvulus          Review of Systems: otherwise negative    Allergies    No Known Allergies    Intolerances        Social History: unable to fully assess    FAMILY HISTORY:   Noncontributory    MEDICATIONS  (STANDING):  acetaminophen   IVPB .. 750 milliGRAM(s) IV Intermittent every 6 hours  artificial  tears Solution 1 Drop(s) Both EYES two times a day  busPIRone 7.5 milliGRAM(s) Oral every 12 hours  citalopram 20 milliGRAM(s) Oral daily  heparin   Injectable 5000 Unit(s) SubCutaneous every 12 hours  lactated ringers. 1000 milliLiter(s) (85 mL/Hr) IV Continuous <Continuous>  lidocaine   4% Patch 1 Patch Transdermal every 24 hours  metoprolol tartrate 12.5 milliGRAM(s) Oral every 12 hours  piperacillin/tazobactam IVPB. 3.375 Gram(s) IV Intermittent once  piperacillin/tazobactam IVPB.- 3.375 Gram(s) IV Intermittent once  piperacillin/tazobactam IVPB.. 3.375 Gram(s) IV Intermittent every 8 hours  polyethylene glycol 3350 17 Gram(s) Oral daily  potassium phosphate IVPB 15 milliMole(s) IV Intermittent once  senna 2 Tablet(s) Oral at bedtime  traZODone 100 milliGRAM(s) Oral at bedtime    MEDICATIONS  (PRN):  oxyCODONE    IR 2.5 milliGRAM(s) Oral every 6 hours PRN Moderate Pain (4 - 6)      Vital Signs Last 24 Hrs  T(C): 37.7 (24 Oct 2022 13:05), Max: 37.8 (24 Oct 2022 08:44)  T(F): 99.8 (24 Oct 2022 13:05), Max: 100 (24 Oct 2022 08:44)  HR: 70 (24 Oct 2022 13:05) (62 - 77)  BP: 155/68 (24 Oct 2022 13:05) (112/65 - 190/96)  BP(mean): --  RR: 18 (24 Oct 2022 13:05) (18 - 18)  SpO2: 94% (24 Oct 2022 13:05) (93% - 97%)    Parameters below as of 24 Oct 2022 13:05  Patient On (Oxygen Delivery Method): room air      CAPILLARY BLOOD GLUCOSE            PHYSICAL EXAM:  GENERAL: NAD, well-developed  HEAD:  Atraumatic, Normocephalic  EYES: EOMI, PERRLA, conjunctiva and sclera clear  NECK: Supple, No JVD  CHEST/LUNG: Clear to auscultation bilaterally; No wheeze  HEART: Regular rate and rhythm; No murmurs, rubs, or gallops  ABDOMEN: Soft, Nontender, Nondistended; Bowel sounds present  EXTREMITIES:  2+ Peripheral Pulses, No clubbing, cyanosis, or edema  PSYCH: AAOx1  NEUROLOGY: non-focal  SKIN: No rashes or lesions    LABS:                        9.6    15.55 )-----------( 332      ( 24 Oct 2022 07:20 )             31.1     10-24    144  |  105  |  28<H>  ----------------------------<  113<H>  3.7   |  22  |  0.97    Ca    9.0      24 Oct 2022 07:25  Phos  2.8     10-24  Mg     2.2     10-24                  RADIOLOGY & ADDITIONAL TESTS:    Imaging Personally Reviewed:  CXR personally reviewed: L retrocardiac opacity  EKG personally reviewed: NSR    Consultant(s) Notes Reviewed:      Care Discussed with Consultants/Other Providers: trauma team

## 2022-10-24 NOTE — PROGRESS NOTE ADULT - ASSESSMENT
89 yo female with a PMHx of HTN, presents from [facility] where she was lethargic and weak since last night and hypoxic since this morning. Found to have acute displaced fractures of the left fourth through sixth ribs.    Plan:  - Multimodal pain control   -Will DC haldol/clonazepam/other sedating medications in effort to reduce drowsiness  -F/U CXR   - DVT ppx  - regular diet  - OOB  - IS  - TEENA      ACS  [id number]   89 yo female with a PMHx of HTN, presents from [facility] where she was lethargic and weak since last night and hypoxic since this morning. Found to have acute displaced fractures of the left fourth through sixth ribs.    Plan:  - Multimodal pain control   - Will DC haldol/clonazepam/other sedating medications in effort to reduce drowsiness  - DVT ppx  - regular diet  - OOB  - IS  - TEENA      [facility]  [phone]   89 yo female with a PMHx of HTN, presents from [facility] where she was lethargic and weak since last night and hypoxic since this morning. Found to have acute displaced fractures of the left fourth through sixth ribs.    Plan:  - Multimodal pain control for rib fractues  -zosyn for PNA on xray   - Will DC haldol/clonazepam/other sedating medications in effort to reduce drowsiness  -metoprolol 25xl qd  nifedipine restarted  -appreciate Medicine recs   - DVT ppx  - puree diet  - OOB  - IS  - TEENA      ACS  [id number]

## 2022-10-24 NOTE — PROGRESS NOTE ADULT - SUBJECTIVE AND OBJECTIVE BOX
ACS DAILY PROGRESS NOTE:       SUBJECTIVE/ROS: No acute events overnight      MEDICATIONS  (STANDING):  acetaminophen   IVPB .. 750 milliGRAM(s) IV Intermittent every 6 hours  artificial  tears Solution 1 Drop(s) Both EYES two times a day  busPIRone 7.5 milliGRAM(s) Oral every 12 hours  citalopram 20 milliGRAM(s) Oral daily  heparin   Injectable 5000 Unit(s) SubCutaneous every 12 hours  lidocaine   4% Patch 1 Patch Transdermal every 24 hours  metoprolol tartrate 12.5 milliGRAM(s) Oral every 12 hours  polyethylene glycol 3350 17 Gram(s) Oral daily  senna 2 Tablet(s) Oral at bedtime  traZODone 100 milliGRAM(s) Oral at bedtime    MEDICATIONS  (PRN):  oxyCODONE    IR 2.5 milliGRAM(s) Oral every 6 hours PRN Moderate Pain (4 - 6)      OBJECTIVE:    Vital Signs Last 24 Hrs  T(C): 36.9 (24 Oct 2022 01:00), Max: 37.7 (23 Oct 2022 18:52)  T(F): 98.5 (24 Oct 2022 01:00), Max: 99.9 (23 Oct 2022 18:52)  HR: 76 (24 Oct 2022 01:00) (53 - 77)  BP: 119/70 (24 Oct 2022 01:00) (112/65 - 190/96)  BP(mean): --  RR: 18 (24 Oct 2022 01:00) (18 - 19)  SpO2: 95% (24 Oct 2022 01:00) (92% - 99%)    Parameters below as of 24 Oct 2022 01:00  Patient On (Oxygen Delivery Method): room air            I&O's Detail    22 Oct 2022 07:01  -  23 Oct 2022 07:00  --------------------------------------------------------  IN:  Total IN: 0 mL    OUT:    Voided (mL): 850 mL  Total OUT: 850 mL    Total NET: -850 mL      23 Oct 2022 07:01  -  24 Oct 2022 03:16  --------------------------------------------------------  IN:    IV PiggyBack: 150 mL    Oral Fluid: 200 mL  Total IN: 350 mL    OUT:    Oral Fluid: 0 mL  Total OUT: 0 mL    Total NET: 350 mL          Daily     Daily     LABS:                        9.3    14.67 )-----------( 310      ( 23 Oct 2022 07:38 )             29.9     10-23    144  |  105  |  25<H>  ----------------------------<  83  4.0   |  21<L>  |  0.94    Ca    9.0      23 Oct 2022 07:35  Phos  3.4     10-23  Mg     2.1     10-23    TPro  7.2  /  Alb  3.6  /  TBili  0.2  /  DBili  x   /  AST  18  /  ALT  8<L>  /  AlkPhos  81  10-22                       ACS DAILY PROGRESS NOTE:       SUBJECTIVE/ROS: No acute events overnight. Patient denies pain. Unable to cooperate to do incentive spirometry.       MEDICATIONS  (STANDING):  acetaminophen   IVPB .. 750 milliGRAM(s) IV Intermittent every 6 hours  artificial  tears Solution 1 Drop(s) Both EYES two times a day  busPIRone 7.5 milliGRAM(s) Oral every 12 hours  citalopram 20 milliGRAM(s) Oral daily  heparin   Injectable 5000 Unit(s) SubCutaneous every 12 hours  lidocaine   4% Patch 1 Patch Transdermal every 24 hours  metoprolol tartrate 12.5 milliGRAM(s) Oral every 12 hours  polyethylene glycol 3350 17 Gram(s) Oral daily  senna 2 Tablet(s) Oral at bedtime  traZODone 100 milliGRAM(s) Oral at bedtime    MEDICATIONS  (PRN):  oxyCODONE    IR 2.5 milliGRAM(s) Oral every 6 hours PRN Moderate Pain (4 - 6)      OBJECTIVE:    Vital Signs Last 24 Hrs  T(C): 36.9 (24 Oct 2022 01:00), Max: 37.7 (23 Oct 2022 18:52)  T(F): 98.5 (24 Oct 2022 01:00), Max: 99.9 (23 Oct 2022 18:52)  HR: 76 (24 Oct 2022 01:00) (53 - 77)  BP: 119/70 (24 Oct 2022 01:00) (112/65 - 190/96)  BP(mean): --  RR: 18 (24 Oct 2022 01:00) (18 - 19)  SpO2: 95% (24 Oct 2022 01:00) (92% - 99%)    Parameters below as of 24 Oct 2022 01:00  Patient On (Oxygen Delivery Method): room air            I&O's Detail    22 Oct 2022 07:01  -  23 Oct 2022 07:00  --------------------------------------------------------  IN:  Total IN: 0 mL    OUT:    Voided (mL): 850 mL  Total OUT: 850 mL    Total NET: -850 mL      23 Oct 2022 07:01  -  24 Oct 2022 03:16  --------------------------------------------------------  IN:    IV PiggyBack: 150 mL    Oral Fluid: 200 mL  Total IN: 350 mL    OUT:    Oral Fluid: 0 mL  Total OUT: 0 mL    Total NET: 350 mL        LABS:                        9.3    14.67 )-----------( 310      ( 23 Oct 2022 07:38 )             29.9     10-23    144  |  105  |  25<H>  ----------------------------<  83  4.0   |  21<L>  |  0.94    Ca    9.0      23 Oct 2022 07:35  Phos  3.4     10-23  Mg     2.1     10-23    TPro  7.2  /  Alb  3.6  /  TBili  0.2  /  DBili  x   /  AST  18  /  ALT  8<L>  /  AlkPhos  81  10-22    PHYSICAL EXAM:  Constitutional: NAD  Respiratory: Unlabored breathing  Abdomen: Soft, nondistended, NTTP. No rebound or guarding.  Extremities: WWP, SONG spontaneously               ACS DAILY PROGRESS NOTE:       SUBJECTIVE/ROS: No acute events overnight. Patient denies pain. Subjective limited.       MEDICATIONS  (STANDING):  acetaminophen   IVPB .. 750 milliGRAM(s) IV Intermittent every 6 hours  artificial  tears Solution 1 Drop(s) Both EYES two times a day  busPIRone 7.5 milliGRAM(s) Oral every 12 hours  citalopram 20 milliGRAM(s) Oral daily  heparin   Injectable 5000 Unit(s) SubCutaneous every 12 hours  lidocaine   4% Patch 1 Patch Transdermal every 24 hours  metoprolol tartrate 12.5 milliGRAM(s) Oral every 12 hours  polyethylene glycol 3350 17 Gram(s) Oral daily  senna 2 Tablet(s) Oral at bedtime  traZODone 100 milliGRAM(s) Oral at bedtime    MEDICATIONS  (PRN):  oxyCODONE    IR 2.5 milliGRAM(s) Oral every 6 hours PRN Moderate Pain (4 - 6)      OBJECTIVE:    Vital Signs Last 24 Hrs  T(C): 36.9 (24 Oct 2022 01:00), Max: 37.7 (23 Oct 2022 18:52)  T(F): 98.5 (24 Oct 2022 01:00), Max: 99.9 (23 Oct 2022 18:52)  HR: 76 (24 Oct 2022 01:00) (53 - 77)  BP: 119/70 (24 Oct 2022 01:00) (112/65 - 190/96)  BP(mean): --  RR: 18 (24 Oct 2022 01:00) (18 - 19)  SpO2: 95% (24 Oct 2022 01:00) (92% - 99%)    Parameters below as of 24 Oct 2022 01:00  Patient On (Oxygen Delivery Method): room air            I&O's Detail    22 Oct 2022 07:01  -  23 Oct 2022 07:00  --------------------------------------------------------  IN:  Total IN: 0 mL    OUT:    Voided (mL): 850 mL  Total OUT: 850 mL    Total NET: -850 mL      23 Oct 2022 07:01  -  24 Oct 2022 03:16  --------------------------------------------------------  IN:    IV PiggyBack: 150 mL    Oral Fluid: 200 mL  Total IN: 350 mL    OUT:    Oral Fluid: 0 mL  Total OUT: 0 mL    Total NET: 350 mL        LABS:                        9.3    14.67 )-----------( 310      ( 23 Oct 2022 07:38 )             29.9     10-23    144  |  105  |  25<H>  ----------------------------<  83  4.0   |  21<L>  |  0.94    Ca    9.0      23 Oct 2022 07:35  Phos  3.4     10-23  Mg     2.1     10-23    TPro  7.2  /  Alb  3.6  /  TBili  0.2  /  DBili  x   /  AST  18  /  ALT  8<L>  /  AlkPhos  81  10-22    PHYSICAL EXAM:  Constitutional: NAD  Respiratory: Unlabored breathing  Abdomen: Soft, nondistended, NTTP. No rebound or guarding.  Extremities: WWP, SONG spontaneously

## 2022-10-25 LAB
ANION GAP SERPL CALC-SCNC: 16 MMOL/L — SIGNIFICANT CHANGE UP (ref 5–17)
BUN SERPL-MCNC: 26 MG/DL — HIGH (ref 7–23)
CALCIUM SERPL-MCNC: 8.5 MG/DL — SIGNIFICANT CHANGE UP (ref 8.4–10.5)
CHLORIDE SERPL-SCNC: 108 MMOL/L — SIGNIFICANT CHANGE UP (ref 96–108)
CO2 SERPL-SCNC: 23 MMOL/L — SIGNIFICANT CHANGE UP (ref 22–31)
CREAT SERPL-MCNC: 0.9 MG/DL — SIGNIFICANT CHANGE UP (ref 0.5–1.3)
EGFR: 57 ML/MIN/1.73M2 — LOW
GLUCOSE SERPL-MCNC: 95 MG/DL — SIGNIFICANT CHANGE UP (ref 70–99)
HCT VFR BLD CALC: 29 % — LOW (ref 34.5–45)
HGB BLD-MCNC: 9.3 G/DL — LOW (ref 11.5–15.5)
MAGNESIUM SERPL-MCNC: 2.1 MG/DL — SIGNIFICANT CHANGE UP (ref 1.6–2.6)
MCHC RBC-ENTMCNC: 29.1 PG — SIGNIFICANT CHANGE UP (ref 27–34)
MCHC RBC-ENTMCNC: 32.1 GM/DL — SIGNIFICANT CHANGE UP (ref 32–36)
MCV RBC AUTO: 90.6 FL — SIGNIFICANT CHANGE UP (ref 80–100)
NRBC # BLD: 0 /100 WBCS — SIGNIFICANT CHANGE UP (ref 0–0)
NT-PROBNP SERPL-SCNC: 5348 PG/ML — HIGH (ref 0–300)
PHOSPHATE SERPL-MCNC: 4.1 MG/DL — SIGNIFICANT CHANGE UP (ref 2.5–4.5)
PLATELET # BLD AUTO: 314 K/UL — SIGNIFICANT CHANGE UP (ref 150–400)
POTASSIUM SERPL-MCNC: 3.9 MMOL/L — SIGNIFICANT CHANGE UP (ref 3.5–5.3)
POTASSIUM SERPL-SCNC: 3.9 MMOL/L — SIGNIFICANT CHANGE UP (ref 3.5–5.3)
RBC # BLD: 3.2 M/UL — LOW (ref 3.8–5.2)
RBC # FLD: 14.4 % — SIGNIFICANT CHANGE UP (ref 10.3–14.5)
SODIUM SERPL-SCNC: 147 MMOL/L — HIGH (ref 135–145)
WBC # BLD: 14.59 K/UL — HIGH (ref 3.8–10.5)
WBC # FLD AUTO: 14.59 K/UL — HIGH (ref 3.8–10.5)

## 2022-10-25 PROCEDURE — 99233 SBSQ HOSP IP/OBS HIGH 50: CPT

## 2022-10-25 PROCEDURE — 99232 SBSQ HOSP IP/OBS MODERATE 35: CPT

## 2022-10-25 RX ORDER — LOSARTAN POTASSIUM 100 MG/1
50 TABLET, FILM COATED ORAL DAILY
Refills: 0 | Status: DISCONTINUED | OUTPATIENT
Start: 2022-10-25 | End: 2022-10-27

## 2022-10-25 RX ORDER — NIFEDIPINE 30 MG
60 TABLET, EXTENDED RELEASE 24 HR ORAL DAILY
Refills: 0 | Status: DISCONTINUED | OUTPATIENT
Start: 2022-10-25 | End: 2022-10-25

## 2022-10-25 RX ORDER — NIFEDIPINE 30 MG
60 TABLET, EXTENDED RELEASE 24 HR ORAL DAILY
Refills: 0 | Status: DISCONTINUED | OUTPATIENT
Start: 2022-10-25 | End: 2022-10-27

## 2022-10-25 RX ORDER — ACETAMINOPHEN 500 MG
650 TABLET ORAL EVERY 6 HOURS
Refills: 0 | Status: DISCONTINUED | OUTPATIENT
Start: 2022-10-25 | End: 2022-10-31

## 2022-10-25 RX ORDER — HYDRALAZINE HCL 50 MG
5 TABLET ORAL ONCE
Refills: 0 | Status: COMPLETED | OUTPATIENT
Start: 2022-10-25 | End: 2022-10-25

## 2022-10-25 RX ORDER — METOPROLOL TARTRATE 50 MG
25 TABLET ORAL DAILY
Refills: 0 | Status: DISCONTINUED | OUTPATIENT
Start: 2022-10-25 | End: 2022-10-27

## 2022-10-25 RX ORDER — SODIUM CHLORIDE 9 MG/ML
1000 INJECTION, SOLUTION INTRAVENOUS
Refills: 0 | Status: DISCONTINUED | OUTPATIENT
Start: 2022-10-25 | End: 2022-10-27

## 2022-10-25 RX ORDER — ACETAMINOPHEN 500 MG
2 TABLET ORAL
Qty: 0 | Refills: 0 | DISCHARGE
Start: 2022-10-25

## 2022-10-25 RX ADMIN — CITALOPRAM 20 MILLIGRAM(S): 10 TABLET, FILM COATED ORAL at 12:34

## 2022-10-25 RX ADMIN — Medication 650 MILLIGRAM(S): at 05:29

## 2022-10-25 RX ADMIN — HEPARIN SODIUM 5000 UNIT(S): 5000 INJECTION INTRAVENOUS; SUBCUTANEOUS at 18:09

## 2022-10-25 RX ADMIN — Medication 7.5 MILLIGRAM(S): at 23:06

## 2022-10-25 RX ADMIN — Medication 60 MILLIGRAM(S): at 12:34

## 2022-10-25 RX ADMIN — PIPERACILLIN AND TAZOBACTAM 25 GRAM(S): 4; .5 INJECTION, POWDER, LYOPHILIZED, FOR SOLUTION INTRAVENOUS at 12:34

## 2022-10-25 RX ADMIN — PIPERACILLIN AND TAZOBACTAM 25 GRAM(S): 4; .5 INJECTION, POWDER, LYOPHILIZED, FOR SOLUTION INTRAVENOUS at 05:02

## 2022-10-25 RX ADMIN — POLYETHYLENE GLYCOL 3350 17 GRAM(S): 17 POWDER, FOR SOLUTION ORAL at 12:34

## 2022-10-25 RX ADMIN — Medication 100 MILLIGRAM(S): at 21:13

## 2022-10-25 RX ADMIN — Medication 650 MILLIGRAM(S): at 05:59

## 2022-10-25 RX ADMIN — HEPARIN SODIUM 5000 UNIT(S): 5000 INJECTION INTRAVENOUS; SUBCUTANEOUS at 05:06

## 2022-10-25 RX ADMIN — SODIUM CHLORIDE 85 MILLILITER(S): 9 INJECTION, SOLUTION INTRAVENOUS at 16:01

## 2022-10-25 RX ADMIN — SODIUM CHLORIDE 85 MILLILITER(S): 9 INJECTION, SOLUTION INTRAVENOUS at 16:03

## 2022-10-25 RX ADMIN — LIDOCAINE 1 PATCH: 4 CREAM TOPICAL at 23:41

## 2022-10-25 RX ADMIN — SENNA PLUS 2 TABLET(S): 8.6 TABLET ORAL at 21:13

## 2022-10-25 RX ADMIN — Medication 1 DROP(S): at 05:06

## 2022-10-25 RX ADMIN — Medication 7.5 MILLIGRAM(S): at 12:34

## 2022-10-25 RX ADMIN — Medication 25 MILLIGRAM(S): at 12:34

## 2022-10-25 RX ADMIN — LOSARTAN POTASSIUM 50 MILLIGRAM(S): 100 TABLET, FILM COATED ORAL at 10:23

## 2022-10-25 RX ADMIN — Medication 1 DROP(S): at 18:08

## 2022-10-25 RX ADMIN — Medication 5 MILLIGRAM(S): at 05:01

## 2022-10-25 RX ADMIN — PIPERACILLIN AND TAZOBACTAM 25 GRAM(S): 4; .5 INJECTION, POWDER, LYOPHILIZED, FOR SOLUTION INTRAVENOUS at 21:18

## 2022-10-25 NOTE — PROGRESS NOTE ADULT - ASSESSMENT
89F hx dementia, anxiety/depression and HTN presenting from [facility] with weakness and encephalopathy, found to have L displaced 4-6 rib fractures suggesting a preceding trauma or fall, unclear if syncopal vs. mechanical, found also to have citrobacter UTI and uptrending white count despite appropriate antibiotics concerning for concomitant PNA.     1. Rib fractures- L sided displaced rib fractures suggest a preceding trauma or fall. Unclear if fall would have been syncopal or mechanical, but in the setting of active infections, syncopal from hypovolemia is possible.  - recommend tele monitoring at least x 24 hours  - s/p ceftriaxone for UTI, would start zosyn for PNA seen on CXR  - IVF on 10/24 for dry mucous membranes and likely poor PO intake  - no clinical evidence of overload, would check pro-BNP  - check orthostatics when able  - PT-eval    2. HTN- 's. Goal SBP for age would be 130-140's  - cont metop 12.5mg po bid- would transition to toprol xl 25mg po daily when able  - may resume nifedipine 60mg po daily  - resumed losartan 50mg po daily    3. Leukocytosis- uptrended white count and positive Ucx growing citrobacter pansensitive  - s/p ceftriaxone x 3 days  - white count continued to uptrend- review of CXR reveals L retrocardiac opacity, possible PNA  - will start zosyn 3.375gm iv q 8 hours for presumed PNA- anticipate 5-7 day treatment (through 10/19)  - may transition to augmentin on dc if shows response  - cont trending CBC    4. anxiety/depression- cont home buspirone, celexa, trazodone  - cont holding klonopin  - may resume mirtazapine if tolerating the above    dispo: pending improvement in white count, final abx plan, orthostatics check, PT eval   reached out to PCP office and requested outside records for review- awaiting fax

## 2022-10-25 NOTE — PROGRESS NOTE ADULT - SUBJECTIVE AND OBJECTIVE BOX
Chuck Narayanan MD  Division of Hospital Medicine  Available on MS teams until 7pm  If no response or off-hours, page [phone]  -------------------------------------    Patient is a 89y old  Female who presents with a chief complaint of fall (24 Oct 2022 15:36)      SUBJECTIVE / OVERNIGHT EVENTS: none acute  ADDITIONAL REVIEW OF SYSTEMS: pt more awake and alert today, still slow to respond/answer questions but denies aches/pains, states she's comfortable, ros otherwise limited by dementia.     MEDICATIONS  (STANDING):  acetaminophen   IVPB .. 750 milliGRAM(s) IV Intermittent every 6 hours  artificial  tears Solution 1 Drop(s) Both EYES two times a day  busPIRone 7.5 milliGRAM(s) Oral every 12 hours  citalopram 20 milliGRAM(s) Oral daily  heparin   Injectable 5000 Unit(s) SubCutaneous every 12 hours  lactated ringers. 1000 milliLiter(s) (85 mL/Hr) IV Continuous <Continuous>  lidocaine   4% Patch 1 Patch Transdermal every 24 hours  losartan 50 milliGRAM(s) Oral daily  metoprolol succinate ER 25 milliGRAM(s) Oral daily  NIFEdipine XL 60 milliGRAM(s) Oral daily  piperacillin/tazobactam IVPB.. 3.375 Gram(s) IV Intermittent every 8 hours  polyethylene glycol 3350 17 Gram(s) Oral daily  senna 2 Tablet(s) Oral at bedtime  traZODone 100 milliGRAM(s) Oral at bedtime    MEDICATIONS  (PRN):  acetaminophen     Tablet .. 650 milliGRAM(s) Oral every 6 hours PRN Mild Pain (1 - 3)  oxyCODONE    IR 2.5 milliGRAM(s) Oral every 6 hours PRN Moderate Pain (4 - 6)      CAPILLARY BLOOD GLUCOSE      POCT Blood Glucose.: 138 mg/dL (24 Oct 2022 17:23)    I&O's Summary    24 Oct 2022 07:01  -  25 Oct 2022 07:00  --------------------------------------------------------  IN: 1670 mL / OUT: 300 mL / NET: 1370 mL        PHYSICAL EXAM:  Vital Signs Last 24 Hrs  T(C): 37.1 (25 Oct 2022 09:15), Max: 37.8 (24 Oct 2022 17:00)  T(F): 98.8 (25 Oct 2022 09:15), Max: 100 (24 Oct 2022 17:00)  HR: 68 (25 Oct 2022 09:15) (55 - 76)  BP: 176/69 (25 Oct 2022 09:15) (132/78 - 182/73)  BP(mean): --  RR: 18 (25 Oct 2022 09:15) (18 - 18)  SpO2: 94% (25 Oct 2022 09:15) (93% - 95%)    Parameters below as of 25 Oct 2022 09:15  Patient On (Oxygen Delivery Method): room air      CONSTITUTIONAL: NAD  EYES: PERRLA; conjunctiva and sclera clear  ENMT: MMM  NECK: Supple  RESPIRATORY: Normal respiratory effort; CTAB  CARDIOVASCULAR: RRR, no JVD, no peripheral edema   ABDOMEN: Nontender to palpation, normoactive BS, no guarding/rigidity  MUSCLOSKELETAL:  no clubbing/cyanosis, no joint swelling or tenderness to palpation  PSYCH: A+O x 1, affect blunted  NEUROLOGY: CN 2-12 are intact and symmetric; no gross sensory or motor deficits  SKIN: No rashes; no palpable lesions    LABS:                        9.3    14.59 )-----------( 314      ( 25 Oct 2022 07:38 )             29.0     10-24    144  |  105  |  28<H>  ----------------------------<  113<H>  3.7   |  22  |  0.97    Ca    9.0      24 Oct 2022 07:25  Phos  2.8     10-24  Mg     2.2     10-24                  RADIOLOGY & ADDITIONAL TESTS:  Results Reviewed:   Imaging Personally Reviewed:  Electrocardiogram Personally Reviewed:    COORDINATION OF CARE:  Care Discussed with Consultants/Other Providers [Y/N]: trauma team  Prior or Outpatient Records Reviewed [Y/N]:

## 2022-10-25 NOTE — PROGRESS NOTE ADULT - SUBJECTIVE AND OBJECTIVE BOX
TRAUMA DAILY PROGRESS NOTE:     SUBJECTIVE/ROS:  No acute events overnight. Patient resting comfortably.          MEDICATIONS  (STANDING):  acetaminophen   IVPB .. 750 milliGRAM(s) IV Intermittent every 6 hours  artificial  tears Solution 1 Drop(s) Both EYES two times a day  busPIRone 7.5 milliGRAM(s) Oral every 12 hours  citalopram 20 milliGRAM(s) Oral daily  heparin   Injectable 5000 Unit(s) SubCutaneous every 12 hours  lactated ringers. 1000 milliLiter(s) (85 mL/Hr) IV Continuous <Continuous>  lidocaine   4% Patch 1 Patch Transdermal every 24 hours  metoprolol succinate ER 25 milliGRAM(s) Oral daily  NIFEdipine XL 60 milliGRAM(s) Oral daily  piperacillin/tazobactam IVPB.. 3.375 Gram(s) IV Intermittent every 8 hours  polyethylene glycol 3350 17 Gram(s) Oral daily  senna 2 Tablet(s) Oral at bedtime  traZODone 100 milliGRAM(s) Oral at bedtime    MEDICATIONS  (PRN):  acetaminophen     Tablet .. 650 milliGRAM(s) Oral every 6 hours PRN Mild Pain (1 - 3)  oxyCODONE    IR 2.5 milliGRAM(s) Oral every 6 hours PRN Moderate Pain (4 - 6)      OBJECTIVE:    Vital Signs Last 24 Hrs  T(C): 37.7 (25 Oct 2022 04:29), Max: 37.8 (24 Oct 2022 08:44)  T(F): 99.8 (25 Oct 2022 04:29), Max: 100 (24 Oct 2022 08:44)  HR: 76 (25 Oct 2022 05:40) (55 - 76)  BP: 145/86 (25 Oct 2022 05:40) (132/78 - 182/73)  BP(mean): --  RR: 18 (25 Oct 2022 04:29) (18 - 18)  SpO2: 93% (25 Oct 2022 04:29) (93% - 96%)    Parameters below as of 25 Oct 2022 04:29  Patient On (Oxygen Delivery Method): room air            I&O's Detail    24 Oct 2022 07:01  -  25 Oct 2022 07:00  --------------------------------------------------------  IN:    IV PiggyBack: 450 mL    Lactated Ringers: 1020 mL    Oral Fluid: 200 mL  Total IN: 1670 mL    OUT:    Voided (mL): 300 mL  Total OUT: 300 mL    Total NET: 1370 mL          Daily     Daily     LABS:                        9.6    15.55 )-----------( 332      ( 24 Oct 2022 07:20 )             31.1     10-24    144  |  105  |  28<H>  ----------------------------<  113<H>  3.7   |  22  |  0.97    Ca    9.0      24 Oct 2022 07:25  Phos  2.8     10-24  Mg     2.2     10-24            PHYSICAL EXAM:  Constitutional: NAD  Respiratory: Unlabored breathing  Abdomen: Soft, nondistended, NTTP. No rebound or guarding.  Extremities: WWP, SONG spontaneously                  Assessment and Plan:   · Assessment	  89 yo female with a PMHx of HTN, presents from [facility] where she was lethargic and weak since last night and hypoxic since this morning. Found to have acute displaced fractures of the left fourth through sixth ribs.    Plan:  - Multimodal pain control for rib fractues  -zosyn for PNA on xray   - Will DC haldol/clonazepam/other sedating medications in effort to reduce drowsiness  -metoprolol 25xl qd  nifedipine restarted  -appreciate Medicine recs   - DVT ppx  - puree diet  - OOB  - IS  - [facility]  [phone]

## 2022-10-26 LAB
ANION GAP SERPL CALC-SCNC: 13 MMOL/L — SIGNIFICANT CHANGE UP (ref 5–17)
ANION GAP SERPL CALC-SCNC: 14 MMOL/L — SIGNIFICANT CHANGE UP (ref 5–17)
BUN SERPL-MCNC: 12 MG/DL — SIGNIFICANT CHANGE UP (ref 7–23)
BUN SERPL-MCNC: 16 MG/DL — SIGNIFICANT CHANGE UP (ref 7–23)
CALCIUM SERPL-MCNC: 8 MG/DL — LOW (ref 8.4–10.5)
CALCIUM SERPL-MCNC: 8.4 MG/DL — SIGNIFICANT CHANGE UP (ref 8.4–10.5)
CHLORIDE SERPL-SCNC: 103 MMOL/L — SIGNIFICANT CHANGE UP (ref 96–108)
CHLORIDE SERPL-SCNC: 104 MMOL/L — SIGNIFICANT CHANGE UP (ref 96–108)
CO2 SERPL-SCNC: 21 MMOL/L — LOW (ref 22–31)
CO2 SERPL-SCNC: 23 MMOL/L — SIGNIFICANT CHANGE UP (ref 22–31)
CREAT SERPL-MCNC: 0.66 MG/DL — SIGNIFICANT CHANGE UP (ref 0.5–1.3)
CREAT SERPL-MCNC: 0.72 MG/DL — SIGNIFICANT CHANGE UP (ref 0.5–1.3)
EGFR: 75 ML/MIN/1.73M2 — SIGNIFICANT CHANGE UP
EGFR: 79 ML/MIN/1.73M2 — SIGNIFICANT CHANGE UP
GLUCOSE SERPL-MCNC: 124 MG/DL — HIGH (ref 70–99)
GLUCOSE SERPL-MCNC: 154 MG/DL — HIGH (ref 70–99)
HCT VFR BLD CALC: 29.3 % — LOW (ref 34.5–45)
HGB BLD-MCNC: 9.5 G/DL — LOW (ref 11.5–15.5)
MAGNESIUM SERPL-MCNC: 1.9 MG/DL — SIGNIFICANT CHANGE UP (ref 1.6–2.6)
MCHC RBC-ENTMCNC: 29 PG — SIGNIFICANT CHANGE UP (ref 27–34)
MCHC RBC-ENTMCNC: 32.4 GM/DL — SIGNIFICANT CHANGE UP (ref 32–36)
MCV RBC AUTO: 89.3 FL — SIGNIFICANT CHANGE UP (ref 80–100)
NRBC # BLD: 0 /100 WBCS — SIGNIFICANT CHANGE UP (ref 0–0)
PHOSPHATE SERPL-MCNC: 2.1 MG/DL — LOW (ref 2.5–4.5)
PHOSPHATE SERPL-MCNC: 3.3 MG/DL — SIGNIFICANT CHANGE UP (ref 2.5–4.5)
PLATELET # BLD AUTO: 319 K/UL — SIGNIFICANT CHANGE UP (ref 150–400)
POTASSIUM SERPL-MCNC: 3 MMOL/L — LOW (ref 3.5–5.3)
POTASSIUM SERPL-MCNC: 3.5 MMOL/L — SIGNIFICANT CHANGE UP (ref 3.5–5.3)
POTASSIUM SERPL-SCNC: 3 MMOL/L — LOW (ref 3.5–5.3)
POTASSIUM SERPL-SCNC: 3.5 MMOL/L — SIGNIFICANT CHANGE UP (ref 3.5–5.3)
RBC # BLD: 3.28 M/UL — LOW (ref 3.8–5.2)
RBC # FLD: 14.6 % — HIGH (ref 10.3–14.5)
SODIUM SERPL-SCNC: 138 MMOL/L — SIGNIFICANT CHANGE UP (ref 135–145)
SODIUM SERPL-SCNC: 140 MMOL/L — SIGNIFICANT CHANGE UP (ref 135–145)
WBC # BLD: 14.11 K/UL — HIGH (ref 3.8–10.5)
WBC # FLD AUTO: 14.11 K/UL — HIGH (ref 3.8–10.5)

## 2022-10-26 PROCEDURE — 99232 SBSQ HOSP IP/OBS MODERATE 35: CPT

## 2022-10-26 PROCEDURE — 74018 RADEX ABDOMEN 1 VIEW: CPT | Mod: 26

## 2022-10-26 RX ORDER — POTASSIUM PHOSPHATE, MONOBASIC POTASSIUM PHOSPHATE, DIBASIC 236; 224 MG/ML; MG/ML
30 INJECTION, SOLUTION INTRAVENOUS ONCE
Refills: 0 | Status: COMPLETED | OUTPATIENT
Start: 2022-10-26 | End: 2022-10-26

## 2022-10-26 RX ORDER — ONDANSETRON 8 MG/1
4 TABLET, FILM COATED ORAL ONCE
Refills: 0 | Status: COMPLETED | OUTPATIENT
Start: 2022-10-26 | End: 2022-10-26

## 2022-10-26 RX ORDER — HYDRALAZINE HCL 50 MG
5 TABLET ORAL ONCE
Refills: 0 | Status: COMPLETED | OUTPATIENT
Start: 2022-10-26 | End: 2022-10-26

## 2022-10-26 RX ORDER — POTASSIUM CHLORIDE 20 MEQ
10 PACKET (EA) ORAL
Refills: 0 | Status: COMPLETED | OUTPATIENT
Start: 2022-10-26 | End: 2022-10-26

## 2022-10-26 RX ADMIN — Medication 5 MILLIGRAM(S): at 21:31

## 2022-10-26 RX ADMIN — Medication 25 MILLIGRAM(S): at 05:53

## 2022-10-26 RX ADMIN — Medication 60 MILLIGRAM(S): at 05:52

## 2022-10-26 RX ADMIN — PIPERACILLIN AND TAZOBACTAM 25 GRAM(S): 4; .5 INJECTION, POWDER, LYOPHILIZED, FOR SOLUTION INTRAVENOUS at 05:51

## 2022-10-26 RX ADMIN — POLYETHYLENE GLYCOL 3350 17 GRAM(S): 17 POWDER, FOR SOLUTION ORAL at 11:32

## 2022-10-26 RX ADMIN — PIPERACILLIN AND TAZOBACTAM 25 GRAM(S): 4; .5 INJECTION, POWDER, LYOPHILIZED, FOR SOLUTION INTRAVENOUS at 13:23

## 2022-10-26 RX ADMIN — LIDOCAINE 1 PATCH: 4 CREAM TOPICAL at 07:47

## 2022-10-26 RX ADMIN — Medication 7.5 MILLIGRAM(S): at 23:47

## 2022-10-26 RX ADMIN — LIDOCAINE 1 PATCH: 4 CREAM TOPICAL at 11:48

## 2022-10-26 RX ADMIN — Medication 100 MILLIEQUIVALENT(S): at 13:29

## 2022-10-26 RX ADMIN — Medication 100 MILLIGRAM(S): at 23:48

## 2022-10-26 RX ADMIN — HEPARIN SODIUM 5000 UNIT(S): 5000 INJECTION INTRAVENOUS; SUBCUTANEOUS at 05:52

## 2022-10-26 RX ADMIN — SODIUM CHLORIDE 85 MILLILITER(S): 9 INJECTION, SOLUTION INTRAVENOUS at 18:16

## 2022-10-26 RX ADMIN — PIPERACILLIN AND TAZOBACTAM 25 GRAM(S): 4; .5 INJECTION, POWDER, LYOPHILIZED, FOR SOLUTION INTRAVENOUS at 22:08

## 2022-10-26 RX ADMIN — CITALOPRAM 20 MILLIGRAM(S): 10 TABLET, FILM COATED ORAL at 11:31

## 2022-10-26 RX ADMIN — Medication 100 MILLIEQUIVALENT(S): at 11:32

## 2022-10-26 RX ADMIN — Medication 1 DROP(S): at 05:51

## 2022-10-26 RX ADMIN — SENNA PLUS 2 TABLET(S): 8.6 TABLET ORAL at 23:47

## 2022-10-26 RX ADMIN — Medication 100 MILLIEQUIVALENT(S): at 16:12

## 2022-10-26 RX ADMIN — HEPARIN SODIUM 5000 UNIT(S): 5000 INJECTION INTRAVENOUS; SUBCUTANEOUS at 17:24

## 2022-10-26 RX ADMIN — POTASSIUM PHOSPHATE, MONOBASIC POTASSIUM PHOSPHATE, DIBASIC 83.33 MILLIMOLE(S): 236; 224 INJECTION, SOLUTION INTRAVENOUS at 10:31

## 2022-10-26 RX ADMIN — LOSARTAN POTASSIUM 50 MILLIGRAM(S): 100 TABLET, FILM COATED ORAL at 05:52

## 2022-10-26 RX ADMIN — Medication 7.5 MILLIGRAM(S): at 13:23

## 2022-10-26 RX ADMIN — ONDANSETRON 4 MILLIGRAM(S): 8 TABLET, FILM COATED ORAL at 23:16

## 2022-10-26 RX ADMIN — Medication 1 DROP(S): at 17:24

## 2022-10-26 NOTE — SWALLOW BEDSIDE ASSESSMENT ADULT - ADDITIONAL RECOMMENDATIONS
-Should patient/caregiver opt for PO comfort feeding, the least harmful PO diet would be moderately thick liquids with puree textures via 1/2 tsp amounts   -Reduce risk for aspiration PNA via oral hygiene strategies    GOAL- Pt to tolerate recommended textures during course w/ no s/sx of aspiration

## 2022-10-26 NOTE — PROGRESS NOTE ADULT - ASSESSMENT
89F hx dementia, anxiety/depression and HTN presenting from [facility] with weakness and encephalopathy, found to have L displaced 4-6 rib fractures suggesting a preceding trauma or fall, unclear if syncopal vs. mechanical, found also to have citrobacter UTI and uptrending white count despite appropriate antibiotics concerning for concomitant PNA.     1. Rib fractures- L sided displaced rib fractures suggest a preceding trauma or fall. Unclear if fall would have been syncopal or mechanical, but in the setting of active infections, syncopal from hypovolemia is possible. It appears fall was unwitnessed and not noticed as patient may have gotten herself up and not notified anyone.   - recommend tele monitoring at least x 24 hours  - s/p ceftriaxone for UTI, would start zosyn for PNA seen on CXR- anticipate 5-7 day course, to likely complete at rehab  - check orthostatics when able  - PT-eval- rec TEENA. Family would like to send to [facility] with transition to LTC and eventual hospice    2. HTN- 's. Goal SBP for age would be 130-140's  - cont metop 12.5mg po bid- would transition to toprol xl 25mg po daily when able  - may resume nifedipine 60mg po daily  - resumed losartan 50mg po daily    3. Leukocytosis- uptrended white count and positive Ucx growing citrobacter pansensitive  - s/p ceftriaxone x 3 days  - white count continued to uptrend- review of CXR reveals L retrocardiac opacity, possible PNA  - will start zosyn 3.375gm iv q 8 hours for presumed PNA- anticipate treatment (through 10/29)  - would favor completing IV abx course at rehab if able given patient's questionable swallowing  - cont trending CBC    4. anxiety/depression- cont home buspirone, celexa, trazodone  - cont holding klonopin  - may resume mirtazapine if tolerating the above    5. Dysphagia- poor swallow function, likely aspiration PNA. Per s/s patient is not a candidate for oral feeds.  - discussed with HCP Sierra Blackburn- pt to continue pleasure feeds, accepts risk of future aspiration and decompensation  - GOC as above    dispo: pending rehab placement

## 2022-10-26 NOTE — PROGRESS NOTE ADULT - CONVERSATION DETAILS
Discussed with nephew Alexey (HCP) pt's prognosis and plans. He states pt had been verbalizing prior to hospitalization that she would like to focus on comfort and did not want recurrent hospitalizations. She is DNR/DNI with no plans to rehospitalize. They are interested in transfer to rehab with eventual transition to LTC and hospice when the time is appropriate.     MOLST filled out, copy in chart, original left at bedside.

## 2022-10-26 NOTE — PROGRESS NOTE ADULT - SUBJECTIVE AND OBJECTIVE BOX
ACS DAILY PROGRESS NOTE:       SUBJECTIVE/ROS: No acute events overnight.  Patient feels well  Denies nausea, vomiting, chest pain, shortness of breath         MEDICATIONS  (STANDING):  acetaminophen   IVPB .. 750 milliGRAM(s) IV Intermittent every 6 hours  artificial  tears Solution 1 Drop(s) Both EYES two times a day  busPIRone 7.5 milliGRAM(s) Oral every 12 hours  citalopram 20 milliGRAM(s) Oral daily  dextrose 5% + sodium chloride 0.45%. 1000 milliLiter(s) (85 mL/Hr) IV Continuous <Continuous>  heparin   Injectable 5000 Unit(s) SubCutaneous every 12 hours  lidocaine   4% Patch 1 Patch Transdermal every 24 hours  losartan 50 milliGRAM(s) Oral daily  metoprolol succinate ER 25 milliGRAM(s) Oral daily  NIFEdipine XL 60 milliGRAM(s) Oral daily  piperacillin/tazobactam IVPB.. 3.375 Gram(s) IV Intermittent every 8 hours  polyethylene glycol 3350 17 Gram(s) Oral daily  senna 2 Tablet(s) Oral at bedtime  traZODone 100 milliGRAM(s) Oral at bedtime    MEDICATIONS  (PRN):  acetaminophen     Tablet .. 650 milliGRAM(s) Oral every 6 hours PRN Mild Pain (1 - 3)  oxyCODONE    IR 2.5 milliGRAM(s) Oral every 6 hours PRN Moderate Pain (4 - 6)      OBJECTIVE:    Vital Signs Last 24 Hrs  T(C): 37.4 (26 Oct 2022 00:06), Max: 37.9 (25 Oct 2022 17:19)  T(F): 99.3 (26 Oct 2022 00:06), Max: 100.2 (25 Oct 2022 17:19)  HR: 70 (26 Oct 2022 00:06) (60 - 78)  BP: 161/80 (26 Oct 2022 00:06) (121/55 - 182/73)  BP(mean): --  RR: 18 (26 Oct 2022 00:06) (18 - 18)  SpO2: 94% (26 Oct 2022 00:06) (93% - 97%)    Parameters below as of 26 Oct 2022 00:06  Patient On (Oxygen Delivery Method): room air    PHYSICAL EXAM:  Constitutional: NAD  Respiratory: Unlabored breathing  Abdomen: Soft, nondistended, NTTP. No rebound or guarding.  Extremities: WWP, SONG spontaneously        I&O's Detail    24 Oct 2022 07:01  -  25 Oct 2022 07:00  --------------------------------------------------------  IN:    IV PiggyBack: 450 mL    Lactated Ringers: 1020 mL    Oral Fluid: 200 mL  Total IN: 1670 mL    OUT:    Voided (mL): 300 mL  Total OUT: 300 mL    Total NET: 1370 mL      25 Oct 2022 07:01  -  26 Oct 2022 02:42  --------------------------------------------------------  IN:    dextrose 5% + sodium chloride 0.45%: 298 mL    IV PiggyBack: 200 mL    Lactated Ringers: 723 mL  Total IN: 1221 mL    OUT:    Oral Fluid: 0 mL    Voided (mL): 600 mL  Total OUT: 600 mL    Total NET: 621 mL          Daily     Daily     LABS:                        9.3    14.59 )-----------( 314      ( 25 Oct 2022 07:38 )             29.0     10-25    147<H>  |  108  |  26<H>  ----------------------------<  95  3.9   |  23  |  0.90    Ca    8.5      25 Oct 2022 07:37  Phos  4.1     10-25  Mg     2.1     10-25                       ACS DAILY PROGRESS NOTE:       SUBJECTIVE/ROS: No acute events overnight.  Patient feels well  Denies nausea, vomiting, chest pain, shortness of breath. Patient tolerating minimal diet 2/2 gurgling.       MEDICATIONS  (STANDING):  acetaminophen   IVPB .. 750 milliGRAM(s) IV Intermittent every 6 hours  artificial  tears Solution 1 Drop(s) Both EYES two times a day  busPIRone 7.5 milliGRAM(s) Oral every 12 hours  citalopram 20 milliGRAM(s) Oral daily  dextrose 5% + sodium chloride 0.45%. 1000 milliLiter(s) (85 mL/Hr) IV Continuous <Continuous>  heparin   Injectable 5000 Unit(s) SubCutaneous every 12 hours  lidocaine   4% Patch 1 Patch Transdermal every 24 hours  losartan 50 milliGRAM(s) Oral daily  metoprolol succinate ER 25 milliGRAM(s) Oral daily  NIFEdipine XL 60 milliGRAM(s) Oral daily  piperacillin/tazobactam IVPB.. 3.375 Gram(s) IV Intermittent every 8 hours  polyethylene glycol 3350 17 Gram(s) Oral daily  senna 2 Tablet(s) Oral at bedtime  traZODone 100 milliGRAM(s) Oral at bedtime    MEDICATIONS  (PRN):  acetaminophen     Tablet .. 650 milliGRAM(s) Oral every 6 hours PRN Mild Pain (1 - 3)  oxyCODONE    IR 2.5 milliGRAM(s) Oral every 6 hours PRN Moderate Pain (4 - 6)      OBJECTIVE:    Vital Signs Last 24 Hrs  T(C): 37.4 (26 Oct 2022 04:53), Max: 37.9 (25 Oct 2022 17:19)  T(F): 99.4 (26 Oct 2022 04:53), Max: 100.2 (25 Oct 2022 17:19)  HR: 75 (26 Oct 2022 04:53) (60 - 78)  BP: 164/77 (26 Oct 2022 04:53) (121/55 - 176/69)  BP(mean): --  RR: 18 (26 Oct 2022 04:53) (18 - 18)  SpO2: 93% (26 Oct 2022 04:53) (93% - 97%)    Parameters below as of 26 Oct 2022 04:53  Patient On (Oxygen Delivery Method): room air      Parameters below as of 26 Oct 2022 00:06  Patient On (Oxygen Delivery Method): room air    PHYSICAL EXAM:  Constitutional: NAD  Respiratory: Unlabored breathing  Abdomen: Soft, nondistended, NTTP. No rebound or guarding.  Extremities: WWP, SONG spontaneously        I&O's Detail    24 Oct 2022 07:01  -  25 Oct 2022 07:00  --------------------------------------------------------  IN:    IV PiggyBack: 450 mL    Lactated Ringers: 1020 mL    Oral Fluid: 200 mL  Total IN: 1670 mL    OUT:    Voided (mL): 300 mL  Total OUT: 300 mL    Total NET: 1370 mL      25 Oct 2022 07:01  -  26 Oct 2022 02:42  --------------------------------------------------------  IN:    dextrose 5% + sodium chloride 0.45%: 298 mL    IV PiggyBack: 200 mL    Lactated Ringers: 723 mL  Total IN: 1221 mL    OUT:    Oral Fluid: 0 mL    Voided (mL): 600 mL  Total OUT: 600 mL    Total NET: 621 mL          Daily     Daily     LABS:                        9.3    14.59 )-----------( 314      ( 25 Oct 2022 07:38 )             29.0     10-25    147<H>  |  108  |  26<H>  ----------------------------<  95  3.9   |  23  |  0.90    Ca    8.5      25 Oct 2022 07:37  Phos  4.1     10-25  Mg     2.1     10-25

## 2022-10-26 NOTE — SWALLOW BEDSIDE ASSESSMENT ADULT - NS SPL SWALLOW CLINIC TRIAL FT
As trials progressed notable in crease in multiple swallows per single, small trials subsequently resulting in change in vocal quality, throat clearing and cough response (overt s/sx of aspiration) which cleared wet vocal quality, however upon continued trials (RN providing medication/crushed) pt presented similarly therefore po trials were terminated in presence of overt s/sx of aspiration. Current swallow profile demonstrating deficits as characterized above. Of note, pt denied globus sensation as well as difficulty; appearing calm and indicating that she enjoys eating/drinking.

## 2022-10-26 NOTE — PROGRESS NOTE ADULT - ASSESSMENT
89 yo female with a PMHx of HTN, presents from [facility] where she was lethargic and weak since last night and hypoxic since this morning. Found to have acute displaced fractures of the left fourth through sixth ribs.    Plan:  - Multimodal pain control for rib fractues  -zosyn for PNA on xray   - discontinued haldol/clonazepam/other sedating medications in effort to reduce drowsiness  -metoprolol 25xl qd  nifedipine restarted  -appreciate Medicine recs   - DVT ppx  - puree diet  - OOB  - IS  - TEENA      ACS  [id number]     89 yo female with a PMHx of HTN, presents from [facility] where she was lethargic and weak since last night and hypoxic since this morning. Found to have acute displaced fractures of the left fourth through sixth ribs.    Plan:  - GOC to be discussed with family today, pt DNR/DNI, assess feeding status  - Multimodal pain control for rib fractues  - zosyn for PNA on xray   - discontinued haldol/clonazepam/other sedating medications in effort to reduce drowsiness  -metoprolol 25xl qd  nifedipine restarted  -appreciate Medicine recs   - DVT ppx  - puree diet  - OOB  - IS  - TEENA  - AM labs    ACS  [id number]

## 2022-10-26 NOTE — SWALLOW BEDSIDE ASSESSMENT ADULT - SWALLOW EVAL: RECOMMENDED DIET
Recommend Patient/caregiver/physician discussion regarding goals of care as they relate to nutrition/hydration. Consider (a) NPO with long-term enteral feeding route, which does not eliminate risk for aspiration PNA & will likely negatively impact QOL; versus (b) PO comfort feeding despite high risk for PNA/malnutrition/dehydration.

## 2022-10-26 NOTE — PROGRESS NOTE ADULT - SUBJECTIVE AND OBJECTIVE BOX
Chuck Narayanan MD  Division of Hospital Medicine  Available on MS teams until 7pm  If no response or off-hours, page [phone]  -------------------------------------    Patient is a 89y old  Female who presents with a chief complaint of fall (26 Oct 2022 02:41)      SUBJECTIVE / OVERNIGHT EVENTS: none acute  ADDITIONAL REVIEW OF SYSTEMS: appears much more awake/alert today- speaking, denies aches/pains, no fevers/chills. Pleasant. ros otherwise negative.     MEDICATIONS  (STANDING):  acetaminophen   IVPB .. 750 milliGRAM(s) IV Intermittent every 6 hours  artificial  tears Solution 1 Drop(s) Both EYES two times a day  busPIRone 7.5 milliGRAM(s) Oral every 12 hours  citalopram 20 milliGRAM(s) Oral daily  dextrose 5% + sodium chloride 0.45%. 1000 milliLiter(s) (85 mL/Hr) IV Continuous <Continuous>  heparin   Injectable 5000 Unit(s) SubCutaneous every 12 hours  lidocaine   4% Patch 1 Patch Transdermal every 24 hours  losartan 50 milliGRAM(s) Oral daily  metoprolol succinate ER 25 milliGRAM(s) Oral daily  NIFEdipine XL 60 milliGRAM(s) Oral daily  piperacillin/tazobactam IVPB.. 3.375 Gram(s) IV Intermittent every 8 hours  polyethylene glycol 3350 17 Gram(s) Oral daily  potassium chloride  10 mEq/100 mL IVPB 10 milliEquivalent(s) IV Intermittent every 1 hour  senna 2 Tablet(s) Oral at bedtime  traZODone 100 milliGRAM(s) Oral at bedtime    MEDICATIONS  (PRN):  acetaminophen     Tablet .. 650 milliGRAM(s) Oral every 6 hours PRN Mild Pain (1 - 3)  oxyCODONE    IR 2.5 milliGRAM(s) Oral every 6 hours PRN Moderate Pain (4 - 6)      CAPILLARY BLOOD GLUCOSE        I&O's Summary    25 Oct 2022 07:01  -  26 Oct 2022 07:00  --------------------------------------------------------  IN: 2241 mL / OUT: 900 mL / NET: 1341 mL    26 Oct 2022 07:01  -  26 Oct 2022 14:31  --------------------------------------------------------  IN: 600 mL / OUT: 200 mL / NET: 400 mL        PHYSICAL EXAM:  Vital Signs Last 24 Hrs  T(C): 36.2 (26 Oct 2022 12:51), Max: 37.9 (25 Oct 2022 17:19)  T(F): 97.1 (26 Oct 2022 12:51), Max: 100.2 (25 Oct 2022 17:19)  HR: 86 (26 Oct 2022 12:51) (70 - 86)  BP: 141/69 (26 Oct 2022 12:51) (121/55 - 164/77)  BP(mean): --  RR: 18 (26 Oct 2022 12:51) (18 - 18)  SpO2: 95% (26 Oct 2022 12:51) (92% - 95%)    Parameters below as of 26 Oct 2022 12:51  Patient On (Oxygen Delivery Method): room air      CONSTITUTIONAL: NAD  EYES: PERRLA; conjunctiva and sclera clear  ENMT: MMM  NECK: Supple  RESPIRATORY: Normal respiratory effort; CTAB  CARDIOVASCULAR: RRR, no JVD, no peripheral edema   ABDOMEN: Nontender to palpation, normoactive BS, no guarding/rigidity  MUSCLOSKELETAL:  no clubbing/cyanosis, no joint swelling or tenderness to palpation  PSYCH: A+O x 1-2, affect normal  NEUROLOGY: CN 2-12 are intact and symmetric; no gross sensory or motor deficits  SKIN: No rashes; no palpable lesions    LABS:                        9.5    14.11 )-----------( 319      ( 26 Oct 2022 06:27 )             29.3     10-26    140  |  104  |  16  ----------------------------<  124<H>  3.0<L>   |  23  |  0.72    Ca    8.4      26 Oct 2022 06:27  Phos  2.1     10-26  Mg     1.9     10-26                  RADIOLOGY & ADDITIONAL TESTS:  Results Reviewed:   Imaging Personally Reviewed:  Electrocardiogram Personally Reviewed:    COORDINATION OF CARE:  Care Discussed with Consultants/Other Providers [Y/N]: trauma  Prior or Outpatient Records Reviewed [Y/N]:

## 2022-10-26 NOTE — SWALLOW BEDSIDE ASSESSMENT ADULT - PHARYNGEAL PHASE
suspected latency in the swallow response, concern for pharyngeal residue, as trials progressed notable change in vocal quality to 'wet' w/ subsequent cough response, which cleared wet vocal quality;/Delayed pharyngeal swallow/Decreased laryngeal elevation/Wet vocal quality post oral intake/Cough post oral intake/Delayed throat clear post oral intake/Multiple swallows

## 2022-10-26 NOTE — SWALLOW BEDSIDE ASSESSMENT ADULT - ORAL PHASE
no anterior loss, prolonged a-p transport however eventually effective, no pooling or drooling , suspected reduced control of bolus

## 2022-10-26 NOTE — SWALLOW BEDSIDE ASSESSMENT ADULT - SWALLOW EVAL: DIAGNOSIS
Given severe impairments in swallow safety and efficiency as observed bedside, recommend NPO with alternate means of nutrition vs. pleasure feeds and conversation with family to establish goals of care; Pt p/w Severe oropharyngeal dysphagia chronic and progressive in nature in the setting of advanced progressive disease process impacting safety efficiency of swallow function. Consider discussion with the patient and family re: goals of care to determine appropriate least restrictive diet for comfort care versus supplemental assistance from alternative means of nutrition. Due to advanced age and progressive disease process, pt demonstrates Poor prognostic indicators for peg placement.

## 2022-10-26 NOTE — SWALLOW BEDSIDE ASSESSMENT ADULT - COMMENTS
Continued history:     Swallow history: Pt is known to this service from 2018; Recommendations for NPO with MBS if aligned with GOC-please see evaluation for more details. Currently, pt is on moderately thick liquids and puree textures w/ note of "gurgling" as per nursing staff.

## 2022-10-26 NOTE — SWALLOW BEDSIDE ASSESSMENT ADULT - SLP GENERAL OBSERVATIONS
Encountered in bed, awake/alert, communicating verbally; able to follow simple one step directives; [employer]. Accepting of all po; denied difficulty.

## 2022-10-27 LAB
ANION GAP SERPL CALC-SCNC: 13 MMOL/L — SIGNIFICANT CHANGE UP (ref 5–17)
BLD GP AB SCN SERPL QL: NEGATIVE — SIGNIFICANT CHANGE UP
BUN SERPL-MCNC: 14 MG/DL — SIGNIFICANT CHANGE UP (ref 7–23)
CALCIUM SERPL-MCNC: 8 MG/DL — LOW (ref 8.4–10.5)
CHLORIDE SERPL-SCNC: 102 MMOL/L — SIGNIFICANT CHANGE UP (ref 96–108)
CO2 SERPL-SCNC: 21 MMOL/L — LOW (ref 22–31)
CREAT SERPL-MCNC: 0.79 MG/DL — SIGNIFICANT CHANGE UP (ref 0.5–1.3)
EGFR: 67 ML/MIN/1.73M2 — SIGNIFICANT CHANGE UP
GLUCOSE SERPL-MCNC: 146 MG/DL — HIGH (ref 70–99)
HCT VFR BLD CALC: 25.4 % — LOW (ref 34.5–45)
HCT VFR BLD CALC: 26.9 % — LOW (ref 34.5–45)
HCT VFR BLD CALC: 28.9 % — LOW (ref 34.5–45)
HGB BLD-MCNC: 8.3 G/DL — LOW (ref 11.5–15.5)
HGB BLD-MCNC: 8.3 G/DL — LOW (ref 11.5–15.5)
HGB BLD-MCNC: 9.2 G/DL — LOW (ref 11.5–15.5)
MAGNESIUM SERPL-MCNC: 1.8 MG/DL — SIGNIFICANT CHANGE UP (ref 1.6–2.6)
MCHC RBC-ENTMCNC: 28.2 PG — SIGNIFICANT CHANGE UP (ref 27–34)
MCHC RBC-ENTMCNC: 28.8 PG — SIGNIFICANT CHANGE UP (ref 27–34)
MCHC RBC-ENTMCNC: 29 PG — SIGNIFICANT CHANGE UP (ref 27–34)
MCHC RBC-ENTMCNC: 30.9 GM/DL — LOW (ref 32–36)
MCHC RBC-ENTMCNC: 31.8 GM/DL — LOW (ref 32–36)
MCHC RBC-ENTMCNC: 32.7 GM/DL — SIGNIFICANT CHANGE UP (ref 32–36)
MCV RBC AUTO: 88.8 FL — SIGNIFICANT CHANGE UP (ref 80–100)
MCV RBC AUTO: 90.6 FL — SIGNIFICANT CHANGE UP (ref 80–100)
MCV RBC AUTO: 91.5 FL — SIGNIFICANT CHANGE UP (ref 80–100)
NRBC # BLD: 0 /100 WBCS — SIGNIFICANT CHANGE UP (ref 0–0)
PHOSPHATE SERPL-MCNC: 3.8 MG/DL — SIGNIFICANT CHANGE UP (ref 2.5–4.5)
PLATELET # BLD AUTO: 318 K/UL — SIGNIFICANT CHANGE UP (ref 150–400)
PLATELET # BLD AUTO: 322 K/UL — SIGNIFICANT CHANGE UP (ref 150–400)
PLATELET # BLD AUTO: 348 K/UL — SIGNIFICANT CHANGE UP (ref 150–400)
POTASSIUM SERPL-MCNC: 3.4 MMOL/L — LOW (ref 3.5–5.3)
POTASSIUM SERPL-SCNC: 3.4 MMOL/L — LOW (ref 3.5–5.3)
RBC # BLD: 2.86 M/UL — LOW (ref 3.8–5.2)
RBC # BLD: 2.94 M/UL — LOW (ref 3.8–5.2)
RBC # BLD: 3.19 M/UL — LOW (ref 3.8–5.2)
RBC # FLD: 14.6 % — HIGH (ref 10.3–14.5)
RBC # FLD: 14.7 % — HIGH (ref 10.3–14.5)
RBC # FLD: 14.8 % — HIGH (ref 10.3–14.5)
RH IG SCN BLD-IMP: NEGATIVE — SIGNIFICANT CHANGE UP
SODIUM SERPL-SCNC: 136 MMOL/L — SIGNIFICANT CHANGE UP (ref 135–145)
WBC # BLD: 12.54 K/UL — HIGH (ref 3.8–10.5)
WBC # BLD: 16.28 K/UL — HIGH (ref 3.8–10.5)
WBC # BLD: 9.03 K/UL — SIGNIFICANT CHANGE UP (ref 3.8–10.5)
WBC # FLD AUTO: 12.54 K/UL — HIGH (ref 3.8–10.5)
WBC # FLD AUTO: 16.28 K/UL — HIGH (ref 3.8–10.5)
WBC # FLD AUTO: 9.03 K/UL — SIGNIFICANT CHANGE UP (ref 3.8–10.5)

## 2022-10-27 PROCEDURE — 99222 1ST HOSP IP/OBS MODERATE 55: CPT | Mod: GC

## 2022-10-27 PROCEDURE — 99233 SBSQ HOSP IP/OBS HIGH 50: CPT

## 2022-10-27 RX ORDER — POTASSIUM CHLORIDE 20 MEQ
10 PACKET (EA) ORAL
Refills: 0 | Status: COMPLETED | OUTPATIENT
Start: 2022-10-27 | End: 2022-10-27

## 2022-10-27 RX ORDER — METOPROLOL TARTRATE 50 MG
25 TABLET ORAL DAILY
Refills: 0 | Status: DISCONTINUED | OUTPATIENT
Start: 2022-10-27 | End: 2022-10-27

## 2022-10-27 RX ORDER — SODIUM CHLORIDE 9 MG/ML
1000 INJECTION, SOLUTION INTRAVENOUS
Refills: 0 | Status: DISCONTINUED | OUTPATIENT
Start: 2022-10-27 | End: 2022-10-28

## 2022-10-27 RX ADMIN — Medication 100 MILLIEQUIVALENT(S): at 12:38

## 2022-10-27 RX ADMIN — Medication 25 MILLIGRAM(S): at 05:24

## 2022-10-27 RX ADMIN — PIPERACILLIN AND TAZOBACTAM 25 GRAM(S): 4; .5 INJECTION, POWDER, LYOPHILIZED, FOR SOLUTION INTRAVENOUS at 05:24

## 2022-10-27 RX ADMIN — Medication 1 DROP(S): at 05:24

## 2022-10-27 RX ADMIN — LIDOCAINE 1 PATCH: 4 CREAM TOPICAL at 23:55

## 2022-10-27 RX ADMIN — PIPERACILLIN AND TAZOBACTAM 25 GRAM(S): 4; .5 INJECTION, POWDER, LYOPHILIZED, FOR SOLUTION INTRAVENOUS at 22:43

## 2022-10-27 RX ADMIN — SODIUM CHLORIDE 100 MILLILITER(S): 9 INJECTION, SOLUTION INTRAVENOUS at 13:30

## 2022-10-27 RX ADMIN — Medication 100 MILLIEQUIVALENT(S): at 06:49

## 2022-10-27 RX ADMIN — LIDOCAINE 1 PATCH: 4 CREAM TOPICAL at 00:06

## 2022-10-27 RX ADMIN — LOSARTAN POTASSIUM 50 MILLIGRAM(S): 100 TABLET, FILM COATED ORAL at 05:24

## 2022-10-27 RX ADMIN — LIDOCAINE 1 PATCH: 4 CREAM TOPICAL at 12:55

## 2022-10-27 RX ADMIN — Medication 1 DROP(S): at 18:49

## 2022-10-27 RX ADMIN — Medication 100 MILLIEQUIVALENT(S): at 09:42

## 2022-10-27 RX ADMIN — SODIUM CHLORIDE 100 MILLILITER(S): 9 INJECTION, SOLUTION INTRAVENOUS at 17:12

## 2022-10-27 RX ADMIN — Medication 60 MILLIGRAM(S): at 05:24

## 2022-10-27 RX ADMIN — LIDOCAINE 1 PATCH: 4 CREAM TOPICAL at 07:45

## 2022-10-27 NOTE — PROGRESS NOTE ADULT - SUBJECTIVE AND OBJECTIVE BOX
Chuck Narayanan MD  Division of Hospital Medicine  Available on MS teams until 7pm  If no response or off-hours, page [phone]  -------------------------------------    Patient is a 89y old  Female who presents with a chief complaint of weakness (26 Oct 2022 14:23)    SUBJECTIVE / OVERNIGHT EVENTS: multiple bouts of brbpr overnight  ADDITIONAL REVIEW OF SYSTEMS: today, patient very lethargic, minimally arousable to touch/verbal stimuli. Unable to asess ROS.     MEDICATIONS  (STANDING):  acetaminophen   IVPB .. 750 milliGRAM(s) IV Intermittent every 6 hours  artificial  tears Solution 1 Drop(s) Both EYES two times a day  busPIRone 7.5 milliGRAM(s) Oral every 12 hours  citalopram 20 milliGRAM(s) Oral daily  dextrose 5% + sodium chloride 0.45%. 1000 milliLiter(s) (100 mL/Hr) IV Continuous <Continuous>  lidocaine   4% Patch 1 Patch Transdermal every 24 hours  metoprolol succinate ER 25 milliGRAM(s) Oral daily  NIFEdipine XL 60 milliGRAM(s) Oral daily  piperacillin/tazobactam IVPB.. 3.375 Gram(s) IV Intermittent every 8 hours  polyethylene glycol 3350 17 Gram(s) Oral daily  senna 2 Tablet(s) Oral at bedtime  sorbitol 70%/mineral oil/magnesium hydroxide/glycerin Enema 120 milliLiter(s) Rectal two times a day  traZODone 100 milliGRAM(s) Oral at bedtime    MEDICATIONS  (PRN):  acetaminophen     Tablet .. 650 milliGRAM(s) Oral every 6 hours PRN Mild Pain (1 - 3)  oxyCODONE    IR 2.5 milliGRAM(s) Oral every 6 hours PRN Moderate Pain (4 - 6)      CAPILLARY BLOOD GLUCOSE        I&O's Summary    26 Oct 2022 07:01  -  27 Oct 2022 07:00  --------------------------------------------------------  IN: 2920 mL / OUT: 1050 mL / NET: 1870 mL    27 Oct 2022 07:01  -  27 Oct 2022 13:40  --------------------------------------------------------  IN: 0 mL / OUT: 0 mL / NET: 0 mL        PHYSICAL EXAM:  Vital Signs Last 24 Hrs  T(C): 36.4 (27 Oct 2022 09:01), Max: 37.7 (26 Oct 2022 21:18)  T(F): 97.5 (27 Oct 2022 09:01), Max: 99.8 (26 Oct 2022 21:18)  HR: 70 (27 Oct 2022 09:01) (65 - 87)  BP: 110/56 (27 Oct 2022 09:01) (110/56 - 191/74)  BP(mean): --  RR: 18 (27 Oct 2022 09:01) (18 - 18)  SpO2: 93% (27 Oct 2022 09:01) (93% - 94%)    Parameters below as of 27 Oct 2022 09:01  Patient On (Oxygen Delivery Method): room air      CONSTITUTIONAL: lethargic, minimally arousable  EYES: PERRLA; conjunctiva and sclera clear  ENMT: MMM  NECK: Supple  RESPIRATORY: Normal respiratory effort; CTAB  CARDIOVASCULAR: RRR, no JVD, no peripheral edema   ABDOMEN: Nontender to palpation, normoactive BS, no guarding/rigidity  MUSCLOSKELETAL:  no clubbing/cyanosis, no joint swelling or tenderness to palpation  PSYCH: unable to assess  NEUROLOGY: CN 2-12 are intact and symmetric; no gross sensory or motor deficits  SKIN: No rashes; no palpable lesions    LABS:                        8.3    9.03  )-----------( 318      ( 27 Oct 2022 12:59 )             26.9     10-27    136  |  102  |  14  ----------------------------<  146<H>  3.4<L>   |  21<L>  |  0.79    Ca    8.0<L>      27 Oct 2022 07:19  Phos  3.8     10-27  Mg     1.8     10-27                  RADIOLOGY & ADDITIONAL TESTS:  Results Reviewed:   Imaging Personally Reviewed:  Electrocardiogram Personally Reviewed:    COORDINATION OF CARE:  Care Discussed with Consultants/Other Providers [Y/N]: trauma team  Prior or Outpatient Records Reviewed [Y/N]:

## 2022-10-27 NOTE — PROGRESS NOTE ADULT - SUBJECTIVE AND OBJECTIVE BOX
TRAUMA SURGERY DAILY PROGRESS NOTE:     SUBJECTIVE/ROS:     Overnight: Patient developed BRBPR x1. Patient is HDS. Stat Labs obtained overnight are without significant derangement-Hgb stable. Mental status is at baseline.    Patient seen and evaluated on AM rounds. ***.   Patient otherwise denies nausea, vomiting, chest pain, shortness of breath     OBJECTIVE:  Vital Signs Last 24 Hrs  T(C): 36.5 (27 Oct 2022 05:18), Max: 37.7 (26 Oct 2022 21:18)  T(F): 97.7 (27 Oct 2022 05:18), Max: 99.8 (26 Oct 2022 21:18)  HR: 65 (27 Oct 2022 05:18) (65 - 87)  BP: 159/71 (27 Oct 2022 05:18) (122/71 - 191/74)  BP(mean): --  RR: 18 (27 Oct 2022 05:18) (18 - 18)  SpO2: 94% (27 Oct 2022 05:18) (92% - 95%)    Parameters below as of 27 Oct 2022 05:18  Patient On (Oxygen Delivery Method): room air      I&O's Detail    25 Oct 2022 07:01  -  26 Oct 2022 07:00  --------------------------------------------------------  IN:    dextrose 5% + sodium chloride 0.45%: 1318 mL    IV PiggyBack: 200 mL    Lactated Ringers: 723 mL  Total IN: 2241 mL    OUT:    Oral Fluid: 0 mL    Voided (mL): 900 mL  Total OUT: 900 mL    Total NET: 1341 mL      26 Oct 2022 07:01  -  27 Oct 2022 05:41  --------------------------------------------------------  IN:    dextrose 5% + sodium chloride 0.45%: 1520 mL    IV PiggyBack: 500 mL    IV PiggyBack: 300 mL    IV PiggyBack: 300 mL    Oral Fluid: 200 mL  Total IN: 2820 mL    OUT:    Voided (mL): 1050 mL  Total OUT: 1050 mL    Total NET: 1770 mL        Daily     Daily   MEDICATIONS  (STANDING):  acetaminophen   IVPB .. 750 milliGRAM(s) IV Intermittent every 6 hours  artificial  tears Solution 1 Drop(s) Both EYES two times a day  busPIRone 7.5 milliGRAM(s) Oral every 12 hours  citalopram 20 milliGRAM(s) Oral daily  dextrose 5% + sodium chloride 0.45%. 1000 milliLiter(s) (85 mL/Hr) IV Continuous <Continuous>  lidocaine   4% Patch 1 Patch Transdermal every 24 hours  losartan 50 milliGRAM(s) Oral daily  metoprolol succinate ER 25 milliGRAM(s) Oral daily  NIFEdipine XL 60 milliGRAM(s) Oral daily  piperacillin/tazobactam IVPB.. 3.375 Gram(s) IV Intermittent every 8 hours  polyethylene glycol 3350 17 Gram(s) Oral daily  senna 2 Tablet(s) Oral at bedtime  traZODone 100 milliGRAM(s) Oral at bedtime    MEDICATIONS  (PRN):  acetaminophen     Tablet .. 650 milliGRAM(s) Oral every 6 hours PRN Mild Pain (1 - 3)  oxyCODONE    IR 2.5 milliGRAM(s) Oral every 6 hours PRN Moderate Pain (4 - 6)      Labs:                        9.2    16.28 )-----------( 348      ( 27 Oct 2022 00:31 )             28.9     10-26    138  |  103  |  12  ----------------------------<  154<H>  3.5   |  21<L>  |  0.66    Ca    8.0<L>      26 Oct 2022 21:42  Phos  3.3     10-26  Mg     1.9     10-26          Physical Exam:  Constitutional: NAD  Respiratory: Unlabored breathing  Abdomen: Soft, nondistended, NTTP. No rebound or guarding. IZA with minimal bright blood in rectal vault. No hemorrhoids, masses palpable on IZA.   Extremities: WWP, SONG spontaneously         TRAUMA SURGERY DAILY PROGRESS NOTE:     SUBJECTIVE/ROS:     Overnight: Patient hypertensive, systolics in 170s, given hydralazine 5mg. Patient also developed BRBPR x1. Patient is HDS. Stat Labs obtained overnight are without significant derangement-Hgb stable. Mental status is at baseline.    Patient seen and evaluated on AM rounds. ***.   Patient otherwise denies nausea, vomiting, chest pain, shortness of breath     OBJECTIVE:  Vital Signs Last 24 Hrs  T(C): 36.5 (27 Oct 2022 05:18), Max: 37.7 (26 Oct 2022 21:18)  T(F): 97.7 (27 Oct 2022 05:18), Max: 99.8 (26 Oct 2022 21:18)  HR: 65 (27 Oct 2022 05:18) (65 - 87)  BP: 159/71 (27 Oct 2022 05:18) (122/71 - 191/74)  BP(mean): --  RR: 18 (27 Oct 2022 05:18) (18 - 18)  SpO2: 94% (27 Oct 2022 05:18) (92% - 95%)    Parameters below as of 27 Oct 2022 05:18  Patient On (Oxygen Delivery Method): room air      I&O's Detail    25 Oct 2022 07:01  -  26 Oct 2022 07:00  --------------------------------------------------------  IN:    dextrose 5% + sodium chloride 0.45%: 1318 mL    IV PiggyBack: 200 mL    Lactated Ringers: 723 mL  Total IN: 2241 mL    OUT:    Oral Fluid: 0 mL    Voided (mL): 900 mL  Total OUT: 900 mL    Total NET: 1341 mL      26 Oct 2022 07:01  -  27 Oct 2022 05:41  --------------------------------------------------------  IN:    dextrose 5% + sodium chloride 0.45%: 1520 mL    IV PiggyBack: 500 mL    IV PiggyBack: 300 mL    IV PiggyBack: 300 mL    Oral Fluid: 200 mL  Total IN: 2820 mL    OUT:    Voided (mL): 1050 mL  Total OUT: 1050 mL    Total NET: 1770 mL        Daily     Daily   MEDICATIONS  (STANDING):  acetaminophen   IVPB .. 750 milliGRAM(s) IV Intermittent every 6 hours  artificial  tears Solution 1 Drop(s) Both EYES two times a day  busPIRone 7.5 milliGRAM(s) Oral every 12 hours  citalopram 20 milliGRAM(s) Oral daily  dextrose 5% + sodium chloride 0.45%. 1000 milliLiter(s) (85 mL/Hr) IV Continuous <Continuous>  lidocaine   4% Patch 1 Patch Transdermal every 24 hours  losartan 50 milliGRAM(s) Oral daily  metoprolol succinate ER 25 milliGRAM(s) Oral daily  NIFEdipine XL 60 milliGRAM(s) Oral daily  piperacillin/tazobactam IVPB.. 3.375 Gram(s) IV Intermittent every 8 hours  polyethylene glycol 3350 17 Gram(s) Oral daily  senna 2 Tablet(s) Oral at bedtime  traZODone 100 milliGRAM(s) Oral at bedtime    MEDICATIONS  (PRN):  acetaminophen     Tablet .. 650 milliGRAM(s) Oral every 6 hours PRN Mild Pain (1 - 3)  oxyCODONE    IR 2.5 milliGRAM(s) Oral every 6 hours PRN Moderate Pain (4 - 6)      Labs:                        9.2    16.28 )-----------( 348      ( 27 Oct 2022 00:31 )             28.9     10-26    138  |  103  |  12  ----------------------------<  154<H>  3.5   |  21<L>  |  0.66    Ca    8.0<L>      26 Oct 2022 21:42  Phos  3.3     10-26  Mg     1.9     10-26          Physical Exam:  Constitutional: NAD  Respiratory: Unlabored breathing  Abdomen: Soft, nondistended, NTTP. No rebound or guarding. IZA with minimal bright blood in rectal vault. No hemorrhoids, masses palpable on IZA.   Extremities: WWP, SONG spontaneously         TRAUMA SURGERY DAILY PROGRESS NOTE:     SUBJECTIVE/ROS:     Overnight: Patient hypertensive, systolics in 170s, given hydralazine 5mg. Patient also developed BRBPR x2. Patient is HDS. Stat Labs obtained overnight are without significant derangement-Hgb stable. Mental status is at baseline.    Patient seen and evaluated on AM rounds. Patient resting comfortably.       OBJECTIVE:  Vital Signs Last 24 Hrs  T(C): 36.5 (27 Oct 2022 05:18), Max: 37.7 (26 Oct 2022 21:18)  T(F): 97.7 (27 Oct 2022 05:18), Max: 99.8 (26 Oct 2022 21:18)  HR: 65 (27 Oct 2022 05:18) (65 - 87)  BP: 159/71 (27 Oct 2022 05:18) (122/71 - 191/74)  BP(mean): --  RR: 18 (27 Oct 2022 05:18) (18 - 18)  SpO2: 94% (27 Oct 2022 05:18) (92% - 95%)    Parameters below as of 27 Oct 2022 05:18  Patient On (Oxygen Delivery Method): room air      I&O's Detail    25 Oct 2022 07:01  -  26 Oct 2022 07:00  --------------------------------------------------------  IN:    dextrose 5% + sodium chloride 0.45%: 1318 mL    IV PiggyBack: 200 mL    Lactated Ringers: 723 mL  Total IN: 2241 mL    OUT:    Oral Fluid: 0 mL    Voided (mL): 900 mL  Total OUT: 900 mL    Total NET: 1341 mL      26 Oct 2022 07:01  -  27 Oct 2022 05:41  --------------------------------------------------------  IN:    dextrose 5% + sodium chloride 0.45%: 1520 mL    IV PiggyBack: 500 mL    IV PiggyBack: 300 mL    IV PiggyBack: 300 mL    Oral Fluid: 200 mL  Total IN: 2820 mL    OUT:    Voided (mL): 1050 mL  Total OUT: 1050 mL    Total NET: 1770 mL        Daily     Daily   MEDICATIONS  (STANDING):  acetaminophen   IVPB .. 750 milliGRAM(s) IV Intermittent every 6 hours  artificial  tears Solution 1 Drop(s) Both EYES two times a day  busPIRone 7.5 milliGRAM(s) Oral every 12 hours  citalopram 20 milliGRAM(s) Oral daily  dextrose 5% + sodium chloride 0.45%. 1000 milliLiter(s) (85 mL/Hr) IV Continuous <Continuous>  lidocaine   4% Patch 1 Patch Transdermal every 24 hours  losartan 50 milliGRAM(s) Oral daily  metoprolol succinate ER 25 milliGRAM(s) Oral daily  NIFEdipine XL 60 milliGRAM(s) Oral daily  piperacillin/tazobactam IVPB.. 3.375 Gram(s) IV Intermittent every 8 hours  polyethylene glycol 3350 17 Gram(s) Oral daily  senna 2 Tablet(s) Oral at bedtime  traZODone 100 milliGRAM(s) Oral at bedtime    MEDICATIONS  (PRN):  acetaminophen     Tablet .. 650 milliGRAM(s) Oral every 6 hours PRN Mild Pain (1 - 3)  oxyCODONE    IR 2.5 milliGRAM(s) Oral every 6 hours PRN Moderate Pain (4 - 6)      Labs:                        9.2    16.28 )-----------( 348      ( 27 Oct 2022 00:31 )             28.9     10-26    138  |  103  |  12  ----------------------------<  154<H>  3.5   |  21<L>  |  0.66    Ca    8.0<L>      26 Oct 2022 21:42  Phos  3.3     10-26  Mg     1.9     10-26          Physical Exam:  Constitutional: NAD  Respiratory: Unlabored breathing  Abdomen: Soft, nondistended, NTTP. No rebound or guarding. IZA with minimal bright blood in rectal vault. No hemorrhoids, masses palpable on IZA.   Extremities: WWP, SONG spontaneously

## 2022-10-27 NOTE — CONSULT NOTE ADULT - ATTENDING COMMENTS
Dr. Sommers (Attending Physician)  Agitated Delirium give haldol, may need precedex gtt  Rib Fractures with hypoxia; however, no rib pain or ecchymoses, unable to perform IS. Will obs in SICU  Lumbar Spine Fracture no lumbar spine pain or evidence of injury, no cauda equina symptoms.  UTI on ceftriaxone, also on azithromycin for possible pna
Agree with above. Pt with large stool burden on CT, now with hematochezia and mild drop in H/H. Family does not want any aggressive interventions or endoscopic evaluation. ?stercoral colitis vs straining resulting in hemorroidal/fissure bleeding/ulcer - Would give enemas to clear rectal stool burden and start standing Miralax.

## 2022-10-27 NOTE — PROGRESS NOTE ADULT - ASSESSMENT
89F hx dementia, anxiety/depression and HTN presenting from [facility] with weakness and encephalopathy, found to have L displaced 4-6 rib fractures suggesting a preceding trauma or fall, unclear if syncopal vs. mechanical, found also to have citrobacter UTI and uptrending white count despite appropriate antibiotics concerning for concomitant PNA.     #Toxic metabolic encephalopathy- likely 2/2 acute blood loss anemia from acute BRBPR overnight. Pt minimally arousable to verbal/tactile stimuli  - discussed status with HCP Alexey- who would like to continue current level of care, forego any GI/endoscopic evaluation or any further escalation of care  - HCP would like [facility] consult for PCU evaluation  - cont monitoring mental status, cont trending Hb and monitoring for further bleed as per surgery  - continue dextrose containing IVF fo rnow    #BRBPR- endoscopy not in line with GOC. Cont trending cbc and transfuse to Hb > 7, keep active type and screen    1. Rib fractures- L sided displaced rib fractures suggest a preceding trauma or fall. Unclear if fall would have been syncopal or mechanical, but in the setting of active infections, syncopal from hypovolemia is possible. It appears fall was unwitnessed and not noticed as patient may have gotten herself up and not notified anyone.   - recommend tele monitoring at least x 24 hours  - s/p ceftriaxone for UTI, would start zosyn for PNA seen on CXR- anticipate 5-7 day course  - check orthostatics when able  - PT-eval- rec TEENA. Family would like to send to [facility] with transition to LTC and eventual hospice    2. HTN- 's. Goal SBP for age would be 130-140's  - cont antihypertensives with stricter hold parameters in setting of active bleed    3. Leukocytosis- uptrended white count and positive Ucx growing citrobacter pansensitive. Now elevated likely 2/2 acute GI bleed  - s/p ceftriaxone x 3 days  - white count continued to uptrend- review of CXR reveals L retrocardiac opacity, possible PNA  - will start zosyn 3.375gm iv q 8 hours for presumed PNA- anticipate treatment (through 10/29)  - cont trending CBC    4. anxiety/depression- cont home buspirone, celexa, trazodone  - cont holding klonopin  - may resume mirtazapine if tolerating the above    5. Dysphagia- poor swallow function, likely aspiration PNA. Per s/s patient is not a candidate for oral feeds.   - discussed with HCP Nephew Alexey- pt to continue pleasure feeds, accepts risk of future aspiration and decompensation. She is not eating at the moment due to mental status.  - GOC as above    dispo: pt prognosis poor. Alexey feels that her status was particularly good yesterday and thinks this may have been 'her last hurrah' before passing. He feels death is imminent and would like to focus her care on comfort.

## 2022-10-27 NOTE — CHART NOTE - NSCHARTNOTEFT_GEN_A_CORE
Ms. Lopez is 89F currently admitted w acute L Rib Fx 4-6. Overnight, patient developed BRBPR x1. Patient is HDS. Stat Labs obtained overnight are without significant derangement-Hgb stable. Mental status is at baseline. Abdominal exam is benign. IZA with minimal bright blood in rectal vault. No hemorrhoids, masses palpable on IZA. Patient DNR/DNI.     Recommend:   -Hold VTE ppx  -SCD  -Trend CBC q12  -Monitor Hemodynamics closely  -Consider GI consult for possible endoscopy if in line with patient and family [facility]     Nir Rodriguez MD PGY2

## 2022-10-27 NOTE — PROGRESS NOTE ADULT - ASSESSMENT
89 yo female with a PMHx of HTN, presents from [facility] where she was lethargic and weak since last night and hypoxic since this morning. Found to have acute displaced fractures of the left fourth through sixth ribs.    Plan:  - Consider GI consult for possible endoscopy if in line with patient and family GOC   - f/u AM CBC   - GOC to be discussed with family today, pt DNR/DNI, assess feeding status  - Multimodal pain control for rib fractues  - zosyn for PNA on xray   - discontinued haldol/clonazepam/other sedating medications in effort to reduce drowsiness  -metoprolol 25xl qd  nifedipine restarted  -appreciate Medicine recs   - Holding DVT ppx i/s/o BRBPR    - SCDs   - puree diet  - OOB  - IS  - TEENA  - AM labs      ACS  P# [id number]

## 2022-10-27 NOTE — CONSULT NOTE ADULT - SUBJECTIVE AND OBJECTIVE BOX
Chief Complaint:  Patient is a 89y old  Female who presents with a chief complaint of weakness (26 Oct 2022 14:23)      HPI:  Ms. Lopez is a 89yo F with PMH of HTN who initially presented with AMS, hypoxia. GI consulted for rectal bleeding.    Limited history from patient.    Patient originally presented 10/19 with AMS, found to have acute displaced fractures of the left fourth through sixth ribs. Treated with antibiotics for UTI. During her hospital course patient was made DNR/DNI and family elected to concentrate on comfort care.     Overnight patient was noted to have 2 episodes of hematochezia, dark-red mixed with hard stool and blood clots. Per team patient with daily BMs prior to event. No abdominal pain. No documented hypotensive episodes. Unclear time of last colonoscopy/endoscopy. No fever, chills.      Allergies:  No Known Allergies      Home Medications:  acetaminophen 325 mg oral tablet: 2 tab(s) orally every 6 hours, As needed, Mild Pain (1 - 3) (25 Oct 2022 08:29)  Ambien 5 mg oral tablet: 1 tab(s) orally once a day (at bedtime), As Needed for insomnia (20 Oct 2022 08:23)  B 100 Complex oral tablet: 1 tab(s) orally once a day (20 Oct 2022 08:23)  busPIRone 7.5 mg oral tablet: 1 tab(s) orally 2 times a day (20 Oct 2022 08:23)  Calcium 600+D Plus Minerals oral tablet, chewable: 1 tab(s) orally once a day (20 Oct 2022 08:23)  citalopram 20 mg oral tablet: 1 tab(s) orally once a day (20 Oct 2022 08:23)  clonazePAM 0.5 mg oral tablet: 1 tab(s) orally 2 times a day (20 Oct 2022 08:23)  gabapentin 300 mg oral capsule: 1 cap(s) orally 2 times a day (20 Oct 2022 08:23)  losartan 50 mg oral tablet: 1 tab(s) orally once a day (20 Oct 2022 08:23)  Metoprolol Succinate ER 25 mg oral tablet, extended release: 1 tab(s) orally once a day (20 Oct 2022 08:23)  mirtazapine 15 mg oral tablet: 1 tab(s) orally once a day (at bedtime) (20 Oct 2022 08:23)  Multiple Vitamins oral tablet: 1 tab(s) orally once a day (20 Oct 2022 08:23)  NIFEdipine 60 mg oral tablet, extended release: 1 tab(s) orally once a day (20 Oct 2022 08:23)  oxybutynin 5 mg/24 hours oral tablet, extended release: 1 tab(s) orally once a day (20 Oct 2022 08:23)  pantoprazole 40 mg oral delayed release tablet: 1 tab(s) orally once a day (20 Oct 2022 08:23)  traZODone 100 mg oral tablet: 1  orally once a day (at bedtime) (20 Oct 2022 08:23)  Vitamin B12 1000 mcg oral tablet: 1 tab(s) orally once a day (20 Oct 2022 08:23)  Vitamin D3 400 intl units oral tablet: 1 tab(s) orally once a day (20 Oct 2022 08:23)    Hospital Medications:  acetaminophen     Tablet .. 650 milliGRAM(s) Oral every 6 hours PRN  acetaminophen   IVPB .. 750 milliGRAM(s) IV Intermittent every 6 hours  artificial  tears Solution 1 Drop(s) Both EYES two times a day  busPIRone 7.5 milliGRAM(s) Oral every 12 hours  citalopram 20 milliGRAM(s) Oral daily  dextrose 5% + sodium chloride 0.45%. 1000 milliLiter(s) IV Continuous <Continuous>  lidocaine   4% Patch 1 Patch Transdermal every 24 hours  losartan 50 milliGRAM(s) Oral daily  metoprolol succinate ER 25 milliGRAM(s) Oral daily  NIFEdipine XL 60 milliGRAM(s) Oral daily  oxyCODONE    IR 2.5 milliGRAM(s) Oral every 6 hours PRN  piperacillin/tazobactam IVPB.. 3.375 Gram(s) IV Intermittent every 8 hours  polyethylene glycol 3350 17 Gram(s) Oral daily  potassium chloride  10 mEq/100 mL IVPB 10 milliEquivalent(s) IV Intermittent every 1 hour  senna 2 Tablet(s) Oral at bedtime  traZODone 100 milliGRAM(s) Oral at bedtime      PMHX/PSHX:  Pacemaker    Anxiety    Depression    Hypertension    GERD (gastroesophageal reflux disease)    Osteopenia    Insomnia    Spastic bladder    Vitamin B12 deficiency    No pertinent past medical history    Constipation    Paroxysmal atrial fibrillation    Anxiety    HTN (hypertension)    GERD (gastroesophageal reflux disease)    S/P hysterectomy    No significant past surgical history    Small bowel volvulus        Family history:  No pertinent family history    No pertinent family history in first degree relatives        Denies family history of colon cancer/polyps, stomach cancer/polyps, pancreatic cancer/masses, liver cancer/disease, ovarian cancer and endometrial cancer.    Social History:   Limited due to lethargy    ROS:   Limited due to lethargy, grossly denies any discomfort    PHYSICAL EXAM:   GENERAL:  No acute distress  HEENT:  Normocephalic/atraumatic, no scleral icterus  CHEST:  No accessory muscle use  HEART:  Regular rate and rhythm  ABDOMEN:  Soft, non-tender, non-distended  RECTAL: Dark red blood  EXTREMITIES: No cyanosis, clubbing, or edema  SKIN:  No rash  NEURO:  Lethargic but arouasble, Oriented x 3, no asterixis    Vital Signs:  Vital Signs Last 24 Hrs  T(C): 36.5 (27 Oct 2022 05:18), Max: 37.7 (26 Oct 2022 21:18)  T(F): 97.7 (27 Oct 2022 05:18), Max: 99.8 (26 Oct 2022 21:18)  HR: 65 (27 Oct 2022 05:18) (65 - 87)  BP: 159/71 (27 Oct 2022 05:18) (122/71 - 191/74)  BP(mean): --  RR: 18 (27 Oct 2022 05:18) (18 - 18)  SpO2: 94% (27 Oct 2022 05:18) (92% - 95%)    Parameters below as of 27 Oct 2022 05:18  Patient On (Oxygen Delivery Method): room air      Daily     Daily     LABS:                        8.3    12.54 )-----------( 322      ( 27 Oct 2022 07:17 )             25.4     Mean Cell Volume: 88.8 fl (10-27-22 @ 07:17)    10-26    138  |  103  |  12  ----------------------------<  154<H>  3.5   |  21<L>  |  0.66    Ca    8.0<L>      26 Oct 2022 21:42  Phos  3.3     10-26  Mg     1.9     10-26                          8.3    12.54 )-----------( 322      ( 27 Oct 2022 07:17 )             25.4                         9.2    16.28 )-----------( 348      ( 27 Oct 2022 00:31 )             28.9                         9.5    14.11 )-----------( 319      ( 26 Oct 2022 06:27 )             29.3                         9.3    14.59 )-----------( 314      ( 25 Oct 2022 07:38 )             29.0       Imaging:           Chief Complaint:  Patient is a 89y old  Female who presents with a chief complaint of weakness (26 Oct 2022 14:23)      HPI:  Ms. Lopez is a 89yo F with PMH of HTN who initially presented with AMS, hypoxia. GI consulted for rectal bleeding.    Limited history from patient.    Patient originally presented 10/19 with AMS, found to have acute displaced fractures of the left fourth through sixth ribs. Treated with antibiotics for UTI. During her hospital course patient was made DNR/DNI and family elected to concentrate on comfort care.     Overnight patient was noted to have 2 episodes of hematochezia, dark-red mixed with hard stool and blood clots. Per team patient with daily BMs prior to event. No abdominal pain. No documented hypotensive episodes. Unclear time of last colonoscopy/endoscopy. No fever, chills.      Allergies:  No Known Allergies      Home Medications:  acetaminophen 325 mg oral tablet: 2 tab(s) orally every 6 hours, As needed, Mild Pain (1 - 3) (25 Oct 2022 08:29)  Ambien 5 mg oral tablet: 1 tab(s) orally once a day (at bedtime), As Needed for insomnia (20 Oct 2022 08:23)  B 100 Complex oral tablet: 1 tab(s) orally once a day (20 Oct 2022 08:23)  busPIRone 7.5 mg oral tablet: 1 tab(s) orally 2 times a day (20 Oct 2022 08:23)  Calcium 600+D Plus Minerals oral tablet, chewable: 1 tab(s) orally once a day (20 Oct 2022 08:23)  citalopram 20 mg oral tablet: 1 tab(s) orally once a day (20 Oct 2022 08:23)  clonazePAM 0.5 mg oral tablet: 1 tab(s) orally 2 times a day (20 Oct 2022 08:23)  gabapentin 300 mg oral capsule: 1 cap(s) orally 2 times a day (20 Oct 2022 08:23)  losartan 50 mg oral tablet: 1 tab(s) orally once a day (20 Oct 2022 08:23)  Metoprolol Succinate ER 25 mg oral tablet, extended release: 1 tab(s) orally once a day (20 Oct 2022 08:23)  mirtazapine 15 mg oral tablet: 1 tab(s) orally once a day (at bedtime) (20 Oct 2022 08:23)  Multiple Vitamins oral tablet: 1 tab(s) orally once a day (20 Oct 2022 08:23)  NIFEdipine 60 mg oral tablet, extended release: 1 tab(s) orally once a day (20 Oct 2022 08:23)  oxybutynin 5 mg/24 hours oral tablet, extended release: 1 tab(s) orally once a day (20 Oct 2022 08:23)  pantoprazole 40 mg oral delayed release tablet: 1 tab(s) orally once a day (20 Oct 2022 08:23)  traZODone 100 mg oral tablet: 1  orally once a day (at bedtime) (20 Oct 2022 08:23)  Vitamin B12 1000 mcg oral tablet: 1 tab(s) orally once a day (20 Oct 2022 08:23)  Vitamin D3 400 intl units oral tablet: 1 tab(s) orally once a day (20 Oct 2022 08:23)    Hospital Medications:  acetaminophen     Tablet .. 650 milliGRAM(s) Oral every 6 hours PRN  acetaminophen   IVPB .. 750 milliGRAM(s) IV Intermittent every 6 hours  artificial  tears Solution 1 Drop(s) Both EYES two times a day  busPIRone 7.5 milliGRAM(s) Oral every 12 hours  citalopram 20 milliGRAM(s) Oral daily  dextrose 5% + sodium chloride 0.45%. 1000 milliLiter(s) IV Continuous <Continuous>  lidocaine   4% Patch 1 Patch Transdermal every 24 hours  losartan 50 milliGRAM(s) Oral daily  metoprolol succinate ER 25 milliGRAM(s) Oral daily  NIFEdipine XL 60 milliGRAM(s) Oral daily  oxyCODONE    IR 2.5 milliGRAM(s) Oral every 6 hours PRN  piperacillin/tazobactam IVPB.. 3.375 Gram(s) IV Intermittent every 8 hours  polyethylene glycol 3350 17 Gram(s) Oral daily  potassium chloride  10 mEq/100 mL IVPB 10 milliEquivalent(s) IV Intermittent every 1 hour  senna 2 Tablet(s) Oral at bedtime  traZODone 100 milliGRAM(s) Oral at bedtime      PMHX/PSHX:  Pacemaker    Anxiety    Depression    Hypertension    GERD (gastroesophageal reflux disease)    Osteopenia    Insomnia    Spastic bladder    Vitamin B12 deficiency    No pertinent past medical history    Constipation    Paroxysmal atrial fibrillation    Anxiety    HTN (hypertension)    GERD (gastroesophageal reflux disease)    S/P hysterectomy    No significant past surgical history    Small bowel volvulus        Family history:  No pertinent family history    No pertinent family history in first degree relatives        Denies family history of colon cancer/polyps, stomach cancer/polyps, pancreatic cancer/masses, liver cancer/disease, ovarian cancer and endometrial cancer.    Social History:   Limited due to lethargy    ROS:   Limited due to lethargy, grossly denies any discomfort    PHYSICAL EXAM:   GENERAL:  No acute distress  HEENT:  Normocephalic/atraumatic, no scleral icterus  CHEST:  No accessory muscle use  HEART:  Regular rate and rhythm  ABDOMEN:  Soft, non-tender, non-distended  RECTAL: Dark red blood; unable to do full rectal due to rib fractures  EXTREMITIES: No cyanosis, clubbing, or edema  SKIN:  No rash  NEURO:  Lethargic but arouasble, no asterixis    Vital Signs:  Vital Signs Last 24 Hrs  T(C): 36.5 (27 Oct 2022 05:18), Max: 37.7 (26 Oct 2022 21:18)  T(F): 97.7 (27 Oct 2022 05:18), Max: 99.8 (26 Oct 2022 21:18)  HR: 65 (27 Oct 2022 05:18) (65 - 87)  BP: 159/71 (27 Oct 2022 05:18) (122/71 - 191/74)  BP(mean): --  RR: 18 (27 Oct 2022 05:18) (18 - 18)  SpO2: 94% (27 Oct 2022 05:18) (92% - 95%)    Parameters below as of 27 Oct 2022 05:18  Patient On (Oxygen Delivery Method): room air      Daily     Daily     LABS:                        8.3    12.54 )-----------( 322      ( 27 Oct 2022 07:17 )             25.4     Mean Cell Volume: 88.8 fl (10-27-22 @ 07:17)    10-26    138  |  103  |  12  ----------------------------<  154<H>  3.5   |  21<L>  |  0.66    Ca    8.0<L>      26 Oct 2022 21:42  Phos  3.3     10-26  Mg     1.9     10-26                          8.3    12.54 )-----------( 322      ( 27 Oct 2022 07:17 )             25.4                         9.2    16.28 )-----------( 348      ( 27 Oct 2022 00:31 )             28.9                         9.5    14.11 )-----------( 319      ( 26 Oct 2022 06:27 )             29.3                         9.3    14.59 )-----------( 314      ( 25 Oct 2022 07:38 )             29.0       Imaging:      < from: CT Abdomen and Pelvis No Cont (10.09.22 @ 23:36) >  IMPRESSION:    No CT findings to explain patient's vaginal bleeding. Post hysterectomy.    Acute appearing left transverse process fractures from L1 through L3.    Moderate to large amount of stool throughout the colon and rectum.    Trace bilateral pleural effusions and bibasilar atelectasis/consolidation.    < end of copied text >

## 2022-10-27 NOTE — CONSULT NOTE ADULT - ASSESSMENT
89yo F with PMH of HTN who initially presented with AMS, hypoxia. GI consulted for rectal bleeding.    # Hematochezia - New onset, hemodynamically stable - suggestive of LGIB. DDx includes stercoral colitis in the setting of significant stool burden as seen on CT vs. other colitis (ischemic/inflammatory/infectious) vs. malignancy vs. AVMs. Low suspicion for brisk UGIB.  89yo F with PMH of HTN who initially presented with AMS, hypoxia. GI consulted for rectal bleeding.    # Hematochezia - New onset, hemodynamically stable - suggestive of LGIB. DDx includes stercoral colitis in the setting of significant stool burden as seen on CT vs. other colitis (ischemic/inflammatory/infectious) vs. malignancy vs. AVMs. Low suspicion for brisk UGIB. Discussed at length with HCP - patient previously expressed her interest in concentrating on comfort care. Based on this, HCP does not wish to pursue a diagnostic or even therapeutic colonoscopy at this time, even in the setting of possible hemodynamic compromise. As such, would concentrate on comfort measures. As a possible etiology is constipation, would empirically treat with bowel prep.  # Rib fractures    Recommendations:  - Trend CBC, CMP  - Transfuse for Hb>7 if within GOC  - Would try SMOG enema x 2  - If OK with oral intake, can try miralax BID  - No plans for colonoscopy at this time    Please note that the recommendations are not final until attested by an attending.    Thank you for involving us in the care of this patient. Please reach out if any further questions.    Carl Escobar, PGY-6  Gastroenterology/Hepatology Fellow    Available on [employer] Teams  After 5PM/Weekends, please contact the on-call GI fellow: [phone]   89yo F with PMH of HTN who initially presented with AMS, hypoxia. GI consulted for rectal bleeding.    # Hematochezia - New onset, hemodynamically stable - suggestive of LGIB. DDx includes stercoral colitis in the setting of significant stool burden as seen on CT vs. other colitis (ischemic/inflammatory/infectious) vs. malignancy vs. AVMs. Low suspicion for brisk UGIB. Discussed at length with HCP - patient previously expressed her interest in concentrating on comfort care. Based on this, HCP does not wish to pursue a diagnostic or even therapeutic colonoscopy at this time, even in the setting of a possible hemodynamic compromise. As such, would concentrate on comfort measures. As a possible etiology is constipation, would empirically treat with laxatives.  # Rib fractures    Recommendations:  - Trend CBC, CMP  - Transfuse for Hb>7 if within GOC  - Would try SMOG enema x 2  - If OK with oral intake, can try miralax BID  - No plans for colonoscopy at this time    Please note that the recommendations are not final until attested by an attending.    Thank you for involving us in the care of this patient. Please reach out if any further questions.    Carl Escobar, PGY-6  Gastroenterology/Hepatology Fellow    Available on [employer] Teams  After 5PM/Weekends, please contact the on-call GI fellow: [phone]   89yo F with PMH of HTN who initially presented with AMS, hypoxia. GI consulted for rectal bleeding.    # Hematochezia - New onset, hemodynamically stable - suggestive of LGIB. DDx includes stercoral colitis in the setting of significant stool burden as seen on CT vs. other colitis (ischemic/inflammatory/infectious) vs. malignancy vs. AVMs. Low suspicion for brisk UGIB. Discussed at length with HCP - patient previously expressed her interest in concentrating on comfort care. Based on this, HCP does not wish to pursue a diagnostic or even therapeutic colonoscopy at this time, even in the setting of a possible hemodynamic compromise. As such, would concentrate on comfort measures. As a possible etiology is constipation, would empirically treat with laxatives.  # Rib fractures    Recommendations:  - Trend CBC, CMP  - Transfuse for Hb>7 if within GOC  - Would try SMOG enema x 2  - If OK with oral intake, can try miralax BID standing  - No plans for colonoscopy at this time    Please note that the recommendations are not final until attested by an attending.    Thank you for involving us in the care of this patient. Please reach out if any further questions.    Carl Escobar, PGY-6  Gastroenterology/Hepatology Fellow    Available on [employer] Teams  After 5PM/Weekends, please contact the on-call GI fellow: [phone]

## 2022-10-28 DIAGNOSIS — G93.40 ENCEPHALOPATHY, UNSPECIFIED: ICD-10-CM

## 2022-10-28 DIAGNOSIS — R53.81 OTHER MALAISE: ICD-10-CM

## 2022-10-28 DIAGNOSIS — J69.0 PNEUMONITIS DUE TO INHALATION OF FOOD AND VOMIT: ICD-10-CM

## 2022-10-28 DIAGNOSIS — N39.0 URINARY TRACT INFECTION, SITE NOT SPECIFIED: ICD-10-CM

## 2022-10-28 DIAGNOSIS — Z71.89 OTHER SPECIFIED COUNSELING: ICD-10-CM

## 2022-10-28 LAB
ANION GAP SERPL CALC-SCNC: 11 MMOL/L — SIGNIFICANT CHANGE UP (ref 5–17)
BUN SERPL-MCNC: 12 MG/DL — SIGNIFICANT CHANGE UP (ref 7–23)
CALCIUM SERPL-MCNC: 8.2 MG/DL — LOW (ref 8.4–10.5)
CHLORIDE SERPL-SCNC: 101 MMOL/L — SIGNIFICANT CHANGE UP (ref 96–108)
CO2 SERPL-SCNC: 21 MMOL/L — LOW (ref 22–31)
CREAT SERPL-MCNC: 0.8 MG/DL — SIGNIFICANT CHANGE UP (ref 0.5–1.3)
EGFR: 66 ML/MIN/1.73M2 — SIGNIFICANT CHANGE UP
GLUCOSE SERPL-MCNC: 144 MG/DL — HIGH (ref 70–99)
HCT VFR BLD CALC: 26.8 % — LOW (ref 34.5–45)
HGB BLD-MCNC: 8.4 G/DL — LOW (ref 11.5–15.5)
MAGNESIUM SERPL-MCNC: 1.7 MG/DL — SIGNIFICANT CHANGE UP (ref 1.6–2.6)
MCHC RBC-ENTMCNC: 28.5 PG — SIGNIFICANT CHANGE UP (ref 27–34)
MCHC RBC-ENTMCNC: 31.3 GM/DL — LOW (ref 32–36)
MCV RBC AUTO: 90.8 FL — SIGNIFICANT CHANGE UP (ref 80–100)
NRBC # BLD: 0 /100 WBCS — SIGNIFICANT CHANGE UP (ref 0–0)
PHOSPHATE SERPL-MCNC: 2.6 MG/DL — SIGNIFICANT CHANGE UP (ref 2.5–4.5)
PLATELET # BLD AUTO: 365 K/UL — SIGNIFICANT CHANGE UP (ref 150–400)
POTASSIUM SERPL-MCNC: 3.4 MMOL/L — LOW (ref 3.5–5.3)
POTASSIUM SERPL-SCNC: 3.4 MMOL/L — LOW (ref 3.5–5.3)
RBC # BLD: 2.95 M/UL — LOW (ref 3.8–5.2)
RBC # FLD: 14.9 % — HIGH (ref 10.3–14.5)
SARS-COV-2 RNA SPEC QL NAA+PROBE: SIGNIFICANT CHANGE UP
SODIUM SERPL-SCNC: 133 MMOL/L — LOW (ref 135–145)
WBC # BLD: 11.35 K/UL — HIGH (ref 3.8–10.5)
WBC # FLD AUTO: 11.35 K/UL — HIGH (ref 3.8–10.5)

## 2022-10-28 PROCEDURE — 99223 1ST HOSP IP/OBS HIGH 75: CPT

## 2022-10-28 PROCEDURE — 71045 X-RAY EXAM CHEST 1 VIEW: CPT | Mod: 26

## 2022-10-28 PROCEDURE — 99233 SBSQ HOSP IP/OBS HIGH 50: CPT

## 2022-10-28 RX ORDER — HYDROMORPHONE HYDROCHLORIDE 2 MG/ML
0.3 INJECTION INTRAMUSCULAR; INTRAVENOUS; SUBCUTANEOUS
Refills: 0 | Status: DISCONTINUED | OUTPATIENT
Start: 2022-10-28 | End: 2022-10-28

## 2022-10-28 RX ORDER — HYDROMORPHONE HYDROCHLORIDE 2 MG/ML
0.3 INJECTION INTRAMUSCULAR; INTRAVENOUS; SUBCUTANEOUS
Refills: 0 | Status: ACTIVE | OUTPATIENT
Start: 2022-10-28 | End: 2022-11-04

## 2022-10-28 RX ORDER — HYDROMORPHONE HYDROCHLORIDE 2 MG/ML
0.5 INJECTION INTRAMUSCULAR; INTRAVENOUS; SUBCUTANEOUS
Refills: 0 | Status: DISCONTINUED | OUTPATIENT
Start: 2022-10-28 | End: 2022-10-28

## 2022-10-28 RX ORDER — HYDROMORPHONE HYDROCHLORIDE 2 MG/ML
0.7 INJECTION INTRAMUSCULAR; INTRAVENOUS; SUBCUTANEOUS
Refills: 0 | Status: DISCONTINUED | OUTPATIENT
Start: 2022-10-28 | End: 2022-10-28

## 2022-10-28 RX ORDER — HYDROMORPHONE HYDROCHLORIDE 2 MG/ML
0.2 INJECTION INTRAMUSCULAR; INTRAVENOUS; SUBCUTANEOUS
Refills: 0 | Status: DISCONTINUED | OUTPATIENT
Start: 2022-10-28 | End: 2022-10-28

## 2022-10-28 RX ORDER — HYDROMORPHONE HYDROCHLORIDE 2 MG/ML
0.5 INJECTION INTRAMUSCULAR; INTRAVENOUS; SUBCUTANEOUS
Refills: 0 | Status: ACTIVE | OUTPATIENT
Start: 2022-10-28 | End: 2022-11-04

## 2022-10-28 RX ADMIN — Medication 0.5 MILLIGRAM(S): at 17:49

## 2022-10-28 RX ADMIN — PIPERACILLIN AND TAZOBACTAM 25 GRAM(S): 4; .5 INJECTION, POWDER, LYOPHILIZED, FOR SOLUTION INTRAVENOUS at 05:06

## 2022-10-28 RX ADMIN — PIPERACILLIN AND TAZOBACTAM 25 GRAM(S): 4; .5 INJECTION, POWDER, LYOPHILIZED, FOR SOLUTION INTRAVENOUS at 12:01

## 2022-10-28 RX ADMIN — Medication 0.5 MILLIGRAM(S): at 13:29

## 2022-10-28 RX ADMIN — PIPERACILLIN AND TAZOBACTAM 25 GRAM(S): 4; .5 INJECTION, POWDER, LYOPHILIZED, FOR SOLUTION INTRAVENOUS at 21:48

## 2022-10-28 RX ADMIN — Medication 0.5 MILLIGRAM(S): at 21:49

## 2022-10-28 RX ADMIN — LIDOCAINE 1 PATCH: 4 CREAM TOPICAL at 07:50

## 2022-10-28 RX ADMIN — LIDOCAINE 1 PATCH: 4 CREAM TOPICAL at 19:50

## 2022-10-28 RX ADMIN — Medication 1 DROP(S): at 05:06

## 2022-10-28 NOTE — PROVIDER CONTACT NOTE (CHANGE IN STATUS NOTIFICATION) - SITUATION
Pt desatting on non rebreather to 85%. As per order pt changed to high flow NC and 02 dropped to 73%. Pt w/ 1 large soft bloody BM.

## 2022-10-28 NOTE — PROGRESS NOTE ADULT - SUBJECTIVE AND OBJECTIVE BOX
Chuck Narayanan MD  Division of Hospital Medicine  Available on [employer] teams until 7pm  If no response or off-hours, page [phone]  -------------------------------------    Patient is a 89y old  Female who presents with a chief complaint of AMS (28 Oct 2022 11:31)      SUBJECTIVE / OVERNIGHT EVENTS: pt desaturating overnight and this morning despite NRB mask, placed on HFNC  ADDITIONAL REVIEW OF SYSTEMS: pt appears tachypneic but denies any aches/pains, feels comfortable. Minimally verbal. Per HCP Alexey, they would like to pursue comfort measures.    MEDICATIONS  (STANDING):  acetaminophen   IVPB .. 750 milliGRAM(s) IV Intermittent every 6 hours  artificial  tears Solution 1 Drop(s) Both EYES two times a day  busPIRone 7.5 milliGRAM(s) Oral every 12 hours  citalopram 20 milliGRAM(s) Oral daily  lidocaine   4% Patch 1 Patch Transdermal every 24 hours  LORazepam   Injectable 0.5 milliGRAM(s) IV Push every 4 hours  piperacillin/tazobactam IVPB.. 3.375 Gram(s) IV Intermittent every 8 hours  polyethylene glycol 3350 17 Gram(s) Oral daily  senna 2 Tablet(s) Oral at bedtime  traZODone 100 milliGRAM(s) Oral at bedtime    MEDICATIONS  (PRN):  acetaminophen     Tablet .. 650 milliGRAM(s) Oral every 6 hours PRN Mild Pain (1 - 3)  HYDROmorphone  Injectable 0.3 milliGRAM(s) IV Push every 2 hours PRN dyspnea  HYDROmorphone  Injectable 0.3 milliGRAM(s) IV Push every 2 hours PRN mild mod pain  HYDROmorphone  Injectable 0.5 milliGRAM(s) IV Push every 2 hours PRN Severe Pain (7 - 10)      CAPILLARY BLOOD GLUCOSE        I&O's Summary    27 Oct 2022 07:01  -  28 Oct 2022 07:00  --------------------------------------------------------  IN: 2000 mL / OUT: 420 mL / NET: 1580 mL    28 Oct 2022 07:01  -  28 Oct 2022 15:38  --------------------------------------------------------  IN: 0 mL / OUT: 0 mL / NET: 0 mL        PHYSICAL EXAM:  Vital Signs Last 24 Hrs  T(C): 36.9 (28 Oct 2022 13:24), Max: 36.9 (28 Oct 2022 13:24)  T(F): 98.5 (28 Oct 2022 13:24), Max: 98.5 (28 Oct 2022 13:24)  HR: 76 (28 Oct 2022 13:24) (59 - 76)  BP: 144/79 (28 Oct 2022 13:24) (142/75 - 174/84)  BP(mean): --  RR: 18 (28 Oct 2022 13:24) (16 - 18)  SpO2: 96% (28 Oct 2022 13:24) (83% - 96%)    Parameters below as of 28 Oct 2022 13:24  Patient On (Oxygen Delivery Method): nasal cannula  O2 Flow (L/min): 6    CONSTITUTIONAL: mildly short of breath  EYES: PERRLA; conjunctiva and sclera clear  ENMT: MMM  NECK: Supple  RESPIRATORY: diffuse ronchi  CARDIOVASCULAR: RRR, no JVD, no peripheral edema   ABDOMEN: Nontender to palpation, normoactive BS, no guarding/rigidity  MUSCLOSKELETAL:  no clubbing/cyanosis, no joint swelling or tenderness to palpation  PSYCH: unable to assess  NEUROLOGY: CN 2-12 are intact and symmetric; no gross sensory or motor deficits  SKIN: No rashes; no palpable lesions    LABS:                        8.4    11.35 )-----------( 365      ( 28 Oct 2022 08:33 )             26.8     10-28    133<L>  |  101  |  12  ----------------------------<  144<H>  3.4<L>   |  21<L>  |  0.80    Ca    8.2<L>      28 Oct 2022 08:34  Phos  2.6     10-28  Mg     1.7     10-28                  RADIOLOGY & ADDITIONAL TESTS:  Results Reviewed:   Imaging Personally Reviewed:  Electrocardiogram Personally Reviewed:    COORDINATION OF CARE:  Care Discussed with Consultants/Other Providers [Y/N]: palliative, trauma  Prior or Outpatient Records Reviewed [Y/N]:

## 2022-10-28 NOTE — CONSULT NOTE ADULT - ASSESSMENT
89 Y/O F, PMH as previously described  transferred from [facility] for weakness and altered mental status found to have UTI with concomitant PNA likely aspiration . Incidental finding of left 4th and 5th displaced rib suggesting preceding fall/trauma.  Palliative called for goals of care and advance care planning.

## 2022-10-28 NOTE — PROGRESS NOTE ADULT - SUBJECTIVE AND OBJECTIVE BOX
TRAUMA SURGERY DAILY PROGRESS NOTE:     SUBJECTIVE/ROS:     Overnight: Desaturated to the mid 70s, no improvement on NC. Non-rebreather started at bedside and patient suctioned, oxygen improved to high 90s. Will wean. CXR ordered.   Patient seen and evaluated on AM rounds. ***.   Patient otherwise denies nausea, vomiting, chest pain, shortness of breath     OBJECTIVE:  Vital Signs Last 24 Hrs  T(C): 36.7 (28 Oct 2022 00:16), Max: 36.7 (27 Oct 2022 21:03)  T(F): 98 (28 Oct 2022 00:16), Max: 98.1 (27 Oct 2022 21:03)  HR: 63 (28 Oct 2022 00:16) (52 - 70)  BP: 169/74 (28 Oct 2022 00:16) (109/57 - 169/74)  BP(mean): --  RR: 18 (28 Oct 2022 00:16) (18 - 18)  SpO2: 89% (28 Oct 2022 00:16) (89% - 95%)    Parameters below as of 28 Oct 2022 00:16  Patient On (Oxygen Delivery Method): mask, nonrebreather      I&O's Detail    26 Oct 2022 07:01  -  27 Oct 2022 07:00  --------------------------------------------------------  IN:    dextrose 5% + sodium chloride 0.45%: 1520 mL    IV PiggyBack: 400 mL    IV PiggyBack: 500 mL    IV PiggyBack: 300 mL    Oral Fluid: 200 mL  Total IN: 2920 mL    OUT:    Voided (mL): 1050 mL  Total OUT: 1050 mL    Total NET: 1870 mL      27 Oct 2022 07:01  -  28 Oct 2022 02:04  --------------------------------------------------------  IN:    dextrose 5% + sodium chloride 0.45%: 400 mL    IV PiggyBack: 300 mL  Total IN: 700 mL    OUT:    Oral Fluid: 0 mL  Total OUT: 0 mL    Total NET: 700 mL        Daily     Daily   MEDICATIONS  (STANDING):  acetaminophen   IVPB .. 750 milliGRAM(s) IV Intermittent every 6 hours  artificial  tears Solution 1 Drop(s) Both EYES two times a day  busPIRone 7.5 milliGRAM(s) Oral every 12 hours  citalopram 20 milliGRAM(s) Oral daily  dextrose 5% + sodium chloride 0.45%. 1000 milliLiter(s) (100 mL/Hr) IV Continuous <Continuous>  lidocaine   4% Patch 1 Patch Transdermal every 24 hours  piperacillin/tazobactam IVPB.. 3.375 Gram(s) IV Intermittent every 8 hours  polyethylene glycol 3350 17 Gram(s) Oral daily  senna 2 Tablet(s) Oral at bedtime  sorbitol 70%/mineral oil/magnesium hydroxide/glycerin Enema 120 milliLiter(s) Rectal two times a day  traZODone 100 milliGRAM(s) Oral at bedtime    MEDICATIONS  (PRN):  acetaminophen     Tablet .. 650 milliGRAM(s) Oral every 6 hours PRN Mild Pain (1 - 3)  oxyCODONE    IR 2.5 milliGRAM(s) Oral every 6 hours PRN Moderate Pain (4 - 6)      Labs:                        8.3    9.03  )-----------( 318      ( 27 Oct 2022 12:59 )             26.9     10-27    136  |  102  |  14  ----------------------------<  146<H>  3.4<L>   |  21<L>  |  0.79    Ca    8.0<L>      27 Oct 2022 07:19  Phos  3.8     10-27  Mg     1.8     10-27                    Physical Exam:  Constitutional: NAD  Respiratory: Unlabored breathing  Abdomen: Soft, nondistended, NTTP. No rebound or guarding. IZA with minimal bright blood in rectal vault. No hemorrhoids, masses palpable on IZA.   Extremities: WWP, SONG spontaneously           TRAUMA SURGERY DAILY PROGRESS NOTE:     SUBJECTIVE/ROS:     Overnight: Desaturated to the mid 70s, no improvement on NC. Non-rebreather started at bedside and patient suctioned, oxygen improved to high 90s. Will wean. CXR ordered.   Patient seen and evaluated on AM rounds. On 15L nonrebreather.     OBJECTIVE:  Vital Signs Last 24 Hrs  T(C): 36.7 (28 Oct 2022 00:16), Max: 36.7 (27 Oct 2022 21:03)  T(F): 98 (28 Oct 2022 00:16), Max: 98.1 (27 Oct 2022 21:03)  HR: 63 (28 Oct 2022 00:16) (52 - 70)  BP: 169/74 (28 Oct 2022 00:16) (109/57 - 169/74)  BP(mean): --  RR: 18 (28 Oct 2022 00:16) (18 - 18)  SpO2: 89% (28 Oct 2022 00:16) (89% - 95%)    Parameters below as of 28 Oct 2022 00:16  Patient On (Oxygen Delivery Method): mask, nonrebreather      I&O's Detail    26 Oct 2022 07:01  -  27 Oct 2022 07:00  --------------------------------------------------------  IN:    dextrose 5% + sodium chloride 0.45%: 1520 mL    IV PiggyBack: 400 mL    IV PiggyBack: 500 mL    IV PiggyBack: 300 mL    Oral Fluid: 200 mL  Total IN: 2920 mL    OUT:    Voided (mL): 1050 mL  Total OUT: 1050 mL    Total NET: 1870 mL      27 Oct 2022 07:01  -  28 Oct 2022 02:04  --------------------------------------------------------  IN:    dextrose 5% + sodium chloride 0.45%: 400 mL    IV PiggyBack: 300 mL  Total IN: 700 mL    OUT:    Oral Fluid: 0 mL  Total OUT: 0 mL    Total NET: 700 mL        Daily     Daily   MEDICATIONS  (STANDING):  acetaminophen   IVPB .. 750 milliGRAM(s) IV Intermittent every 6 hours  artificial  tears Solution 1 Drop(s) Both EYES two times a day  busPIRone 7.5 milliGRAM(s) Oral every 12 hours  citalopram 20 milliGRAM(s) Oral daily  dextrose 5% + sodium chloride 0.45%. 1000 milliLiter(s) (100 mL/Hr) IV Continuous <Continuous>  lidocaine   4% Patch 1 Patch Transdermal every 24 hours  piperacillin/tazobactam IVPB.. 3.375 Gram(s) IV Intermittent every 8 hours  polyethylene glycol 3350 17 Gram(s) Oral daily  senna 2 Tablet(s) Oral at bedtime  sorbitol 70%/mineral oil/magnesium hydroxide/glycerin Enema 120 milliLiter(s) Rectal two times a day  traZODone 100 milliGRAM(s) Oral at bedtime    MEDICATIONS  (PRN):  acetaminophen     Tablet .. 650 milliGRAM(s) Oral every 6 hours PRN Mild Pain (1 - 3)  oxyCODONE    IR 2.5 milliGRAM(s) Oral every 6 hours PRN Moderate Pain (4 - 6)      Labs:                        8.3    9.03  )-----------( 318      ( 27 Oct 2022 12:59 )             26.9     10-27    136  |  102  |  14  ----------------------------<  146<H>  3.4<L>   |  21<L>  |  0.79    Ca    8.0<L>      27 Oct 2022 07:19  Phos  3.8     10-27  Mg     1.8     10-27                    Physical Exam:  Constitutional: NAD  Respiratory: Unlabored breathing  Abdomen: Soft, nondistended, NTTP. No rebound or guarding. IZA with minimal bright blood in rectal vault. No hemorrhoids, masses palpable on IZA.   Extremities: WWP, SONG spontaneously

## 2022-10-28 NOTE — PROVIDER CONTACT NOTE (CHANGE IN STATUS NOTIFICATION) - ASSESSMENT
Now on non rebreather 79%. HR 73  /75. Pt non verbal. Answering yes of no question by nodding. Pt denies pain, SOB

## 2022-10-28 NOTE — CONSULT NOTE ADULT - CONVERSATION DETAILS
Spoke with nephew/HCP Alexey Jessica by phone for greater than 25 minutes.   Alexey shares that patient has expressed her wishes many times about a peaceful , pain free passing.   Over the last few months, patient was telling family and friends how tired she is and how ready she is to die.  Alexey wishes to honor his aunts wishes and wants to pursue a symptom mediated approach without any further medical interventions including but not limited to : no blood draws, no diagnositics, no PEG, pleasure feeds while awake and as tolerated, no BG testing, no iv fluids, complete course of antibiotics.    Due to patients increased WOB  requiring non rebreather, patient awaits transfer to PCU when bed available.  Alexey has been made aware that if patient defervesces an alternate level of care would need to be discussed:  transfer back to [facility] vs long term care facility.     Palliative will continue to follow while patient awaits available bed Spoke with nephew/HCP Alexey Lopez by phone for greater than 25 minutes.   Alexey shares that patient has expressed her wishes many times about a peaceful , pain free passing.   Over the last few months, patient was telling family and friends how tired she is and how ready she is to die.  Alexey wishes to honor his aunts wishes and wants to pursue a symptom mediated approach without any further medical interventions including but not limited to : no blood draws, no diagnositics, no PEG, pleasure feeds while awake and as tolerated, no BG testing, no iv fluids, complete course of antibiotics.    Due to patients increased WOB  requiring non rebreather, patient awaits transfer to PCU when bed available.  Alexey has been made aware that if patient defervesces an alternate level of care would need to be discussed:  transfer back to [facility] vs long term care facility.     Palliative will continue to follow while patient awaits available bed    Addendum:   14:40    Received HCP documentation via email (will copy and place in chart)  appointing cousin Kecia Romeo, 84 y/o and  currently disabled and unable to take on responsibility of medical decisions and her nephew Alexey Lopez for whom we have been speaking to regarding patient.

## 2022-10-28 NOTE — CONSULT NOTE ADULT - PROBLEM SELECTOR RECOMMENDATION 9
Cxr with presumed PNA   Zosyn IV x 10 days  Complete course of antibiotics   On non rebreather, noted WOB  Start low dose dilaudid IVP PRN

## 2022-10-28 NOTE — PROGRESS NOTE ADULT - ASSESSMENT
89F hx dementia, anxiety/depression and HTN presenting from [facility] with weakness and encephalopathy, found to have L displaced 4-6 rib fractures suggesting a preceding trauma or fall, unclear if syncopal vs. mechanical, found also to have citrobacter UTI and uptrending white count despite appropriate antibiotics concerning for concomitant PNA.     #Toxic metabolic encephalopathy- likely 2/2 acute blood loss anemia from acute BRBPR overnight. Pt minimally arousable to verbal/tactile stimuli  - discussed status with HCP Alexey- who would like to continue current level of care, forego any GI/endoscopic evaluation or any further escalation of care  - holding fluids due to concern for overload  - comfort care measures- pt accepted to PCU pending bed availability    #BRBPR- endoscopy not in line with GOC. Lab draws not in line with GOC- pt is comfort care now, continue monitoring/clean as necessary    1. Rib fractures- L sided displaced rib fractures suggest a preceding trauma or fall. Unclear if fall would have been syncopal or mechanical, but in the setting of active infections, syncopal from hypovolemia is possible. It appears fall was unwitnessed and not noticed as patient may have gotten herself up and not notified anyone.   - recommend tele monitoring at least x 24 hours  - s/p ceftriaxone for UTI, would start zosyn for PNA seen on CXR- anticipate 5-7 day course  - pt has decompensated, family would like to pursue comfort care    2. HTN- 's. Goal SBP for age would be 130-140's  - cont antihypertensives with stricter hold parameters in setting of active bleed    3. Leukocytosis- uptrended white count and positive Ucx growing citrobacter pansensitive. Now elevated likely 2/2 acute GI bleed  - s/p ceftriaxone x 3 days  - white count continued to uptrend- review of CXR reveals L retrocardiac opacity, possible PNA  - will start zosyn 3.375gm iv q 8 hours for presumed PNA- anticipate treatment (through 10/29)    4. anxiety/depression- cont home buspirone, celexa, trazodone  - cont holding klonopin  - may resume mirtazapine if tolerating the above    5. Dysphagia- poor swallow function, likely aspiration PNA. Per s/s patient is not a candidate for oral feeds.   - discussed with HCP Nephparag Blackburn- pt to continue pleasure feeds, accepts risk of future aspiration and decompensation. She is not eating at the moment due to mental status.  - GOC as above    dispo: PCU pending bed availability.

## 2022-10-28 NOTE — PROGRESS NOTE ADULT - ASSESSMENT
89 yo female with a PMHx of HTN, presents from [facility] where she was lethargic and weak since last night and hypoxic since this morning. Found to have acute displaced fractures of the left fourth through sixth ribs.    Plan:  - CXR OVN unremarkable compared to prior on 10/23  - GI - no plan to scope  - GOC to be discussed with family today, pt DNR/DNI, assess feeding status  - Multimodal pain control for rib fractues  - zosyn for PNA on xray   - discontinued haldol/clonazepam/other sedating medications in effort to reduce drowsiness  -metoprolol 25xl qd  nifedipine restarted  -appreciate Medicine recs   - Holding DVT ppx i/s/o BRBPR    - SCDs   - puree diet  - OOB  - IS  - TEENA  - AM labs      ACS  P# [id number] 89 yo female with a PMHx of HTN, presents from [facility] where she was lethargic and weak since last night and hypoxic since this morning. Found to have acute displaced fractures of the left fourth through sixth ribs.    Plan:  - cont HFNC  - CXR OVN unremarkable compared to prior on 10/23  - GI - no plan to scope  - Multimodal pain control for rib fractues  - zosyn for PNA on xray   - discontinued haldol/clonazepam/other sedating medications in effort to reduce drowsiness  -metoprolol 25xl qd  nifedipine restarted  -appreciate Medicine recs  - Holding DVT ppx i/s/o BRBPR    - SCDs   - puree diet  - OOB/IS  - Hospice / Palliative pending  - [facility] d/w family via Dr. Narayanan hospitalist,  comfort care, no more blood draws, dilaudid prn SOB/comfort      ACS  P# [id number]

## 2022-10-28 NOTE — CONSULT NOTE ADULT - SUBJECTIVE AND OBJECTIVE BOX
HPI:  Patient is a 89 yo female with a PMHx of HTN, presents from [facility] where she was lethargic and weak since last night and hypoxic since this morning. Pt AAOx3 here though not too interactive. Pt comes on 5L NC sats mid 90s. Denies any pain anywhere, denies any fevers/chills, cough, chest pain, abd pain, n/v/d, urinary sxs    Patient seen and examined in ED. Hemodynamically stable, lethargic and sleepy but A&Ox3 when woken up. Denies chest pain, abdominal pain, any trauma or abuse. . No bruises or signs of trauma on physical exam.   (19 Oct 2022 21:50)    PERTINENT PM/SXH:   Pacemaker    Anxiety    Depression    Hypertension    GERD (gastroesophageal reflux disease)    Osteopenia    Insomnia    Spastic bladder    Vitamin B12 deficiency    No pertinent past medical history    Constipation    Paroxysmal atrial fibrillation    Anxiety    HTN (hypertension)    GERD (gastroesophageal reflux disease)      S/P hysterectomy    No significant past surgical history    Small bowel volvulus      FAMILY HISTORY:    Family Hx substance abuse [ ]yes [ ]no  ITEMS NOT CHECKED ARE NOT PRESENT    SOCIAL HISTORY:   Significant other/partner[ ]  Children[ ]  Sabianist/Spirituality:  Substance hx:  [ ]   Tobacco hx:  [ ]   Alcohol hx: [ ]   Home Opioid hx:  [ ] I-Stop Reference No:  Living Situation: [ ]Home  [ ]Long term care  [ ]Rehab [X ]Other  [facility] ASSISTED LIVING     ADVANCE DIRECTIVES:    DNR/MOLST  [X ]  Living Will  [ ]   DECISION MAKER(s):  [X ] Health Care Proxy(s)  [ ] Surrogate(s)  [ ] Guardian           Name(s): Phone Number(s):  KARIN GREENE   BASELINE (I)ADL(s) (prior to admission):  [place]: [ ]Total  [ X] Moderate [ ]Dependent    Allergies    No Known Allergies    Intolerances    MEDICATIONS  (STANDING):  acetaminophen   IVPB .. 750 milliGRAM(s) IV Intermittent every 6 hours  artificial  tears Solution 1 Drop(s) Both EYES two times a day  busPIRone 7.5 milliGRAM(s) Oral every 12 hours  citalopram 20 milliGRAM(s) Oral daily  lidocaine   4% Patch 1 Patch Transdermal every 24 hours  LORazepam   Injectable 0.5 milliGRAM(s) IV Push every 4 hours  piperacillin/tazobactam IVPB.. 3.375 Gram(s) IV Intermittent every 8 hours  polyethylene glycol 3350 17 Gram(s) Oral daily  senna 2 Tablet(s) Oral at bedtime  traZODone 100 milliGRAM(s) Oral at bedtime    MEDICATIONS  (PRN):  acetaminophen     Tablet .. 650 milliGRAM(s) Oral every 6 hours PRN Mild Pain (1 - 3)  HYDROmorphone  Injectable 0.3 milliGRAM(s) IV Push every 2 hours PRN dyspnea  HYDROmorphone  Injectable 0.3 milliGRAM(s) IV Push every 2 hours PRN mild mod pain  HYDROmorphone  Injectable 0.5 milliGRAM(s) IV Push every 2 hours PRN Severe Pain (7 - 10)    PRESENT SYMPTOMS: [X ]Unable to self-report  [ ] CPOT [ X] PAINADs [ ] RDOS  Source if other than patient:  [ ]Family   [ ]Team     Pain: [ ]yes [ ]no  QOL impact -   Location -                    Aggravating factors -  Quality -  Radiation -  Timing-  Severity (0-10 scale):  Minimal acceptable level (0-10 scale):     CPOT:    https://www.[facility].org/getattachment/pns44u62-4e6q-0k0y-4u4e-0761m3272o6q/Critical-Care-Pain-Observation-Tool-(CPOT)    PAIN AD Score: 0  http://geriatrictoolkit.[place].[facility]/cog/painad.pdf (press ctrl +  left click to view)    Dyspnea:                           [ ]Mild [ ]Moderate [X ]Severe      RDOS:  0 to 2  minimal or no respiratory distress   3  mild distress  4 to 6 moderate distress  >7  >7 severe distress  https://homecareinformation.net/handouts/hen/Respiratory_Distress_Observation_Scale.pdf (Ctrl +  left click to view)     Anxiety:                             [ ]Mild [X ]Moderate [ ]Severe  Fatigue:                             [ ]Mild [ X]Moderate [ ]Severe  Nausea:                             [ ]Mild [ ]Moderate [ ]Severe  Loss of appetite:              [ ]Mild [ X]Moderate [ ]Severe  Constipation:                    [ ]Mild [ ]Moderate [ ]Severe    PCSSQ [Palliative Care Spiritual Screening Question]   Severity (0-10):0  Score of 4 or > indicate consideration of Chaplaincy referral.  Chaplaincy Referral: [ ] yes [ ] refused [ ] following   DEFERRED     Caregiver [place]? : [ ] yes [X ] no  Social work referral [ ] Patient & Family Centered Care Referral [ ]     Anticipatory Grief Present?: [ ] yes [ X] no  Social work referral [ ]  Patient & Family Centered Care Referral [ ]       Other Symptoms:  [X ]All other review of systems negative     Palliative Performance Status Version 2:    20     %    http://[facility].org/files/news/palliative_performance_scale_ppsv2.pdf  PHYSICAL EXAM:  Vital Signs Last 24 Hrs  T(C): 36.6 (28 Oct 2022 09:01), Max: 36.7 (27 Oct 2022 21:03)  T(F): 97.8 (28 Oct 2022 09:01), Max: 98.1 (27 Oct 2022 21:03)  HR: 71 (28 Oct 2022 10:00) (52 - 71)  BP: 142/75 (28 Oct 2022 09:01) (109/57 - 174/84)  BP(mean): --  RR: 16 (28 Oct 2022 10:00) (16 - 18)  SpO2: 88% (28 Oct 2022 10:00) (83% - 95%)    Parameters below as of 28 Oct 2022 10:00  Patient On (Oxygen Delivery Method): mask, nonrebreather     I&O's Summary    27 Oct 2022 07:01  -  28 Oct 2022 07:00  --------------------------------------------------------  IN: 2000 mL / OUT: 420 mL / NET: 1580 mL    28 Oct 2022 07:01  -  28 Oct 2022 11:35  --------------------------------------------------------  IN: 0 mL / OUT: 0 mL / NET: 0 mL      GENERAL: [ ]Cachexia    [ ]Alert  [ ]Oriented x   [X ]Lethargic  []Unarousable  [ X]  MINIMALLY Verbal  [ ]Non-Verbal  Behavioral:   [ X] Anxiety  [ ] Delirium [ ] Agitation [ ] Other  HEENT:  [ ]Normal   [ X]Dry mouth   [ ]ET Tube/Trach  [ ]Oral lesions  PULMONARY:   [ ]Clear [ X]Tachypnea  [ X]Audible excessive secretions   [ ]Rhonchi        [ ]Right [ ]Left [ ]Bilateral  [ ]Crackles        [ ]Right [ ]Left [ ]Bilateral  [ ]Wheezing     [ ]Right [ ]Left [ ]Bilateral  [X ]Diminished breath sounds [ ]right [ ]left [ ]bilateral  CARDIOVASCULAR:    [ ]Regular [ X]Irregular [ ]Tachy  [ ]Kevan [ ]Murmur [ ]Other  GASTROINTESTINAL:  [ X]Soft  [ ]Distended   [ X]+BS  [ ]Non tender [ ]Tender  [ ]Other [ ]PEG [ ]OGT/ NGT  Last BM:  GENITOURINARY:  [ ]Normal [ X] Incontinent   [ ]Oliguria/Anuria   [ ]Spangler  MUSCULOSKELETAL:   [ ]Normal   [ ]Weakness  [X ]Bed/Wheelchair bound [ ]Edema  NEUROLOGIC:   [ ]No focal deficits  [X]Cognitive impairment  [ ]Dysphagia [ ]Dysarthria [ ]Paresis [ ]Other   SKIN:   [X ]Normal  [ ]Rash  [ ]Other  [ ]Pressure ulcer(s)       Present on admission [ ]y [ ]n    CRITICAL CARE:  [ ] Shock Present  [ ]Septic [ ]Cardiogenic [ ]Neurologic [ ]Hypovolemic  [ ]  Vasopressors [ ]  Inotropes   [ X]Respiratory failure present [ ]Mechanical ventilation [ X]Non-invasive ventilatory support [ ]High flow    [X ]Acute  [ ]Chronic [ ]Hypoxic  [ ]Hypercarbic [ ]Other  [ ]Other organ failure     LABS:                        8.4    11.35 )-----------( 365      ( 28 Oct 2022 08:33 )             26.8   10-28    133<L>  |  101  |  12  ----------------------------<  144<H>  3.4<L>   |  21<L>  |  0.80    Ca    8.2<L>      28 Oct 2022 08:34  Phos  2.6     10-28  Mg     1.7     10-28          RADIOLOGY & ADDITIONAL STUDIES:  < from: Xray Chest 1 View- PORTABLE-Urgent (Xray Chest 1 View- PORTABLE-Urgent .) (10.28.22 @ 02:39) >    ACC: [id number] EXAM:  XR CHEST PORTABLE URGENT 1V                          PROCEDURE DATE:  10/28/2022          INTERPRETATION:  EXAMINATION: XR CHEST URGENT    CLINICAL INDICATION: Hypoxia    TECHNIQUE: Single frontal portable view of the chest from 10/28/2022 2:39   AM    COMPARISON: Chest x-ray 10/23/2022    FINDINGS:    The heart size is not accurately assessed on this projection.  Compared to prior radiograph there is an area of increased opacification   at the right lung bases. There is also a small right-sided pleural   effusion.  There is improved aeration of the left lung compared to prior   radiograph.  There is no pneumothorax.  Redemonstration of left lateral fourth through sixth rib fractures.    IMPRESSION:  Worsening right basilar atelectasis versus developing consolidation.  CT chest can be considered for further assessment if clinically indicated.    --- End of Report ---          < end of copied text >  < from: CT Head No Cont (10.19.22 @ 18:03) >    ACC: [id number] EXAM:  CT BRAIN                          PROCEDURE DATE:  10/19/2022          INTERPRETATION:  HISTORY: Altered mental status, unarousable.    COMPARISON: Head CT dated 6/6/2020.    TECHNIQUE: Axial noncontrast CT images from the skull base to the vertex   were obtained and submitted for interpretation. Coronal and sagittal   reformatted images were performed. Bone and soft tissue windows were   evaluated.    FINDINGS:    There is no acute intracranial mass-effect, hemorrhage, midline shift, or   abnormal extra-axial fluid collection. Gray-white differentiation is   maintained.    Chronic-appearing lacunar infarct in the left basal ganglia. Moderate to   severe chronic white matter microvascular changes. There is age-related   cerebral volume loss. No hydrocephalus. Basal cisterns are patent.    Moderate right maxillary sinus mucosal thickening. The mastoid air cells   are clear. Calvarium is intact.    IMPRESSION:    No acute intracranial bleeding.  Chronic left gangliocapsular lacunar infarctions.    --- End of Report ---          < end of copied text >  < from: CT Abdomen and Pelvis No Cont (10.09.22 @ 23:36) >    ACC: [id number] EXAM:  CT ABDOMEN AND PELVIS                          PROCEDURE DATE:  10/09/2022          INTERPRETATION:  CLINICAL INFORMATION: Vaginal bleeding.    COMPARISON: CT abdomen and pelvis 8/8/2021.    CONTRAST/COMPLICATIONS:  IV Contrast: NONE  Oral Contrast: NONE  Complications: None reported at time of study completion    PROCEDURE:  CT of the Abdomen and Pelvis was performed.  Sagittal and coronal reformats were performed.    FINDINGS:  LOWER CHEST: Small bilateral pleural effusions and bibasilar   atelectasis/consolidation. Right middle lobe nodule, 4 mm (2:10). Mitral   annular calcification. Cardiomegaly. Calcifications of the aortic valve   leaflets and mitral annulus.    LIVER: Few bilobar cysts.  BILE DUCTS: Dilatation of the common bile duct at 1.2 cm, similar to   prior study. No discrete choledocholithiasis.  GALLBLADDER: Within normal limits.  SPLEEN: Within normal limits.  PANCREAS: Within normal limits.  ADRENALS: Within normal limits.  KIDNEYS/URETERS: No right or left hydroureteronephrosis. Punctate   nonobstructing left intrarenal calculus. Left renal cyst and focal areas   of left renal cortical thinning/scarring.    BLADDER: Within normal limits.  REPRODUCTIVE ORGANS: Hysterectomy.    BOWEL: Moderate to large amount of stool throughout the colon and rectum   No bowel obstruction. Appendix is normal.  PERITONEUM: No ascites, pneumoperitoneum, or loculated collection. No   mesenteric lymphadenopathy.  VESSELS: Atherosclerotic calcifications of the aortoiliac tree. Normal   caliber abdominal aorta.  RETROPERITONEUM/LYMPH NODES: No lymphadenopathy.  ABDOMINAL WALL: Postsurgical changes.  BONES: Acute appearing left transverse process fractures from L1 through   L3. Degenerative changes of the spineand hips.    IMPRESSION:    No CT findings to explain patient's vaginal bleeding. Post hysterectomy.    Acute appearing left transverse process fractures from L1 through L3.    Moderate to large amount of stool throughout the colon and rectum.    Trace bilateral pleural effusions and bibasilar atelectasis/consolidation.        --- End of Report ---        < end of copied text >      PROTEIN CALORIE MALNUTRITION PRESENT: [ ]mild [X ]moderate [ ]severe [ ]underweight [ ]morbid obesity  https://www.andeal.org/vault/2440/web/files/ONC/Table_Clinical%20Characteristics%20to%20Document%20Malnutrition-White%20JV%20et%20al%859695.pdf    Height (cm): 157.5 (10-19-22 @ 13:15), 157.5 (10-09-22 @ 18:16), 157.5 (09-02-22 @ 13:20)  Weight (kg): 48.4 (10-19-22 @ 23:36), 59 (09-02-22 @ 13:20)  BMI (kg/m2): 19.5 (10-19-22 @ 23:36), 23.8 (10-19-22 @ 13:15), 23.8 (10-09-22 @ 18:16)    [ ]PPSV2 < or = to 30% [ ]significant weight loss  [ ]poor nutritional intake  [ ]anasarca[ ]Artificial Nutrition      Other REFERRALS:  [ ]Hospice  [ ]Child Life  [ ]Social Work  [X ]Case management [ ]Holistic Therapy     Goals of Care Document:

## 2022-10-29 RX ADMIN — LIDOCAINE 1 PATCH: 4 CREAM TOPICAL at 00:54

## 2022-10-29 RX ADMIN — PIPERACILLIN AND TAZOBACTAM 25 GRAM(S): 4; .5 INJECTION, POWDER, LYOPHILIZED, FOR SOLUTION INTRAVENOUS at 04:54

## 2022-10-29 RX ADMIN — Medication 0.5 MILLIGRAM(S): at 06:56

## 2022-10-29 RX ADMIN — PIPERACILLIN AND TAZOBACTAM 25 GRAM(S): 4; .5 INJECTION, POWDER, LYOPHILIZED, FOR SOLUTION INTRAVENOUS at 13:23

## 2022-10-29 RX ADMIN — LIDOCAINE 1 PATCH: 4 CREAM TOPICAL at 15:28

## 2022-10-29 RX ADMIN — PIPERACILLIN AND TAZOBACTAM 25 GRAM(S): 4; .5 INJECTION, POWDER, LYOPHILIZED, FOR SOLUTION INTRAVENOUS at 23:56

## 2022-10-29 RX ADMIN — Medication 0.5 MILLIGRAM(S): at 02:22

## 2022-10-29 RX ADMIN — Medication 1 DROP(S): at 06:55

## 2022-10-29 RX ADMIN — Medication 0.5 MILLIGRAM(S): at 10:55

## 2022-10-29 RX ADMIN — LIDOCAINE 1 PATCH: 4 CREAM TOPICAL at 23:57

## 2022-10-29 NOTE — PROGRESS NOTE ADULT - SUBJECTIVE AND OBJECTIVE BOX
TRAUMA SURGERY DAILY PROGRESS NOTE:     SUBJECTIVE/ROS:     Overnight: no acute events   Patient seen and evaluated on AM rounds. ***.   Patient otherwise denies nausea, vomiting, chest pain, shortness of breath     OBJECTIVE:  Vital Signs Last 24 Hrs  T(C): 37.6 (28 Oct 2022 20:49), Max: 37.6 (28 Oct 2022 20:49)  T(F): 99.6 (28 Oct 2022 20:49), Max: 99.6 (28 Oct 2022 20:49)  HR: 88 (28 Oct 2022 20:49) (59 - 88)  BP: 157/76 (28 Oct 2022 20:49) (142/75 - 174/84)  BP(mean): --  RR: 18 (28 Oct 2022 20:49) (16 - 18)  SpO2: 97% (28 Oct 2022 20:49) (83% - 97%)    Parameters below as of 28 Oct 2022 20:49  Patient On (Oxygen Delivery Method): room air      I&O's Detail    27 Oct 2022 07:01  -  28 Oct 2022 07:00  --------------------------------------------------------  IN:    dextrose 5% + sodium chloride 0.45%: 1600 mL    IV PiggyBack: 400 mL  Total IN: 2000 mL    OUT:    Oral Fluid: 0 mL    Voided (mL): 420 mL  Total OUT: 420 mL    Total NET: 1580 mL      28 Oct 2022 07:01  -  29 Oct 2022 00:54  --------------------------------------------------------  IN:    IV PiggyBack: 200 mL  Total IN: 200 mL    OUT:    Oral Fluid: 0 mL  Total OUT: 0 mL    Total NET: 200 mL        Daily     Daily   MEDICATIONS  (STANDING):  acetaminophen   IVPB .. 750 milliGRAM(s) IV Intermittent every 6 hours  artificial  tears Solution 1 Drop(s) Both EYES two times a day  busPIRone 7.5 milliGRAM(s) Oral every 12 hours  citalopram 20 milliGRAM(s) Oral daily  lidocaine   4% Patch 1 Patch Transdermal every 24 hours  LORazepam   Injectable 0.5 milliGRAM(s) IV Push every 4 hours  piperacillin/tazobactam IVPB.. 3.375 Gram(s) IV Intermittent every 8 hours  polyethylene glycol 3350 17 Gram(s) Oral daily  senna 2 Tablet(s) Oral at bedtime  traZODone 100 milliGRAM(s) Oral at bedtime    MEDICATIONS  (PRN):  acetaminophen     Tablet .. 650 milliGRAM(s) Oral every 6 hours PRN Mild Pain (1 - 3)  HYDROmorphone  Injectable 0.3 milliGRAM(s) IV Push every 2 hours PRN dyspnea  HYDROmorphone  Injectable 0.3 milliGRAM(s) IV Push every 2 hours PRN mild mod pain  HYDROmorphone  Injectable 0.5 milliGRAM(s) IV Push every 2 hours PRN Severe Pain (7 - 10)      Labs:                        8.4    11.35 )-----------( 365      ( 28 Oct 2022 08:33 )             26.8     10-28    133<L>  |  101  |  12  ----------------------------<  144<H>  3.4<L>   |  21<L>  |  0.80    Ca    8.2<L>      28 Oct 2022 08:34  Phos  2.6     10-28  Mg     1.7     10-28          Physical Exam:  Constitutional: NAD  Respiratory: Unlabored breathing  Abdomen: Soft, nondistended, NTTP. No rebound or guarding.  Extremities: WWP, SONG spontaneously         TRAUMA SURGERY DAILY PROGRESS NOTE:     SUBJECTIVE/ROS:     Overnight: no acute events   Patient seen and evaluated on AM rounds. patient resting comfortably in bed.   Patient otherwise denies nausea, vomiting, chest pain, shortness of breath     OBJECTIVE:  Vital Signs Last 24 Hrs  T(C): 37.6 (28 Oct 2022 20:49), Max: 37.6 (28 Oct 2022 20:49)  T(F): 99.6 (28 Oct 2022 20:49), Max: 99.6 (28 Oct 2022 20:49)  HR: 88 (28 Oct 2022 20:49) (59 - 88)  BP: 157/76 (28 Oct 2022 20:49) (142/75 - 174/84)  BP(mean): --  RR: 18 (28 Oct 2022 20:49) (16 - 18)  SpO2: 97% (28 Oct 2022 20:49) (83% - 97%)    Parameters below as of 28 Oct 2022 20:49  Patient On (Oxygen Delivery Method): room air      I&O's Detail    27 Oct 2022 07:01  -  28 Oct 2022 07:00  --------------------------------------------------------  IN:    dextrose 5% + sodium chloride 0.45%: 1600 mL    IV PiggyBack: 400 mL  Total IN: 2000 mL    OUT:    Oral Fluid: 0 mL    Voided (mL): 420 mL  Total OUT: 420 mL    Total NET: 1580 mL      28 Oct 2022 07:01  -  29 Oct 2022 00:54  --------------------------------------------------------  IN:    IV PiggyBack: 200 mL  Total IN: 200 mL    OUT:    Oral Fluid: 0 mL  Total OUT: 0 mL    Total NET: 200 mL        Daily     Daily   MEDICATIONS  (STANDING):  acetaminophen   IVPB .. 750 milliGRAM(s) IV Intermittent every 6 hours  artificial  tears Solution 1 Drop(s) Both EYES two times a day  busPIRone 7.5 milliGRAM(s) Oral every 12 hours  citalopram 20 milliGRAM(s) Oral daily  lidocaine   4% Patch 1 Patch Transdermal every 24 hours  LORazepam   Injectable 0.5 milliGRAM(s) IV Push every 4 hours  piperacillin/tazobactam IVPB.. 3.375 Gram(s) IV Intermittent every 8 hours  polyethylene glycol 3350 17 Gram(s) Oral daily  senna 2 Tablet(s) Oral at bedtime  traZODone 100 milliGRAM(s) Oral at bedtime    MEDICATIONS  (PRN):  acetaminophen     Tablet .. 650 milliGRAM(s) Oral every 6 hours PRN Mild Pain (1 - 3)  HYDROmorphone  Injectable 0.3 milliGRAM(s) IV Push every 2 hours PRN dyspnea  HYDROmorphone  Injectable 0.3 milliGRAM(s) IV Push every 2 hours PRN mild mod pain  HYDROmorphone  Injectable 0.5 milliGRAM(s) IV Push every 2 hours PRN Severe Pain (7 - 10)      Labs:                        8.4    11.35 )-----------( 365      ( 28 Oct 2022 08:33 )             26.8     10-28    133<L>  |  101  |  12  ----------------------------<  144<H>  3.4<L>   |  21<L>  |  0.80    Ca    8.2<L>      28 Oct 2022 08:34  Phos  2.6     10-28  Mg     1.7     10-28          Physical Exam:  Constitutional: NAD  Respiratory: Patient utilizing NC  Abdomen: Soft, nondistended, NTTP. No rebound or guarding.  Extremities: WWP, SONG spontaneously

## 2022-10-29 NOTE — PROGRESS NOTE ADULT - ASSESSMENT
89 yo female with a PMHx of HTN, presents from [facility] where she was lethargic and weak since last night and hypoxic since this morning. Found to have acute displaced fractures of the left fourth through sixth ribs.    Plan:  - Palliative following  - CXR OVN unremarkable compared to prior on 10/23  - GI - no plan to scope  - Multimodal pain control for rib fractues  - zosyn for PNA on xray   - discontinued haldol/clonazepam/other sedating medications in effort to reduce drowsiness  -metoprolol 25xl qd  nifedipine restarted  -appreciate Medicine recs  - Holding DVT ppx i/s/o BRBPR    - SCDs   - puree diet  - OOB/IS  - GOC d/w family via Dr. Narayanan hospitalist,  comfort care, no more blood draws, dilaudid prn SOB/comfort      ACS  P# [id number] 89 yo female with a PMHx of HTN, presents from [facility] where she was lethargic and weak since last night and hypoxic since this morning. Found to have acute displaced fractures of the left fourth through sixth ribs.    Plan:  - Palliative following  - GI - no plan to scope  - Multimodal pain control for rib fractues  - zosyn for PNA on xray   - discontinued haldol/clonazepam/other sedating medications in effort to reduce drowsiness  -metoprolol 25xl qd  nifedipine restarted  -appreciate Medicine recs  - Holding DVT ppx i/s/o BRBPR    - SCDs   - puree diet  - OOB/IS  - GOC d/w family via Dr. Narayanan hospitalist,  comfort care, no more blood draws, dilaudid prn SOB/comfort      ACS  P# [id number]

## 2022-10-30 RX ADMIN — CITALOPRAM 20 MILLIGRAM(S): 10 TABLET, FILM COATED ORAL at 12:18

## 2022-10-30 RX ADMIN — PIPERACILLIN AND TAZOBACTAM 25 GRAM(S): 4; .5 INJECTION, POWDER, LYOPHILIZED, FOR SOLUTION INTRAVENOUS at 06:50

## 2022-10-30 RX ADMIN — POLYETHYLENE GLYCOL 3350 17 GRAM(S): 17 POWDER, FOR SOLUTION ORAL at 12:18

## 2022-10-30 RX ADMIN — PIPERACILLIN AND TAZOBACTAM 25 GRAM(S): 4; .5 INJECTION, POWDER, LYOPHILIZED, FOR SOLUTION INTRAVENOUS at 21:02

## 2022-10-30 RX ADMIN — Medication 1 DROP(S): at 18:45

## 2022-10-30 RX ADMIN — Medication 7.5 MILLIGRAM(S): at 12:18

## 2022-10-30 RX ADMIN — PIPERACILLIN AND TAZOBACTAM 25 GRAM(S): 4; .5 INJECTION, POWDER, LYOPHILIZED, FOR SOLUTION INTRAVENOUS at 12:17

## 2022-10-30 RX ADMIN — LIDOCAINE 1 PATCH: 4 CREAM TOPICAL at 11:25

## 2022-10-30 NOTE — PROGRESS NOTE ADULT - SUBJECTIVE AND OBJECTIVE BOX
TRAUMA SURGERY DAILY PROGRESS NOTE:     SUBJECTIVE/ROS:     Overnight: NAEO     OBJECTIVE:  Vital Signs Last 24 Hrs  T(C): 36.8 (29 Oct 2022 21:19), Max: 37.2 (29 Oct 2022 09:06)  T(F): 98.3 (29 Oct 2022 21:19), Max: 98.9 (29 Oct 2022 09:06)  HR: 88 (29 Oct 2022 21:19) (71 - 88)  BP: 122/84 (29 Oct 2022 10:00) (116/59 - 122/84)  BP(mean): --  RR: 18 (29 Oct 2022 21:19) (18 - 18)  SpO2: 94% (29 Oct 2022 21:19) (94% - 98%)    Parameters below as of 29 Oct 2022 21:19  Patient On (Oxygen Delivery Method): nasal cannula    I&O's Summary    28 Oct 2022 07:01  -  29 Oct 2022 07:00  --------------------------------------------------------  IN: 400 mL / OUT: 0 mL / NET: 400 mL    29 Oct 2022 07:01  -  30 Oct 2022 03:14  --------------------------------------------------------  IN: 100 mL / OUT: 0 mL / NET: 100 mL        Daily     Daily   MEDICATIONS  (STANDING):  acetaminophen   IVPB .. 750 milliGRAM(s) IV Intermittent every 6 hours  artificial  tears Solution 1 Drop(s) Both EYES two times a day  busPIRone 7.5 milliGRAM(s) Oral every 12 hours  citalopram 20 milliGRAM(s) Oral daily  dextrose 5% + sodium chloride 0.45%. 1000 milliLiter(s) (100 mL/Hr) IV Continuous <Continuous>  lidocaine   4% Patch 1 Patch Transdermal every 24 hours  piperacillin/tazobactam IVPB.. 3.375 Gram(s) IV Intermittent every 8 hours  polyethylene glycol 3350 17 Gram(s) Oral daily  senna 2 Tablet(s) Oral at bedtime  sorbitol 70%/mineral oil/magnesium hydroxide/glycerin Enema 120 milliLiter(s) Rectal two times a day  traZODone 100 milliGRAM(s) Oral at bedtime    MEDICATIONS  (PRN):  acetaminophen     Tablet .. 650 milliGRAM(s) Oral every 6 hours PRN Mild Pain (1 - 3)  oxyCODONE    IR 2.5 milliGRAM(s) Oral every 6 hours PRN Moderate Pain (4 - 6)      Labs:                        8.3    9.03  )-----------( 318      ( 27 Oct 2022 12:59 )             26.9     10-27    136  |  102  |  14  ----------------------------<  146<H>  3.4<L>   |  21<L>  |  0.79    Ca    8.0<L>      27 Oct 2022 07:19  Phos  3.8     10-27  Mg     1.8     10-27                    Physical Exam:  Constitutional: NAD  Respiratory: Unlabored breathing  Abdomen: Soft, nondistended, NTTP. No rebound or guarding. IZA with minimal bright blood in rectal vault. No hemorrhoids, masses palpable on IZA.   Extremities: WWP, SONG spontaneously

## 2022-10-30 NOTE — PROGRESS NOTE ADULT - ASSESSMENT
89 yo female with a PMHx of HTN, presents from [facility] where she was lethargic and weak since last night and hypoxic since this morning. Found to have acute displaced fractures of the left fourth through sixth ribs.    Plan:  - cont HFNC  - CXR OVN unremarkable compared to prior on 10/23  - GI - no plan to scope  - Multimodal pain control for rib fractues  - zosyn for PNA on xray   - discontinued haldol/clonazepam/other sedating medications in effort to reduce drowsiness  -metoprolol 25xl qd  nifedipine restarted  -appreciate Medicine recs  - Holding DVT ppx i/s/o BRBPR    - SCDs   - puree diet  - OOB/IS  - Hospice / Palliative pending  - [facility] d/w family via Dr. Narayanan hospitalist,  comfort care, no more blood draws, dilaudid prn SOB/comfort      ACS  P# [id number]

## 2022-10-31 DIAGNOSIS — Z51.5 ENCOUNTER FOR PALLIATIVE CARE: ICD-10-CM

## 2022-10-31 DIAGNOSIS — R53.2 FUNCTIONAL QUADRIPLEGIA: ICD-10-CM

## 2022-10-31 DIAGNOSIS — R11.0 NAUSEA: ICD-10-CM

## 2022-10-31 PROCEDURE — 99233 SBSQ HOSP IP/OBS HIGH 50: CPT | Mod: GC

## 2022-10-31 RX ORDER — ONDANSETRON 8 MG/1
4 TABLET, FILM COATED ORAL EVERY 6 HOURS
Refills: 0 | Status: DISCONTINUED | OUTPATIENT
Start: 2022-10-31 | End: 2022-11-05

## 2022-10-31 RX ORDER — HYDROMORPHONE HYDROCHLORIDE 2 MG/ML
0.5 INJECTION INTRAMUSCULAR; INTRAVENOUS; SUBCUTANEOUS
Refills: 0 | Status: DISCONTINUED | OUTPATIENT
Start: 2022-10-31 | End: 2022-11-05

## 2022-10-31 RX ORDER — HYDROMORPHONE HYDROCHLORIDE 2 MG/ML
0.3 INJECTION INTRAMUSCULAR; INTRAVENOUS; SUBCUTANEOUS
Refills: 0 | Status: DISCONTINUED | OUTPATIENT
Start: 2022-10-31 | End: 2022-11-05

## 2022-10-31 RX ORDER — ACETAMINOPHEN 500 MG
650 TABLET ORAL EVERY 6 HOURS
Refills: 0 | Status: DISCONTINUED | OUTPATIENT
Start: 2022-10-31 | End: 2022-11-05

## 2022-10-31 RX ADMIN — LIDOCAINE 1 PATCH: 4 CREAM TOPICAL at 00:40

## 2022-10-31 RX ADMIN — LIDOCAINE 1 PATCH: 4 CREAM TOPICAL at 07:21

## 2022-10-31 RX ADMIN — Medication 1 DROP(S): at 17:55

## 2022-10-31 RX ADMIN — Medication 0.5 MILLIGRAM(S): at 15:29

## 2022-10-31 RX ADMIN — PIPERACILLIN AND TAZOBACTAM 25 GRAM(S): 4; .5 INJECTION, POWDER, LYOPHILIZED, FOR SOLUTION INTRAVENOUS at 21:35

## 2022-10-31 RX ADMIN — Medication 1 DROP(S): at 06:35

## 2022-10-31 RX ADMIN — LIDOCAINE 1 PATCH: 4 CREAM TOPICAL at 12:04

## 2022-10-31 RX ADMIN — PIPERACILLIN AND TAZOBACTAM 25 GRAM(S): 4; .5 INJECTION, POWDER, LYOPHILIZED, FOR SOLUTION INTRAVENOUS at 06:07

## 2022-10-31 RX ADMIN — PIPERACILLIN AND TAZOBACTAM 25 GRAM(S): 4; .5 INJECTION, POWDER, LYOPHILIZED, FOR SOLUTION INTRAVENOUS at 13:09

## 2022-10-31 NOTE — CHART NOTE - NSCHARTNOTEFT_GEN_A_CORE
Nutrition Follow Up Note  Patient seen for: nutrition follow-up     Chart reviewed, events noted. Per chart "89 yo female with a PMHx of HTN, presents from [facility] where she was lethargic and weak since last night and hypoxic since this morning. Found to have acute displaced fractures of the left fourth through sixth ribs."     Source: [] Patient       [x] EMR        [X] communication with team     Diet Order:   Diet, Pureed:   Moderately Thick Liquids (MODTHICKLIQS) (10-21-22)    - PO intake :   [] >75%  Adequate    [] 50-75%  Fair       [X] <50%  Poor    - Nutrition-related concerns:      - Pt with poor appetite/PO intake, <50% of meals x >5 days consumed in setting of lethargy      - Ordered for Mighty Shake 3x/day (600 sosa, 21 Gm protein), will continue to provide      - Per chart "[facility] d/w family via Dr. Narayanan hospitalist,  comfort care, no more blood draws, dilaudid prn SOB/comfort"    GI:    - No GI distress reported.   - Last BM 10/38, fecal incontinence.    - Bowel Regimen: Miralax, Senna    Weights:   Height: 62 inches  Dosing Weight: 48.4 kg (10-19)  Daily Weight: 47.9 kg (10-21)  BMI: 19.7 (10-19)    Nutritionally Pertinent MEDICATIONS  (STANDING):  piperacillin/tazobactam IVPB..  polyethylene glycol 3350  senna    Pertinent Labs: no updated labs available, per chart no new blood draws    Skin per nursing documentation: no pressure injury noted   Edema: note noted per nursing documentation     Estimated Needs:   [X] no change from previous assessment    Previous Nutrition Diagnosis: increased nutrient needs  Nutrition Diagnosis is: [X] ongoing  [] resolved [] not applicable     New Nutrition Diagnosis: [X] inadequate protein-energy intake RT decreased ability to consume adequate protein-energy in setting of AMS AEB <50% EER x 5 days     Nutrition Care Plan:  [X] In Progress  [] Achieved  [] Not applicable    Nutrition Interventions:     Education Provided:       [] Yes:  [X] No: n/a       Recommendations:         [X] Continue current diet order: regular. Defer texture/consistency needs to team            [X] RD will continue to provide Mighty Shakes 3x/day            [X] Obtain food preferences as able     [X] Encourage adequate consumption of meals/supplements to optimize protein-energy intake (unless contraindicated)      [X] Add micronutrient supplementation: multivitamin    Monitoring and Evaluation:   Continue to monitor nutritional intake, tolerance to diet prescription, weights, labs, skin integrity    RD remains available upon request and will follow up per protocol  NAHID Garay [facility] Pager #[phone]

## 2022-10-31 NOTE — PROVIDER CONTACT NOTE (OTHER) - REASON
Pt's BP is 182/73
Pt's SpO2 went to 71% suddenly.
Concern for aspiration. Patient unable to eat since yesterday due to swallowing difficulties
Patient is hypertensive (180/89) on assessment and patient unable to take PO meds because AXO0
Pt had bloody BM with multiple blood clots.
Temperature 100.2
Patient bp 178/59.
Patient desatting to mid 80's
Pt complains of nausea
refusing PO meds
Pt's BP is 175/119.
Pt refusing meds, HR 45-55 and rate irregular, non-verbal A+0x0

## 2022-10-31 NOTE — PROGRESS NOTE ADULT - PROBLEM SELECTOR PLAN 4
- PPS 20% needs assistance with all basic needs - s/p course of ceftriaxone, pip/tazo  - Ucx previously grew Citrobacter, sensitive to ceftriaxone and pip/tazo  - pt w/o complaints of dysuria

## 2022-10-31 NOTE — PROVIDER CONTACT NOTE (OTHER) - NAME OF MD/NP/PA/DO NOTIFIED:
MD Armaan Hardy
MD Armaan Hardy
MD Silva Goncalves
MD Stefanie Zamora
MD rooney
Md Geronimo Zamora
carmen pierre
Pilo Coronado
MD Silva Goncalves
MD Silva Goncalves
ROLANDO Daigle
MD Jero Singleton

## 2022-10-31 NOTE — PROVIDER CONTACT NOTE (OTHER) - SITUATION
Pt refusing meds, HR 45-55,  non-verbal A+0x0
Temperature 100.2
Patient is hypertensive (180/89) on assessment.
Pt's BP is 175/119.
Pt's BP is 182/73.
Concern for aspiration. Patient unable to eat since shift change due to swallowing difficulties
Pt had bloody BM with multiple blood clots.
Pt's SpO2 went to 71% suddenly.
Patient bp 178/59.
Patient desatting to mid 80's
pt unable to tolerate PO meds
0

## 2022-10-31 NOTE — PROVIDER CONTACT NOTE (OTHER) - BACKGROUND
10/19 Pt admitted from [facility] [place] assisted living with lethargy and altered mental status. +UA, +UC.
Patient admitted for Left rib fracture. DNR/DNI.
AMS s/p L rib fracture
Patient admitted for AMS.
Patient admitted for altered mental status.
pt admitted for AMS & lethargy
Positive u/a and  urine cx
10/19 Altered mental status, lethargy, hypoxic. CT chest showed b/l pleural effusions and b/l atelectasis. chest x-ray: Left rib fracture 4-6. A&Ox1.
10/19 Pt admitted with lethargy, altered mental status from [facility] living. Left rib fractures noted as well. +UA, +UC.
admitted for AMS, hypoxia, lethargy, Left rib Fx
Pt is A&Ox1. 10/19 came from [facility] assisted living with lethargy, altered mental status. +UA, +UC. Left rib fractures. Pt has trouble swallowing and needs suctioning PRN. Pt is DNR, DNI.
FX of rib. Speech and swallow was ordered and d/c.

## 2022-10-31 NOTE — PROVIDER CONTACT NOTE (OTHER) - DATE AND TIME:
25-Oct-2022 17:24
26-Oct-2022 21:22
31-Oct-2022 22:19
21-Oct-2022 23:33
22-Oct-2022 13:30
23-Oct-2022 03:49
28-Oct-2022 00:03
30-Oct-2022 00:00
25-Oct-2022 04:45
27-Oct-2022 14:05
25-Oct-2022 17:10
26-Oct-2022 22:15

## 2022-10-31 NOTE — PROGRESS NOTE ADULT - PROBLEM SELECTOR PLAN 2
- s/p course of ceftriaxone, pip/tazo  - Ucx previously grew Citrobacter, sensitive to ceftriaxone and pip/tazo  - pt w/o complaints of dysuria - PPSV- 20%. The patient requires nursing assistance with all ADLs  - Supportive care  - Turn and position   - Continue with good skin care

## 2022-10-31 NOTE — PROVIDER CONTACT NOTE (OTHER) - ACTION/TREATMENT ORDERED:
Fluids ordered. Palliative consult ordered.
MD Silva Goncalves came to bedside with [facility] Paci MD to assess pt and recommended chest PT and suctioning.
MD Silva Goncalves aware and came to assess the pt. Ordered STAT CBC and Type and Screens. Ordered abdominal x-ray.
MD Silva Goncalves ordered IVP 5mg Hydralazine. Will continue to monitor.
MD advise to recheck BP in one hour.
Md ordered zofran. Given to pt. Will continue to monitor
Patient put on 2L of O2. Patient O2 increased to 95% after 2L.
held PO meds provider notifed & aware
Pending
ROLANDO Daigle ordered 5mg Hydralazine IV push and 650mg PO tylenol. Will continue to monitor.
PA aware and notified; No interventions ordered for patient.
Pending

## 2022-10-31 NOTE — PROVIDER CONTACT NOTE (OTHER) - RECOMMENDATIONS
Pending
MD notified.
Notified MD Silva Goncalves. Cleaned pt and determined blood was coming from rectum. Jose Manuel STAT CBC. Will continue to monitor.
Notified MD Silva Goncalves. Put nonrebreather mask on and did Chest PT and suctioned back of throat and removed mucus.
PA aware and notified.
MD mcguire
MD mcguire.
MD mcguire.
held PO meds
As per MD if temperature goes to 100.4 will do temperature workup at that time  Patient is also on Zosyn
Notified MD Silva Goncalves. Gave the ordered 5mg IVP Hydralazine.  Will recheck BP in 1 hour.
Notified ROLANDO Daigle. Will continue to monitor. Give 5mg Hydralazine IV push. Give tylenol. Recheck BP after.

## 2022-10-31 NOTE — PROGRESS NOTE ADULT - ATTENDING COMMENTS
89F with HTN, presented from [facility] 10/19 for hypoxia and lethargy, found to have fractures in L ribs 4-6, admitted to SICU. Treated for UTI, later found to have presumed aspiration PNA, currently on abx. Stay c/b bloody BMs, desaturations on NRB, swallowing dysfunction w/ failed SS eval. Pt w/ progressive lethargy and confusion, palliative consulted and GOC conversation held w/ HCP nephew Brendon Lopez. Agreed to comfort care measures given patient had expressed wishes for a peaceful and pain free passing in the past. Now on PCU for symptom management and comfort care.     Patient seen this morning, awake but minimally engaged with conversation. Unable to tolerate oral meds due to poor mental status, had a BM this morning that was non-bloody. Comfortable appearing with PRNs ordered. Called pt's nephew and provided medical updates. Advised to d/c non-essential oral meds to reduce pill burden, he agrees. His wishes is to continue with comfort focused approach in care with a peaceful passing. He is amenable to chaplaincy referral for spiritual support. PCU SW providing support as well.

## 2022-10-31 NOTE — PROGRESS NOTE ADULT - PROBLEM SELECTOR PLAN 3
- likely 2/2 acute illnesses vs delirium vs blood loss anemia  - Pt AOx0, minimally cooperative w/ nursing, unable to take PO medicine - likely 2/2 acute illnesses vs delirium vs blood loss anemia  - Pt AOx0, minimally cooperative w/ nursing, unable to take PO medicine  - hold home PO medications for now - Likely aspiration PNA vs atelectasis given retrocardiac opacity seen on CXR   - 10 day course of zosyn, last dose scheduled for 11/03/22  - Nephew amenable to finishing this course of abx

## 2022-10-31 NOTE — PROGRESS NOTE ADULT - PROBLEM SELECTOR PLAN 1
- Likely aspiration PNA vs atelectasis given retrocardiac opacity seen on CXR   - 10 day course of zosyn, last dose scheduled for 11/03/22  - Nephew amenable to finishing this course of abx - Patient endorsing nausea this morning.  - Zofran 4mg IV q6hr PRN ordered

## 2022-10-31 NOTE — PROGRESS NOTE ADULT - PROBLEM SELECTOR PLAN 5
- Comfort care measures  - no fingersticks, blood draws, diagnostics, IVF  - Pleasure feeds  - Dispo to inpatient hospice vs [facility] pending clinical course - Comfort care measures  - no fingersticks, blood draws, diagnostics, IVF  - Pleasure feeds  - dilaudid 0.3 mg IVP q1h PRN moderate pain, dilaudid 0.5 mg IVP q1h PRN severe pain  - ativan 0.5 mg IVP PRN agitation  - Dispo to inpatient hospice vs [facility] pending clinical course - likely 2/2 acute illnesses vs delirium vs blood loss anemia  - Pt AOx0, minimally cooperative w/ nursing, unable to take PO medicine  - hold home PO medications for now  - Monitor for constipation, urinary retention, pain, hunger, thirst, etc.  Promote sleep wake cycle and reorientation as indicated.   - Ativan 0.5mg IV q1hr PRN

## 2022-10-31 NOTE — PROGRESS NOTE ADULT - ASSESSMENT
89 F w/ PMHx HTN, admitted for AMS and hypoxia, found to have UTI and aspiration PNA, admitted to PCU for comfort care measures. Pt lethargic on admission, intermittently following commands, unable to take PO medications.  89 F w/ PMHx HTN, admitted for AMS and hypoxia, found to have UTI and aspiration PNA, admitted to PCU for comfort care measures. Pt lethargic on admission, intermittently following commands, unable to take PO medications. Patient is now on the PCU for symptom management and comfort care.

## 2022-10-31 NOTE — PROGRESS NOTE ADULT - SUBJECTIVE AND OBJECTIVE BOX
GAP TEAM PALLIATIVE CARE UNIT PROGRESS NOTE:      [  ] Patient on hospice program.    INDICATION FOR PALLIATIVE CARE UNIT SERVICES/Interval HPI: 89 F w/ PMHx of HTN, presented from [facility] 10/19 for hypoxia and lethargy, found to have fractures in L ribs 4-6, admitted to SICU. Treated for UTI, later found to have presumed aspiration PNA, currently on abx. Stay c/b bloody BMs, desaturations on NRB, swallowing dysfunction w/ failed SS eval. Pt w/ progressive lethargy and confusion, palliative consulted and GOC conversation held w/ HCP nephew Brendon Lopez. Agreed to comfort care measures given patient had expressed wishes for a peaceful and pain free passing in the past.    OVERNIGHT EVENTS: 1 BM this AM, no PRN medications used overnight. This morning, endorsing nausea.     DNR on chart: Yes  Yes      Allergies    No Known Allergies    Intolerances    MEDICATIONS  (STANDING):  acetaminophen   IVPB .. 750 milliGRAM(s) IV Intermittent every 6 hours  artificial  tears Solution 1 Drop(s) Both EYES two times a day  busPIRone 7.5 milliGRAM(s) Oral every 12 hours  citalopram 20 milliGRAM(s) Oral daily  lidocaine   4% Patch 1 Patch Transdermal every 24 hours  piperacillin/tazobactam IVPB.. 3.375 Gram(s) IV Intermittent every 8 hours  polyethylene glycol 3350 17 Gram(s) Oral daily  senna 2 Tablet(s) Oral at bedtime  traZODone 100 milliGRAM(s) Oral at bedtime    MEDICATIONS  (PRN):  acetaminophen     Tablet .. 650 milliGRAM(s) Oral every 6 hours PRN Mild Pain (1 - 3)  HYDROmorphone  Injectable 0.5 milliGRAM(s) IV Push every 1 hour PRN dyspnea  HYDROmorphone  Injectable 0.3 milliGRAM(s) IV Push every 1 hour PRN Moderate Pain (4 - 6)  HYDROmorphone  Injectable 0.5 milliGRAM(s) IV Push every 1 hour PRN Severe Pain (7 - 10)  LORazepam   Injectable 0.5 milliGRAM(s) IV Push every 1 hour PRN Agitation    ITEMS UNCHECKED ARE NOT PRESENT    PRESENT SYMPTOMS: [ ]Unable to self-report  [ ]PAINADs [ ]RDOS  Source if other than patient:  [ ]Family   [ ]Team     Pain: [ ] yes [ ] no  QOL impact -   Location -                    Aggravating factors -  Quality -Te  Radiation -  Timing-  Severity (0-10 scale):G  Minimal acceptable level (0-10 scale):     PAINAD Score:  [email or web address]/painad.pdf (Ctrl +  left click to view)    Dyspnea:                           [ ]Mild [ ]Moderate [ ]Severe    RDOS:  0 to 2  minimal or no respiratory distress   3  mild distress  4 to 6 moderate distress  >7 severe distress  [email or web address]://[email or web address]/hen/Respiratory_Distress_Observation_Scale.pdf (Ctrl +  left click to view)     Anxiety:                             [ ]Mild [ ]Moderate [ ]Severe  Fatigue:                             [ ]Mild [ ]Moderate [ ]Severe  Nausea:                             [present, unable to assess severity]Mild [ ]Moderate [ ]Severe  Loss of appetite:              [ ]Mild [ ]Moderate [ ]Severe  Constipation:                    [ ]Mild [ ]Moderate [ ]Severe    PCSSQ[Palliative Care Spiritual Screening Question]   Severity (0-10):  Score of 4 or > indicate consideration of Chaplaincy referral.  Chaplaincy Referral: [ ] yes [ ] refused [ ] following [ ] Deferred     Caregiver [place]? : [ ] yes [ ] no [ ] Deferred [ ] Declined             Social work referral [ ] Patient & Family Centered Care Referral [ ]     Anticipatory Grief present?:  [ ] yes [ ] no  [ ] Deferred                  Social work referral [ ] Chaplaincy Referral [ ]    		  Other Symptoms:  [ ]All other review of systems negative     Palliative Performance Status Version 2:         %         http://npcrc.org/files/news/palliative_performance_scale_ppsv2.pdf  PHYSICAL EXAM:   Vital Signs Last 24 Hrs  T(C): 36.7 (31 Oct 2022 09:26), Max: 37.3 (30 Oct 2022 13:13)  T(F): 98 (31 Oct 2022 09:26), Max: 99.1 (30 Oct 2022 13:13)  HR: 82 (31 Oct 2022 09:26) (64 - 87)  BP: 184/82 (31 Oct 2022 09:26) (128/96 - 184/82)  BP(mean): --  RR: 20 (31 Oct 2022 09:26) (18 - 20)  SpO2: 96% (31 Oct 2022 09:26) (94% - 99%)    Parameters below as of 31 Oct 2022 09:26  Patient On (Oxygen Delivery Method): nasal cannula     I&O's Summary    30 Oct 2022 07:01  -  31 Oct 2022 07:00  --------------------------------------------------------  IN: 380 mL / OUT: 450 mL / NET: -70 mL      GENERAL: [ ] Cachexia  [ ]Alert  [ ]Oriented  [x]Lethargic  [ ]Unarousable  [ ]Verbal  [ ]Non-Verbal  Behavioral:   [ ] Anxiety  [ ] Delirium [ ] Agitation [ ] Other  HEENT:  [x]Normal   [ ]Dry mouth   [ ]ET Tube/Trach  [ ]Oral lesions  PULMONARY:   [x]Clear [ ]Tachypnea  [ ]Audible excessive secretions   [ ]Rhonchi        [ ]Right [ ]Left [ ]Bilateral  [ ]Crackles        [ ]Right [ ]Left [ ]Bilateral  [ ]Wheezing     [ ]Right [ ]Left [ ]Bilateral  [ ]Diminished BS [ ]Right [ ]Left [ ]Bilateral    CARDIOVASCULAR:    [ ]Regular [x]Irregular [ ]Tachy  [ ]Kevan [ ]Murmur [ ]Other  GASTROINTESTINAL:  [x]Soft  [ ]Distended   [x]+BS  [x]Non tender [ ]Tender  [ ]Other [ ]PEG [ ]OGT/ NGT   Last BM: 10/31 AM  GENITOURINARY:  [ ]Normal [ ] Incontinent   [ ]Oliguria/Anuria   [x]Spangler  MUSCULOSKELETAL:   [ ]Normal   [ ]Weakness  [x]Bed/Wheelchair bound [ ]Edema  NEUROLOGIC:   AOx0, opens eyes to voice, follows commands  [ ]No focal deficits  [] Cognitive impairment  [ ] Dysphagia [ ]Dysarthria [ ] Paresis [ ]Other   SKIN:   [x]Normal  [ ]Rash  [ ]Other  [ ]Pressure ulcer(s)  [ ]y [ ]n  Present on admission      CRITICAL CARE:  [ ] Shock Present  [ ]Septic [ ]Cardiogenic [ ]Neurologic [ ]Hypovolemic  [ ]  Vasopressors [ ]  Inotropes   [ ] Respiratory failure present [ ] Mechanical Ventilation [ ] Non-invasive ventilatory support [ ] High-Flow  [ ] Acute  [ ] Chronic [ ] Hypoxic  [ ] Hypercarbic [ ] Other  [ ] Other organ failure     LABS:            RADIOLOGY & ADDITIONAL STUDIES:    PROTEIN CALORIE MALNUTRITION: [ ] mild [ ] moderate [ ] severe  [ ] underweight [ ] morbid obesity    https://www.andeal.org/vault/8380/web/files/ONC/Table_Clinical%20Characteristics%20to%20Document%20Malnutrition-White%20JV%20et%20al%202012.pdf    Height (cm): 157.5 (10-19-22 @ 13:15), 157.5 (10-09-22 @ 18:16), 157.5 (09-02-22 @ 13:20)  Weight (kg): 48.4 (10-19-22 @ 23:36), 59 (09-02-22 @ 13:20)  BMI (kg/m2): 19.5 (10-19-22 @ 23:36), 23.8 (10-19-22 @ 13:15), 23.8 (10-09-22 @ 18:16)    [ ] PPSV2 < or = 30% [ ] significant weight loss [ ] poor nutritional intake [ ] anasarca   Artificial Nutrition [ ]     Other REFERRALS:    [ ] Hospice  [ ]Child Life  [ ]Social Work  [ ]Case management [ ]Holistic Therapy [ ] Physical Therapy [ ] Dietary   Goals of Care Document:  GAP TEAM PALLIATIVE CARE UNIT PROGRESS NOTE:      [  ] Patient on hospice program.    INDICATION FOR PALLIATIVE CARE UNIT SERVICES/Interval HPI: 89 F w/ PMHx of HTN, presented from [facility] 10/19 for hypoxia and lethargy, found to have fractures in L ribs 4-6, admitted to SICU. Treated for UTI, later found to have presumed aspiration PNA, currently on abx. Stay c/b bloody BMs, desaturations on NRB, swallowing dysfunction w/ failed SS eval. Pt w/ progressive lethargy and confusion, palliative consulted and GOC conversation held w/ HCP nephew Brendon Jessica. Agreed to comfort care measures given patient had expressed wishes for a peaceful and pain free passing in the past. Now on PCU for symptom management and comfort care.     OVERNIGHT EVENTS:  Chart reviewed. The patient is seen and examined at the bedside. 1 BM this AM, no PRN medications used within a 24hr period 8am-8am. This morning, endorsing nausea.     DNR on chart: Yes  Yes      Allergies    No Known Allergies    Intolerances    MEDICATIONS  (STANDING):  acetaminophen   IVPB .. 750 milliGRAM(s) IV Intermittent every 6 hours  artificial  tears Solution 1 Drop(s) Both EYES two times a day  busPIRone 7.5 milliGRAM(s) Oral every 12 hours  citalopram 20 milliGRAM(s) Oral daily  lidocaine   4% Patch 1 Patch Transdermal every 24 hours  piperacillin/tazobactam IVPB.. 3.375 Gram(s) IV Intermittent every 8 hours  polyethylene glycol 3350 17 Gram(s) Oral daily  senna 2 Tablet(s) Oral at bedtime  traZODone 100 milliGRAM(s) Oral at bedtime    MEDICATIONS  (PRN):  acetaminophen     Tablet .. 650 milliGRAM(s) Oral every 6 hours PRN Mild Pain (1 - 3)  HYDROmorphone  Injectable 0.5 milliGRAM(s) IV Push every 1 hour PRN dyspnea  HYDROmorphone  Injectable 0.3 milliGRAM(s) IV Push every 1 hour PRN Moderate Pain (4 - 6)  HYDROmorphone  Injectable 0.5 milliGRAM(s) IV Push every 1 hour PRN Severe Pain (7 - 10)  LORazepam   Injectable 0.5 milliGRAM(s) IV Push every 1 hour PRN Agitation    ITEMS UNCHECKED ARE NOT PRESENT    PRESENT SYMPTOMS: [X ]Unable to self-report  [X ]PAINADs [ X]RDOS  Source if other than patient:  [ ]Family   [ X]Team     Pain: [ ] yes [ ] no  QOL impact -   Location -                    Aggravating factors -  Quality -  Radiation -  Timing-  Severity (0-10 scale):  Minimal acceptable level (0-10 scale):     PAINAD Score: 0  http://[email or web address]/painad.pdf (Ctrl +  left click to view)    Dyspnea:                           [ ]Mild [ ]Moderate [ ]Severe    RDOS: 1  0 to 2  minimal or no respiratory distress   3  mild distress  4 to 6 moderate distress  >7 severe distress  [email or web address]://[email or web address]/handouts/hen/Respiratory_Distress_Observation_Scale.pdf (Ctrl +  left click to view)     Anxiety:                             [ ]Mild [ ]Moderate [ ]Severe  Fatigue:                             [ ]Mild [ ]Moderate [ ]Severe  Nausea:                             [X] present, unable to assess severity]Mild[X] [ ]Moderate [ ]Severe  Loss of appetite:              [ ]Mild [ ]Moderate [ ]Severe  Constipation:                    [ ]Mild [ ]Moderate [ ]Severe    PCSSQ[Palliative Care Spiritual Screening Question]   Severity (0-10):  Score of 4 or > indicate consideration of Chaplaincy referral.  Chaplaincy Referral: [ ] yes [ ] refused [ ] following [ X] Deferred     Caregiver [place]? : [ ] yes [ ] no [X ] Deferred [ ] Declined             Social work referral [ ] Patient & Family Centered Care Referral [ ]     Anticipatory Grief present?:  [X ] yes [ ] no  [ ] Deferred                  Social work referral [X ] Chaplaincy Referral [ ]    		  Other Symptoms:  [X ]All other review of systems negative     Palliative Performance Status Version 2:   20%         http://npcrc.org/files/news/palliative_performance_scale_ppsv2.pdf  PHYSICAL EXAM:   Vital Signs Last 24 Hrs  T(C): 36.7 (31 Oct 2022 09:26), Max: 37.3 (30 Oct 2022 13:13)  T(F): 98 (31 Oct 2022 09:26), Max: 99.1 (30 Oct 2022 13:13)  HR: 82 (31 Oct 2022 09:26) (64 - 87)  BP: 184/82 (31 Oct 2022 09:26) (128/96 - 184/82)  BP(mean): --  RR: 20 (31 Oct 2022 09:26) (18 - 20)  SpO2: 96% (31 Oct 2022 09:26) (94% - 99%)    Parameters below as of 31 Oct 2022 09:26  Patient On (Oxygen Delivery Method): nasal cannula     I&O's Summary    30 Oct 2022 07:01  -  31 Oct 2022 07:00  --------------------------------------------------------  IN: 380 mL / OUT: 450 mL / NET: -70 mL      GENERAL: [ ] Cachexia  [ ]Alert  [ ]Oriented  [x]Lethargic  [ ]Unarousable  [X ]Verbal- very minimal  [ ]Non-Verbal  Behavioral:   [ ] Anxiety  [ ] Delirium [ ] Agitation [ ] Other  HEENT:  [x]Normal   [ ]Dry mouth   [ ]ET Tube/Trach  [ ]Oral lesions  PULMONARY:   [x]Clear [ ]Tachypnea  [ ]Audible excessive secretions   [ ]Rhonchi        [ ]Right [ ]Left [ ]Bilateral  [ ]Crackles        [ ]Right [ ]Left [ ]Bilateral  [ ]Wheezing     [ ]Right [ ]Left [ ]Bilateral  [ ]Diminished BS [ ]Right [ ]Left [ ]Bilateral    CARDIOVASCULAR:    [ ]Regular [x]Irregular [ ]Tachy  [ ]Kevan [ ]Murmur [ ]Other  GASTROINTESTINAL:  [x]Soft  [ ]Distended   [x]+BS  [x]Non tender [ ]Tender  [ ]Other [ ]PEG [ ]OGT/ NGT   Last BM: 10/31 AM  GENITOURINARY:  [ ]Normal [ ] Incontinent   [ ]Oliguria/Anuria   [x]Spangler  MUSCULOSKELETAL:   [ ]Normal   [X ]Weakness  [x]Bed/Wheelchair bound [ ]Edema  NEUROLOGIC:   AOx0, opens eyes to voice, follows commands  [ ]No focal deficits  [] Cognitive impairment  [ ] Dysphagia [ ]Dysarthria [ ] Paresis [ ]Other   SKIN: Ecchymosis   [x]Normal  [ ]Rash  [ ]Other  [ ]Pressure ulcer(s)  [ ]y [ ]n  Present on admission      CRITICAL CARE:  [ ] Shock Present  [ ]Septic [ ]Cardiogenic [ ]Neurologic [ ]Hypovolemic  [ ]  Vasopressors [ ]  Inotropes   [ ] Respiratory failure present [ ] Mechanical Ventilation [ ] Non-invasive ventilatory support [ ] High-Flow  [ ] Acute  [ ] Chronic [ ] Hypoxic  [ ] Hypercarbic [ ] Other  [ ] Other organ failure     LABS: Review     RADIOLOGY & ADDITIONAL STUDIES: Review     PROTEIN CALORIE MALNUTRITION: [ ] mild [ ] moderate [ ] severe  [ ] underweight [ ] morbid obesity    https://www.andeal.org/vault/8890/web/files/ONC/Table_Clinical%20Characteristics%20to%20Document%20Malnutrition-White%20JV%20et%20al%566841.pdf    Height (cm): 157.5 (10-19-22 @ 13:15), 157.5 (10-09-22 @ 18:16), 157.5 (09-02-22 @ 13:20)  Weight (kg): 48.4 (10-19-22 @ 23:36), 59 (09-02-22 @ 13:20)  BMI (kg/m2): 19.5 (10-19-22 @ 23:36), 23.8 (10-19-22 @ 13:15), 23.8 (10-09-22 @ 18:16)    [X ] PPSV2 < or = 30% [ ] significant weight loss [ ] poor nutritional intake [ ] anasarca   Artificial Nutrition [ ]     Other REFERRALS:    [ ] Hospice  [ ]Child Life  [ X]Social Work  [ ]Case management [ ]Holistic Therapy [ ] Physical Therapy [ ] Dietary   Goals of Care Document:  GAP TEAM PALLIATIVE CARE UNIT PROGRESS NOTE:      [  ] Patient on hospice program.    INDICATION FOR PALLIATIVE CARE UNIT SERVICES/Interval HPI: 89 F w/ PMHx of HTN, presented from [facility] 10/19 for hypoxia and lethargy, found to have fractures in L ribs 4-6, admitted to SICU. Treated for UTI, later found to have presumed aspiration PNA, currently on abx. Stay c/b bloody BMs, desaturations on NRB, swallowing dysfunction w/ failed SS eval. Pt w/ progressive lethargy and confusion, palliative consulted and GOC conversation held w/ HCP nephew Brendon Jessica. Agreed to comfort care measures given patient had expressed wishes for a peaceful and pain free passing in the past. Now on PCU for symptom management and comfort care.     OVERNIGHT EVENTS:  Chart reviewed. The patient is seen and examined at the bedside. 1 BM this AM, no PRN medications used within a 24hr period 8am-8am. This morning, endorsing nausea.     DNR on chart: Yes  Yes      Allergies    No Known Allergies    Intolerances    MEDICATIONS  (STANDING):  acetaminophen   IVPB .. 750 milliGRAM(s) IV Intermittent every 6 hours  artificial  tears Solution 1 Drop(s) Both EYES two times a day  busPIRone 7.5 milliGRAM(s) Oral every 12 hours  citalopram 20 milliGRAM(s) Oral daily  lidocaine   4% Patch 1 Patch Transdermal every 24 hours  piperacillin/tazobactam IVPB.. 3.375 Gram(s) IV Intermittent every 8 hours  polyethylene glycol 3350 17 Gram(s) Oral daily  senna 2 Tablet(s) Oral at bedtime  traZODone 100 milliGRAM(s) Oral at bedtime    MEDICATIONS  (PRN):  acetaminophen     Tablet .. 650 milliGRAM(s) Oral every 6 hours PRN Mild Pain (1 - 3)  HYDROmorphone  Injectable 0.5 milliGRAM(s) IV Push every 1 hour PRN dyspnea  HYDROmorphone  Injectable 0.3 milliGRAM(s) IV Push every 1 hour PRN Moderate Pain (4 - 6)  HYDROmorphone  Injectable 0.5 milliGRAM(s) IV Push every 1 hour PRN Severe Pain (7 - 10)  LORazepam   Injectable 0.5 milliGRAM(s) IV Push every 1 hour PRN Agitation    ITEMS UNCHECKED ARE NOT PRESENT    PRESENT SYMPTOMS: [X ]Unable to self-report  [X ]PAINADs [ X]RDOS  Source if other than patient:  [ ]Family   [ X]Team     Pain: [ ] yes [ ] no  QOL impact -   Location -                    Aggravating factors -  Quality -  Radiation -  Timing-  Severity (0-10 scale):  Minimal acceptable level (0-10 scale):     PAINAD Score: 0  [email or web address]/cog/painad.pdf (Ctrl +  left click to view)    Dyspnea:                           [ ]Mild [ ]Moderate [ ]Severe    RDOS: 1  0 to 2  minimal or no respiratory distress   3  mild distress  4 to 6 moderate distress  >7 severe distress  [email or web address]://[email or web address]/handouts/hen/Respiratory_Distress_Observation_Scale.pdf (Ctrl +  left click to view)     Anxiety:                             [ ]Mild [ ]Moderate [ ]Severe  Fatigue:                             [ ]Mild [ ]Moderate [ ]Severe  Nausea:                             [X] present, unable to assess severity]Mild[X] [ ]Moderate [ ]Severe  Loss of appetite:              [ ]Mild [ ]Moderate [ ]Severe  Constipation:                    [ ]Mild [ ]Moderate [ ]Severe    PCSSQ[Palliative Care Spiritual Screening Question]   Severity (0-10):  Score of 4 or > indicate consideration of Chaplaincy referral.  Chaplaincy Referral: [ ] yes [ ] refused [ ] following [ X] Deferred     Caregiver [place]? : [ ] yes [ ] no [X ] Deferred [ ] Declined             Social work referral [ ] Patient & Family Centered Care Referral [ ]     Anticipatory Grief present?:  [X ] yes [ ] no  [ ] Deferred                  Social work referral [X ] Chaplaincy Referral [ ]    		  Other Symptoms:  [X ]All other review of systems negative     Palliative Performance Status Version 2:   20%         http://npcrc.org/files/news/palliative_performance_scale_ppsv2.pdf    PHYSICAL EXAM:   Vital Signs Last 24 Hrs  T(C): 36.7 (31 Oct 2022 09:26), Max: 37.3 (30 Oct 2022 13:13)  T(F): 98 (31 Oct 2022 09:26), Max: 99.1 (30 Oct 2022 13:13)  HR: 82 (31 Oct 2022 09:26) (64 - 87)  BP: 184/82 (31 Oct 2022 09:26) (128/96 - 184/82)  BP(mean): --  RR: 20 (31 Oct 2022 09:26) (18 - 20)  SpO2: 96% (31 Oct 2022 09:26) (94% - 99%)    Parameters below as of 31 Oct 2022 09:26  Patient On (Oxygen Delivery Method): nasal cannula     I&O's Summary    30 Oct 2022 07:01  -  31 Oct 2022 07:00  --------------------------------------------------------  IN: 380 mL / OUT: 450 mL / NET: -70 mL      GENERAL: [ ] Cachexia  [ ]Alert  [ ]Oriented  [x]Lethargic  [ ]Unarousable  [X ]Verbal- very minimal  [ ]Non-Verbal  Behavioral:   [ ] Anxiety  [ ] Delirium [ ] Agitation [ ] Other  HEENT:  [x]Normal   [ ]Dry mouth   [ ]ET Tube/Trach  [ ]Oral lesions  PULMONARY:   [x]Clear [ ]Tachypnea  [ ]Audible excessive secretions   [ ]Rhonchi        [ ]Right [ ]Left [ ]Bilateral  [ ]Crackles        [ ]Right [ ]Left [ ]Bilateral  [ ]Wheezing     [ ]Right [ ]Left [ ]Bilateral  [ ]Diminished BS [ ]Right [ ]Left [ ]Bilateral    CARDIOVASCULAR:    [ ]Regular [x]Irregular [ ]Tachy  [ ]Kevan [ ]Murmur [ ]Other  GASTROINTESTINAL:  [x]Soft  [ ]Distended   [x]+BS  [x]Non tender [ ]Tender  [ ]Other [ ]PEG [ ]OGT/ NGT   Last BM: 10/31 AM  GENITOURINARY:  [ ]Normal [ ] Incontinent   [ ]Oliguria/Anuria   [x]Spangler  MUSCULOSKELETAL:   [ ]Normal   [X ]Weakness  [x]Bed/Wheelchair bound [ ]Edema  NEUROLOGIC:   AOx0, opens eyes to voice, follows commands  [ ]No focal deficits  [] Cognitive impairment  [ ] Dysphagia [ ]Dysarthria [ ] Paresis [ ]Other   SKIN: Ecchymosis   [x]Normal  [ ]Rash  [ ]Other  [ ]Pressure ulcer(s)  [ ]y [ ]n  Present on admission      CRITICAL CARE:  [ ] Shock Present  [ ]Septic [ ]Cardiogenic [ ]Neurologic [ ]Hypovolemic  [ ]  Vasopressors [ ]  Inotropes   [ ] Respiratory failure present [ ] Mechanical Ventilation [ ] Non-invasive ventilatory support [ ] High-Flow  [ ] Acute  [ ] Chronic [ ] Hypoxic  [ ] Hypercarbic [ ] Other  [ ] Other organ failure     LABS: Review     RADIOLOGY & ADDITIONAL STUDIES: Review     PROTEIN CALORIE MALNUTRITION: [ ] mild [ ] moderate [ ] severe  [ ] underweight [ ] morbid obesity    https://www.andeal.org/vault/8220/web/files/ONC/Table_Clinical%20Characteristics%20to%20Document%20Malnutrition-White%20JV%20et%20al%576413.pdf    Height (cm): 157.5 (10-19-22 @ 13:15), 157.5 (10-09-22 @ 18:16), 157.5 (09-02-22 @ 13:20)  Weight (kg): 48.4 (10-19-22 @ 23:36), 59 (09-02-22 @ 13:20)  BMI (kg/m2): 19.5 (10-19-22 @ 23:36), 23.8 (10-19-22 @ 13:15), 23.8 (10-09-22 @ 18:16)    [X ] PPSV2 < or = 30% [ ] significant weight loss [ ] poor nutritional intake [ ] anasarca   Artificial Nutrition [ ]     Other REFERRALS:    [ ] Hospice  [ ]Child Life  [ X]Social Work  [ ]Case management [ ]Holistic Therapy [ ] Physical Therapy [ ] Dietary   Goals of Care Document:

## 2022-11-01 DIAGNOSIS — R06.00 DYSPNEA, UNSPECIFIED: ICD-10-CM

## 2022-11-01 DIAGNOSIS — R52 PAIN, UNSPECIFIED: ICD-10-CM

## 2022-11-01 DIAGNOSIS — R45.1 RESTLESSNESS AND AGITATION: ICD-10-CM

## 2022-11-01 DIAGNOSIS — R09.89 OTHER SPECIFIED SYMPTOMS AND SIGNS INVOLVING THE CIRCULATORY AND RESPIRATORY SYSTEMS: ICD-10-CM

## 2022-11-01 LAB — SARS-COV-2 RNA SPEC QL NAA+PROBE: SIGNIFICANT CHANGE UP

## 2022-11-01 PROCEDURE — 99233 SBSQ HOSP IP/OBS HIGH 50: CPT | Mod: FS,GC

## 2022-11-01 RX ORDER — ROBINUL 0.2 MG/ML
0.4 INJECTION INTRAMUSCULAR; INTRAVENOUS EVERY 6 HOURS
Refills: 0 | Status: DISCONTINUED | OUTPATIENT
Start: 2022-11-01 | End: 2022-11-05

## 2022-11-01 RX ADMIN — LIDOCAINE 1 PATCH: 4 CREAM TOPICAL at 08:01

## 2022-11-01 RX ADMIN — HYDROMORPHONE HYDROCHLORIDE 0.5 MILLIGRAM(S): 2 INJECTION INTRAMUSCULAR; INTRAVENOUS; SUBCUTANEOUS at 01:24

## 2022-11-01 RX ADMIN — Medication 0.5 MILLIGRAM(S): at 01:25

## 2022-11-01 RX ADMIN — ROBINUL 0.4 MILLIGRAM(S): 0.2 INJECTION INTRAMUSCULAR; INTRAVENOUS at 11:39

## 2022-11-01 RX ADMIN — LIDOCAINE 1 PATCH: 4 CREAM TOPICAL at 01:40

## 2022-11-01 RX ADMIN — HYDROMORPHONE HYDROCHLORIDE 0.5 MILLIGRAM(S): 2 INJECTION INTRAMUSCULAR; INTRAVENOUS; SUBCUTANEOUS at 03:03

## 2022-11-01 RX ADMIN — LIDOCAINE 1 PATCH: 4 CREAM TOPICAL at 12:53

## 2022-11-01 RX ADMIN — Medication 1 DROP(S): at 05:44

## 2022-11-01 RX ADMIN — PIPERACILLIN AND TAZOBACTAM 25 GRAM(S): 4; .5 INJECTION, POWDER, LYOPHILIZED, FOR SOLUTION INTRAVENOUS at 21:06

## 2022-11-01 RX ADMIN — Medication 0.5 MILLIGRAM(S): at 03:03

## 2022-11-01 RX ADMIN — Medication 1 MILLIGRAM(S): at 09:28

## 2022-11-01 RX ADMIN — HYDROMORPHONE HYDROCHLORIDE 0.5 MILLIGRAM(S): 2 INJECTION INTRAMUSCULAR; INTRAVENOUS; SUBCUTANEOUS at 03:18

## 2022-11-01 RX ADMIN — Medication 1 DROP(S): at 17:09

## 2022-11-01 RX ADMIN — Medication 0.5 MILLIGRAM(S): at 08:18

## 2022-11-01 RX ADMIN — Medication 0.5 MILLIGRAM(S): at 23:12

## 2022-11-01 RX ADMIN — PIPERACILLIN AND TAZOBACTAM 25 GRAM(S): 4; .5 INJECTION, POWDER, LYOPHILIZED, FOR SOLUTION INTRAVENOUS at 14:02

## 2022-11-01 RX ADMIN — Medication 0.5 MILLIGRAM(S): at 11:39

## 2022-11-01 RX ADMIN — PIPERACILLIN AND TAZOBACTAM 25 GRAM(S): 4; .5 INJECTION, POWDER, LYOPHILIZED, FOR SOLUTION INTRAVENOUS at 05:43

## 2022-11-01 RX ADMIN — HYDROMORPHONE HYDROCHLORIDE 0.5 MILLIGRAM(S): 2 INJECTION INTRAMUSCULAR; INTRAVENOUS; SUBCUTANEOUS at 01:39

## 2022-11-01 RX ADMIN — Medication 0.5 MILLIGRAM(S): at 17:08

## 2022-11-01 NOTE — PROGRESS NOTE ADULT - PROBLEM SELECTOR PLAN 8
- likely 2/2 acute illnesses vs delirium vs blood loss anemia  - Pt AOx0, minimally cooperative w/ nursing, unable to take PO medicine  - hold home PO medications for now  - Monitor for constipation, urinary retention, pain, hunger, thirst, etc.  Promote sleep wake cycle and reorientation as indicated.

## 2022-11-01 NOTE — PROGRESS NOTE ADULT - PROBLEM SELECTOR PLAN 2
- Continue with Dilaudid 0.5mg IV q1hr PRN for dyspnea( 1 required within a 24hr period 8am-8am)  - Bowel regimen while on opioids

## 2022-11-01 NOTE — PROGRESS NOTE ADULT - PROBLEM SELECTOR PLAN 4
- The patient required PRN Ativan 0.5mg IV X4 within a 24hr period 8am-8am  - Ativan 0.5mg IV q6hr ATC ordered  - Increased the PRN Ativan from 0.5mg to 1mg IV q1hr PRN  - Monitor for constipation, urinary retention, pain, hunger, thirst, etc.  Promote sleep wake cycle and reorientation as indicated.

## 2022-11-01 NOTE — PROGRESS NOTE ADULT - PROBLEM SELECTOR PLAN 9
- Called and spoke to the patient's nephew Alexey. Updates provided. Educated as to what to expect. Questions answered.  Emotional support provided.   - Disposition planning will be guided by clinical course.

## 2022-11-01 NOTE — PROGRESS NOTE ADULT - SUBJECTIVE AND OBJECTIVE BOX
GAP TEAM PALLIATIVE CARE UNIT PROGRESS NOTE:      [  ] Patient on hospice program.    INDICATION FOR PALLIATIVE CARE UNIT SERVICES/Interval HPI: 89 F w/ PMHx of HTN, presented from [facility] 10/19 for hypoxia and lethargy, found to have fractures in L ribs 4-6, admitted to SICU. Treated for UTI, later found to have presumed aspiration PNA, currently on abx. Stay c/b bloody BMs, desaturations on NRB, swallowing dysfunction w/ failed SS eval. Pt w/ progressive lethargy and confusion, palliative consulted and GOC conversation held w/ HCP nephew Brendon Lopez. Agreed to comfort care measures given patient had expressed wishes for a peaceful and pain free passing in the past. Now on PCU for symptom management and comfort care.     OVERNIGHT EVENTS: Chart reviewed. The patient is seen and examined at the bedside. She required PRN Ativan 0.5mg IV X4 for agitation, PRN Dilaudid 0.5mg IV X1 for dyspnea and X1 for severe pain within a 24hr period 8am-8am.    DNR on chart: Yes  Yes      Allergies    No Known Allergies    Intolerances    MEDICATIONS  (STANDING):  artificial  tears Solution 1 Drop(s) Both EYES two times a day  lidocaine   4% Patch 1 Patch Transdermal every 24 hours  LORazepam   Injectable 0.5 milliGRAM(s) IV Push every 6 hours  piperacillin/tazobactam IVPB.. 3.375 Gram(s) IV Intermittent every 8 hours    MEDICATIONS  (PRN):  acetaminophen  Suppository .. 650 milliGRAM(s) Rectal every 6 hours PRN Temp greater or equal to 38C (100.4F), Mild Pain (1 - 3)  bisacodyl Suppository 10 milliGRAM(s) Rectal daily PRN Constipation  glycopyrrolate Injectable 0.4 milliGRAM(s) IV Push every 6 hours PRN secretions  HYDROmorphone  Injectable 0.5 milliGRAM(s) IV Push every 1 hour PRN dyspnea  HYDROmorphone  Injectable 0.3 milliGRAM(s) IV Push every 1 hour PRN Moderate Pain (4 - 6)  HYDROmorphone  Injectable 0.5 milliGRAM(s) IV Push every 1 hour PRN Severe Pain (7 - 10)  LORazepam   Injectable 1 milliGRAM(s) IV Push every 1 hour PRN Agitation  ondansetron Injectable 4 milliGRAM(s) IV Push every 6 hours PRN Nausea and/or Vomiting    ITEMS UNCHECKED ARE NOT PRESENT    PRESENT SYMPTOMS: [ X]Unable to self-report  [ X]PAINADs [X ]RDOS  Source if other than patient:  [ ]Family   [ X]Team     Pain: [ ] yes [ X] no  QOL impact -   Location -                    Aggravating factors -  Quality -  Radiation -  Timing-  Severity (0-10 scale): A documented score of 8 &10 within a 24hr period 8am-8am. A score off zero during my exam this morning  Minimal acceptable level (0-10 scale):     PAINAD Score: 0  [email or web address] (Ctrl +  left click to view)    Dyspnea:                           [ ]Mild [ ]Moderate [ ]Severe    RDOS: 0  0 to 2  minimal or no respiratory distress   3  mild distress  4 to 6 moderate distress  >7 severe distress  https://[email or web address]/handouts/hen/Respiratory_Distress_Observation_Scale.pdf (Ctrl +  left click to view)     Anxiety:                             [ ]Mild [ ]Moderate [ ]Severe  Fatigue:                             [ ]Mild [ ]Moderate [ ]Severe  Nausea:                             [ ]Mild [ ]Moderate [ ]Severe  Loss of appetite:              [ ]Mild [ ]Moderate [ ]Severe  Constipation:                    [ ]Mild [ ]Moderate [ ]Severe    PCSSQ[Palliative Care Spiritual Screening Question]   Severity (0-10):  Score of 4 or > indicate consideration of Chaplaincy referral.  Chaplaincy Referral: [ ] yes [ ] refused [ ] following [ X] Deferred     Caregiver [place]? : [ ] yes [ ] no [X ] Deferred [ ] Declined             Social work referral [ ] Patient & Family Centered Care Referral [ ]     Anticipatory Grief present?:  [X ] yes [ ] no  [ ] Deferred                  Social work referral [X ] Chaplaincy Referral [ ]    Other Symptoms:  [ ]All other review of systems negative- unable to assess    Palliative Performance Status Version 2:  20%         http://npcrc.org/files/news/palliative_performance_scale_ppsv2.pdf  PHYSICAL EXAM:   Vital Signs Last 24 Hrs  T(C): 36.6 (01 Nov 2022 08:49), Max: 36.6 (01 Nov 2022 08:49)  T(F): 97.9 (01 Nov 2022 08:49), Max: 97.9 (01 Nov 2022 08:49)  HR: 84 (01 Nov 2022 08:49) (84 - 84)  BP: 173/78 (01 Nov 2022 08:49) (173/78 - 173/78)  BP(mean): --  RR: 20 (01 Nov 2022 08:49) (20 - 20)  SpO2: 90% (01 Nov 2022 08:49) (90% - 90%)    Parameters below as of 01 Nov 2022 08:49  Patient On (Oxygen Delivery Method): room air     I&O's Summary    31 Oct 2022 07:01  -  01 Nov 2022 07:00  --------------------------------------------------------  IN: 0 mL / OUT: 425 mL / NET: -425 mL      GENERAL: [ ] Cachexia  [ ]Alert  [ ]Oriented x   [ ]Lethargic  [ ]Unarousable  [ ]Verbal  [ X]Non-Verbal  Behavioral:   [ ] Anxiety  [ ] Delirium [X ] Agitation [ ] Other  HEENT:  [ ]Normal   [ ]Dry mouth   [ ]ET Tube/Trach  [ ]Oral lesions  PULMONARY:   [ ]Clear [ ]Tachypnea  [ X]Audible excessive secretions   [ ]Rhonchi        [ ]Right [ ]Left [ ]Bilateral  [ ]Crackles        [ ]Right [ ]Left [ ]Bilateral  [ ]Wheezing     [ ]Right [ ]Left [ ]Bilateral  [ ]Diminished BS [ ]Right [ ]Left [ ]Bilateral    CARDIOVASCULAR:    [ X]Regular [ ]Irregular [ ]Tachy  [ ]Kevan [ ]Murmur [ ]Other  GASTROINTESTINAL:  [X ]Soft  [ ]Distended   [ X]+BS  [X ]Non tender [ ]Tender  [ ]Other [ ]PEG [ ]OGT/ NGT   Last BM: 10/30/22  GENITOURINARY:  [ ]Normal [X ] Incontinent   [ ]Oliguria/Anuria   [X ]Spangler  MUSCULOSKELETAL:   [ ]Normal   [ ]Weakness  [ ]Bed/Wheelchair bound [ ]Edema  NEUROLOGIC:   [ ]No focal deficits  [ ] Cognitive impairment  [ ] Dysphagia [ ]Dysarthria [ ] Paresis [ ]Other   SKIN: Ecchymossis  [ ]Normal  [ ]Rash  [ X]Other- Incontinence associated dermatitis   [ ]Pressure ulcer(s)  [ ]y [ ]n  Present on admission      CRITICAL CARE:  [ ] Shock Present  [ ]Septic [ ]Cardiogenic [ ]Neurologic [ ]Hypovolemic  [ ]  Vasopressors [ ]  Inotropes   [ ] Respiratory failure present [ ] Mechanical Ventilation [ ] Non-invasive ventilatory support [ ] High-Flow  [ ] Acute  [ ] Chronic [ ] Hypoxic  [ ] Hypercarbic [ ] Other  [ ] Other organ failure     LABS: None new    RADIOLOGY & ADDITIONAL STUDIES: None new    PROTEIN CALORIE MALNUTRITION: [ ] mild [ ] moderate [ ] severe  [ ] underweight [ ] morbid obesity    https://www.andeal.org/vault/2440/web/files/ONC/Table_Clinical%20Characteristics%20to%20Document%20Malnutrition-White%20JV%20et%20al%202012.pdf    Height (cm): 157.5 (10-19-22 @ 13:15), 157.5 (10-09-22 @ 18:16), 157.5 (09-02-22 @ 13:20)  Weight (kg): 48.4 (10-19-22 @ 23:36), 59 (09-02-22 @ 13:20)  BMI (kg/m2): 19.5 (10-19-22 @ 23:36), 23.8 (10-19-22 @ 13:15), 23.8 (10-09-22 @ 18:16)    [ X] PPSV2 < or = 30% [ ] significant weight loss [ ] poor nutritional intake [ ] anasarca   Artificial Nutrition [ ]     Other REFERRALS:    [ ] Hospice  [ ]Child Life  [X ]Social Work  [ ]Case management [ ]Holistic Therapy [ ] Physical Therapy [ ] Dietary   Goals of Care Document:

## 2022-11-01 NOTE — PROGRESS NOTE ADULT - ASSESSMENT
89 F w/ PMHx HTN, admitted for AMS and hypoxia, found to have UTI and aspiration PNA, admitted to PCU for comfort care measures. Pt lethargic on admission, intermittently following commands, unable to take PO medications. Patient is now on the PCU for symptom management and comfort care.

## 2022-11-01 NOTE — PROGRESS NOTE ADULT - PROBLEM SELECTOR PLAN 7
- s/p course of ceftriaxone, pip/tazo  - Ucx previously grew Citrobacter, sensitive to ceftriaxone and pip/tazo  - pt w/o complaints of dysuria

## 2022-11-01 NOTE — PROGRESS NOTE ADULT - PROBLEM SELECTOR PLAN 1
- Patient with audible breath sounds  - Robinul 0.4mg IV q6hr PRN ordered  - Turn and position for postural drainage

## 2022-11-01 NOTE — PROGRESS NOTE ADULT - PROBLEM SELECTOR PLAN 5
- PPSV- 20%. The patient requires nursing assistance with all ADLs  - Supportive care  - Turn and position   - Continue with good skin care

## 2022-11-01 NOTE — PROGRESS NOTE ADULT - PROBLEM SELECTOR PLAN 3
- Continue with Dilaudid 0.3mg IV q1hr PRN for moderate pain ( 0 required within a 24hr period 8am-8am)  - Continue with Dilaudid 0.5mg IV q1hr PRN for severe pain ( 1 required within a 24hr period 8am-8am)  - Bowel regimen while on opioids

## 2022-11-02 PROCEDURE — 99231 SBSQ HOSP IP/OBS SF/LOW 25: CPT | Mod: GC

## 2022-11-02 RX ADMIN — Medication 1 DROP(S): at 06:45

## 2022-11-02 RX ADMIN — LIDOCAINE 1 PATCH: 4 CREAM TOPICAL at 08:06

## 2022-11-02 RX ADMIN — LIDOCAINE 1 PATCH: 4 CREAM TOPICAL at 00:07

## 2022-11-02 RX ADMIN — Medication 0.5 MILLIGRAM(S): at 18:01

## 2022-11-02 RX ADMIN — PIPERACILLIN AND TAZOBACTAM 25 GRAM(S): 4; .5 INJECTION, POWDER, LYOPHILIZED, FOR SOLUTION INTRAVENOUS at 13:07

## 2022-11-02 RX ADMIN — Medication 0.5 MILLIGRAM(S): at 13:07

## 2022-11-02 RX ADMIN — PIPERACILLIN AND TAZOBACTAM 25 GRAM(S): 4; .5 INJECTION, POWDER, LYOPHILIZED, FOR SOLUTION INTRAVENOUS at 20:34

## 2022-11-02 RX ADMIN — HYDROMORPHONE HYDROCHLORIDE 0.5 MILLIGRAM(S): 2 INJECTION INTRAMUSCULAR; INTRAVENOUS; SUBCUTANEOUS at 20:34

## 2022-11-02 RX ADMIN — Medication 0.5 MILLIGRAM(S): at 23:34

## 2022-11-02 RX ADMIN — LIDOCAINE 1 PATCH: 4 CREAM TOPICAL at 12:00

## 2022-11-02 RX ADMIN — Medication 0.5 MILLIGRAM(S): at 05:34

## 2022-11-02 RX ADMIN — PIPERACILLIN AND TAZOBACTAM 25 GRAM(S): 4; .5 INJECTION, POWDER, LYOPHILIZED, FOR SOLUTION INTRAVENOUS at 05:33

## 2022-11-02 RX ADMIN — Medication 1 DROP(S): at 18:01

## 2022-11-02 RX ADMIN — Medication 1 MILLIGRAM(S): at 19:50

## 2022-11-02 RX ADMIN — ROBINUL 0.4 MILLIGRAM(S): 0.2 INJECTION INTRAMUSCULAR; INTRAVENOUS at 13:06

## 2022-11-02 NOTE — PROGRESS NOTE ADULT - PROBLEM SELECTOR PLAN 1
- Patient with audible breath sounds  - Robinul 0.4mg IV q6hr PRN ordered (1 required within a 24 hr period 8am - 8am)  - Turn and position for postural drainage - Patient with audible breath sounds  - Robinul 0.4mg IV q6hr PRN ordered (1 required within a 24 hr period 8am -8am)   - Turn and position for postural drainage

## 2022-11-02 NOTE — PROGRESS NOTE ADULT - PROBLEM SELECTOR PLAN 3
- Continue with Dilaudid 0.3mg IV q1hr PRN for moderate pain ( 0 required within a 24hr period 8am-8am)  - Continue with Dilaudid 0.5mg IV q1hr PRN for severe pain ( 0 required within a 24hr period 8am-8am)  - Bowel regimen while on opioids

## 2022-11-02 NOTE — PROGRESS NOTE ADULT - SUBJECTIVE AND OBJECTIVE BOX
GAP TEAM PALLIATIVE CARE UNIT PROGRESS NOTE:      [  ] Patient on hospice program.    INDICATION FOR PALLIATIVE CARE UNIT SERVICES/Interval HPI: 89 F w/ PMHx of HTN, presented from [facility] 10/19 for hypoxia and lethargy, found to have fractures in L ribs 4-6, admitted to SICU. Treated for UTI, later found to have presumed aspiration PNA, currently on abx. Stay c/b bloody BMs, desaturations on NRB, swallowing dysfunction w/ failed SS eval. Pt w/ progressive lethargy and confusion, palliative consulted and GOC conversation held w/ HCP nephew Brendon Lopez. Agreed to comfort care measures given patient had expressed wishes for a peaceful and pain free passing in the past. Now on PCU for symptom management and comfort care.     OVERNIGHT EVENTS: No acute overnight events. Patient received 1.5 mg of PRN Ativan in last 24 hrs 8 AM - 8 AM in addition to her standing dose of Ativan 0.5 mg q6h. Also received glycopyrrolate 0.4 mg PRN secretions within last 24 hrs.     DNR on chart: Yes  Yes      Allergies    No Known Allergies    Intolerances    MEDICATIONS  (STANDING):  artificial  tears Solution 1 Drop(s) Both EYES two times a day  lidocaine   4% Patch 1 Patch Transdermal every 24 hours  LORazepam   Injectable 0.5 milliGRAM(s) IV Push every 6 hours  piperacillin/tazobactam IVPB.. 3.375 Gram(s) IV Intermittent every 8 hours    MEDICATIONS  (PRN):  acetaminophen  Suppository .. 650 milliGRAM(s) Rectal every 6 hours PRN Temp greater or equal to 38C (100.4F), Mild Pain (1 - 3)  bisacodyl Suppository 10 milliGRAM(s) Rectal daily PRN Constipation  glycopyrrolate Injectable 0.4 milliGRAM(s) IV Push every 6 hours PRN secretions  HYDROmorphone  Injectable 0.5 milliGRAM(s) IV Push every 1 hour PRN dyspnea  HYDROmorphone  Injectable 0.3 milliGRAM(s) IV Push every 1 hour PRN Moderate Pain (4 - 6)  HYDROmorphone  Injectable 0.5 milliGRAM(s) IV Push every 1 hour PRN Severe Pain (7 - 10)  LORazepam   Injectable 1 milliGRAM(s) IV Push every 1 hour PRN Agitation  ondansetron Injectable 4 milliGRAM(s) IV Push every 6 hours PRN Nausea and/or Vomiting    ITEMS UNCHECKED ARE NOT PRESENT    PRESENT SYMPTOMS: [x ]Unable to self-report  [x]PAINADs [x ]RDOS  Source if other than patient:  [ ]Family   [x ]Team     Pain: [ ] yes [x ] no  QOL impact -   Location -                    Aggravating factors -  Quality -Te  Radiation -  Timing-  Severity (0-10 scale):G  Minimal acceptable level (0-10 scale):     PAINAD Score: 0  [email or web address]/[email or web address] (Ctrl +  left click to view)    Dyspnea:                           [ ]Mild [ ]Moderate [ ]Severe    RDOS: 0  0 to 2  minimal or no respiratory distress   3  mild distress  4 to 6 moderate distress  >7 severe distress  [email or web address]://[email or web address]/handouts/hen/Respiratory_Distress_Observation_Scale.pdf (Ctrl +  left click to view)     Anxiety:                             [ ]Mild [ ]Moderate [ ]Severe  Fatigue:                             [ ]Mild [ ]Moderate [ ]Severe  Nausea:                             [ ]Mild [ ]Moderate [ ]Severe  Loss of appetite:              [ ]Mild [ ]Moderate [ ]Severe  Constipation:                    [ ]Mild [ ]Moderate [ ]Severe    PCSSQ[Palliative Care Spiritual Screening Question]   Severity (0-10):  Score of 4 or > indicate consideration of Chaplaincy referral.  Chaplaincy Referral: [ ] yes [ ] refused [ ] following [x ] Deferred     Caregiver [place]? : [ ] yes [x ] no [ ] Deferred [ ] Declined             Social work referral [ ] Patient & Family Centered Care Referral [ ]     Anticipatory Grief present?:  [x ] yes [ ] no  [ ] Deferred                  Social work referral [x ] Chaplaincy Referral [ ]    		  Other Symptoms:  [ ]All other review of systems negative     Palliative Performance Status Version 2:         20%         http://npcrc.org/files/news/palliative_performance_scale_ppsv2.pdf  PHYSICAL EXAM:   Vital Signs Last 24 Hrs  T(C): 36.7 (02 Nov 2022 08:46), Max: 36.7 (02 Nov 2022 08:46)  T(F): 98 (02 Nov 2022 08:46), Max: 98 (02 Nov 2022 08:46)  HR: 81 (02 Nov 2022 08:46) (81 - 81)  BP: 172/95 (02 Nov 2022 08:46) (172/95 - 172/95)  BP(mean): --  RR: 18 (02 Nov 2022 08:46) (18 - 18)  SpO2: 94% (02 Nov 2022 08:46) (94% - 94%)    Parameters below as of 02 Nov 2022 08:46  Patient On (Oxygen Delivery Method): room air     I&O's Summary    01 Nov 2022 07:01  -  02 Nov 2022 07:00  --------------------------------------------------------  IN: 0 mL / OUT: 200 mL / NET: -200 mL      GENERAL: [ ] Cachexia  [ ]Alert  [ ]Oriented  [ ]Lethargic  [x ]Unarousable  [ ]Verbal  [ ]Non-Verbal  Behavioral:   [ ] Anxiety  [ ] Delirium [ ] Agitation [ ] Other  HEENT:  [x ]Normal   [ ]Dry mouth   [ ]ET Tube/Trach  [ ]Oral lesions  PULMONARY:   [x ]Clear [ ]Tachypnea  [ ]Audible excessive secretions   [ ]Rhonchi        [ ]Right [ ]Left [ ]Bilateral  [ ]Crackles        [ ]Right [ ]Left [ ]Bilateral  [ ]Wheezing     [ ]Right [ ]Left [ ]Bilateral  [ ]Diminished BS [ ]Right [ ]Left [ ]Bilateral    CARDIOVASCULAR:    [ ]Regular [x ]Irregular [ ]Tachy  [ ]Kevan [ ]Murmur [ ]Other  GASTROINTESTINAL:  [x ]Soft  [ ]Distended   [x ]+BS  [x ]Non tender [ ]Tender  [ ]Other [ ]PEG [ ]OGT/ NGT   Last BM:    GENITOURINARY:  [ ]Normal [ ] Incontinent   [ ]Oliguria/Anuria   [ x]Spangler  MUSCULOSKELETAL:   [ ]Normal   [ ]Weakness  [x ]Bed/Wheelchair bound [ ]Edema  NEUROLOGIC:   [ ]No focal deficits  [ ] Cognitive impairment  [ ] Dysphagia [ ]Dysarthria [ ] Paresis [ ]Other   SKIN:   [x ]Normal  [ ]Rash  [ ]Other  [ ]Pressure ulcer(s)  [ ]y [ ]n  Present on admission      CRITICAL CARE:  [ ] Shock Present  [ ]Septic [ ]Cardiogenic [ ]Neurologic [ ]Hypovolemic  [ ]  Vasopressors [ ]  Inotropes   [ ] Respiratory failure present [ ] Mechanical Ventilation [ ] Non-invasive ventilatory support [ ] High-Flow  [ ] Acute  [ ] Chronic [ ] Hypoxic  [ ] Hypercarbic [ ] Other  [ ] Other organ failure     LABS:            RADIOLOGY & ADDITIONAL STUDIES:    PROTEIN CALORIE MALNUTRITION: [ ] mild [ ] moderate [ ] severe  [ ] underweight [ ] morbid obesity    https://www.andeal.org/vault/9120/web/files/ONC/Table_Clinical%20Characteristics%20to%20Document%20Malnutrition-White%20JV%20et%20al%202012.pdf    Height (cm): 157.5 (10-19-22 @ 13:15), 157.5 (10-09-22 @ 18:16), 157.5 (09-02-22 @ 13:20)  Weight (kg): 48.4 (10-19-22 @ 23:36), 59 (09-02-22 @ 13:20)  BMI (kg/m2): 19.5 (10-19-22 @ 23:36), 23.8 (10-19-22 @ 13:15), 23.8 (10-09-22 @ 18:16)    [ ] PPSV2 < or = 30% [ ] significant weight loss [ ] poor nutritional intake [ ] anasarca   Artificial Nutrition [ ]     Other REFERRALS:    [ ] Hospice  [ ]Child Life  [ ]Social Work  [ ]Case management [ ]Holistic Therapy [ ] Physical Therapy [ ] Dietary   Goals of Care Document:  GAP TEAM PALLIATIVE CARE UNIT PROGRESS NOTE:      [  ] Patient on hospice program.    INDICATION FOR PALLIATIVE CARE UNIT SERVICES/Interval HPI: 89 F w/ PMHx of HTN, presented from [facility] 10/19 for hypoxia and lethargy, found to have fractures in L ribs 4-6, admitted to SICU. Treated for UTI, later found to have presumed aspiration PNA, currently on abx. Stay c/b bloody BMs, desaturations on NRB, swallowing dysfunction w/ failed SS eval. Pt w/ progressive lethargy and confusion, palliative consulted and GOC conversation held w/ HCP nephew Brendon Jessica. Agreed to comfort care measures given patient had expressed wishes for a peaceful and pain free passing in the past. Now on PCU for symptom management and comfort care.     OVERNIGHT EVENTS: Chart reviewed. The patient is seen and examined at the bedside. Patient received 1.5 mg of PRN Ativan in last 24 hrs 8 AM - 8 AM in addition to her standing dose of Ativan 0.5 mg q6h. Also received glycopyrrolate 0.4 mg PRN secretions within last 24 hrs.     DNR on chart: Yes  Yes      Allergies    No Known Allergies    Intolerances    MEDICATIONS  (STANDING):  artificial  tears Solution 1 Drop(s) Both EYES two times a day  lidocaine   4% Patch 1 Patch Transdermal every 24 hours  LORazepam   Injectable 0.5 milliGRAM(s) IV Push every 6 hours  piperacillin/tazobactam IVPB.. 3.375 Gram(s) IV Intermittent every 8 hours    MEDICATIONS  (PRN):  acetaminophen  Suppository .. 650 milliGRAM(s) Rectal every 6 hours PRN Temp greater or equal to 38C (100.4F), Mild Pain (1 - 3)  bisacodyl Suppository 10 milliGRAM(s) Rectal daily PRN Constipation  glycopyrrolate Injectable 0.4 milliGRAM(s) IV Push every 6 hours PRN secretions  HYDROmorphone  Injectable 0.5 milliGRAM(s) IV Push every 1 hour PRN dyspnea  HYDROmorphone  Injectable 0.3 milliGRAM(s) IV Push every 1 hour PRN Moderate Pain (4 - 6)  HYDROmorphone  Injectable 0.5 milliGRAM(s) IV Push every 1 hour PRN Severe Pain (7 - 10)  LORazepam   Injectable 1 milliGRAM(s) IV Push every 1 hour PRN Agitation  ondansetron Injectable 4 milliGRAM(s) IV Push every 6 hours PRN Nausea and/or Vomiting    ITEMS UNCHECKED ARE NOT PRESENT    PRESENT SYMPTOMS: [x ]Unable to self-report  [x]PAINADs [x ]RDOS  Source if other than patient:  [ ]Family   [x ]Team     Pain: [ ] yes [x ] no  QOL impact -   Location -                    Aggravating factors -  Quality -  Radiation -  Timing-  Severity (0-10 scale):  Minimal acceptable level (0-10 scale):     PAINAD Score: 0  http://[email or web address]/cog/painad.pdf (Ctrl +  left click to view)    Dyspnea:                           [ ]Mild [ ]Moderate [ ]Severe    RDOS: 0  0 to 2  minimal or no respiratory distress   3  mild distress  4 to 6 moderate distress  >7 severe distress  https://[email or web address]/handouts/hen/Respiratory_Distress_Observation_Scale.pdf (Ctrl +  left click to view)     Anxiety:                             [ ]Mild [ ]Moderate [ ]Severe  Fatigue:                             [ ]Mild [ ]Moderate [ ]Severe  Nausea:                             [ ]Mild [ ]Moderate [ ]Severe  Loss of appetite:              [ ]Mild [ ]Moderate [ ]Severe  Constipation:                    [ ]Mild [ ]Moderate [ ]Severe    PCSSQ[Palliative Care Spiritual Screening Question]   Severity (0-10):  Score of 4 or > indicate consideration of Chaplaincy referral.  Chaplaincy Referral: [ ] yes [ ] refused [ x] following [ ] Deferred     Caregiver [place]? : [ ] yes [x ] no [ ] Deferred [ ] Declined             Social work referral [ ] Patient & Family Centered Care Referral [ ]     Anticipatory Grief present?:  [x ] yes [ ] no  [ ] Deferred                  Social work referral [x ] Chaplaincy Referral [ ]    		  Other Symptoms:  [ ]All other review of systems negative- unable to assess due to poor mentation     Palliative Performance Status Version 2:         20%         http://npcrc.org/files/news/palliative_performance_scale_ppsv2.pdf  PHYSICAL EXAM:   Vital Signs Last 24 Hrs  T(C): 36.7 (02 Nov 2022 08:46), Max: 36.7 (02 Nov 2022 08:46)  T(F): 98 (02 Nov 2022 08:46), Max: 98 (02 Nov 2022 08:46)  HR: 81 (02 Nov 2022 08:46) (81 - 81)  BP: 172/95 (02 Nov 2022 08:46) (172/95 - 172/95)  BP(mean): --  RR: 18 (02 Nov 2022 08:46) (18 - 18)  SpO2: 94% (02 Nov 2022 08:46) (94% - 94%)    Parameters below as of 02 Nov 2022 08:46  Patient On (Oxygen Delivery Method): room air     I&O's Summary    01 Nov 2022 07:01  -  02 Nov 2022 07:00  --------------------------------------------------------  IN: 0 mL / OUT: 200 mL / NET: -200 mL      GENERAL: [ ] Cachexia  [ ]Alert  [ ]Oriented  [ ]Lethargic  [x ]Unarousable  [ ]Verbal  [ ]Non-Verbal  Behavioral:   [ ] Anxiety  [ ] Delirium [ ] Agitation [ ] Other  HEENT:  [x ]Normal   [ ]Dry mouth   [ ]ET Tube/Trach  [ ]Oral lesions  PULMONARY:   [x ]Clear [ ]Tachypnea  [ ]Audible excessive secretions   [ ]Rhonchi        [ ]Right [ ]Left [ ]Bilateral  [ ]Crackles        [ ]Right [ ]Left [ ]Bilateral  [ ]Wheezing     [ ]Right [ ]Left [ ]Bilateral  [ ]Diminished BS [ ]Right [ ]Left [ ]Bilateral    CARDIOVASCULAR:    [ ]Regular [x ]Irregular [ ]Tachy  [ ]Kevan [ ]Murmur [ ]Other  GASTROINTESTINAL:  [x ]Soft  [ ]Distended   [x ]+BS  [x ]Non tender [ ]Tender  [ ]Other [ ]PEG [ ]OGT/ NGT   Last BM:    GENITOURINARY:  [ ]Normal [ ] Incontinent   [ ]Oliguria/Anuria   [ x]Spangler  MUSCULOSKELETAL:   [ ]Normal   [ ]Weakness  [x ]Bed/Wheelchair bound [ ]Edema  NEUROLOGIC:   [ ]No focal deficits  [ ] Cognitive impairment  [ ] Dysphagia [ ]Dysarthria [ ] Paresis [ ]Other   SKIN:   [x ]Normal  [ ]Rash  [ ]Other  [ ]Pressure ulcer(s)  [ ]y [ ]n  Present on admission      CRITICAL CARE:  [ ] Shock Present  [ ]Septic [ ]Cardiogenic [ ]Neurologic [ ]Hypovolemic  [ ]  Vasopressors [ ]  Inotropes   [ ] Respiratory failure present [ ] Mechanical Ventilation [ ] Non-invasive ventilatory support [ ] High-Flow  [ ] Acute  [ ] Chronic [ ] Hypoxic  [ ] Hypercarbic [ ] Other  [ ] Other organ failure     LABS: None new.    RADIOLOGY & ADDITIONAL STUDIES: None new.    PROTEIN CALORIE MALNUTRITION: [ ] mild [ ] moderate [ ] severe  [ ] underweight [ ] morbid obesity    https://www.andeal.org/vault/2440/web/files/ONC/Table_Clinical%20Characteristics%20to%20Document%20Malnutrition-White%20JV%20et%20al%202012.pdf    Height (cm): 157.5 (10-19-22 @ 13:15), 157.5 (10-09-22 @ 18:16), 157.5 (09-02-22 @ 13:20)  Weight (kg): 48.4 (10-19-22 @ 23:36), 59 (09-02-22 @ 13:20)  BMI (kg/m2): 19.5 (10-19-22 @ 23:36), 23.8 (10-19-22 @ 13:15), 23.8 (10-09-22 @ 18:16)    [ X] PPSV2 < or = 30% [ ] significant weight loss [ ] poor nutritional intake [ ] anasarca   Artificial Nutrition [ ]     Other REFERRALS:    [ ] Hospice  [ ]Child Life  [ X]Social Work  [ ]Case management [ ]Holistic Therapy [ ] Physical Therapy [ ] Dietary   Goals of Care Document:

## 2022-11-02 NOTE — PROGRESS NOTE ADULT - PROBLEM SELECTOR PLAN 9
- Called and spoke to the patient's nephew Alexey. Updates provided. Educated as to what to expect. Questions answered.  Emotional support provided.   - Disposition planning will be guided by clinical course. - Called and spoke to the patient's nephew Alexey. Updates provided. Questions answered.  Emotional support provided.   - Disposition planning will be guided by clinical course.

## 2022-11-02 NOTE — PROGRESS NOTE ADULT - PROBLEM SELECTOR PLAN 2
- Continue with Dilaudid 0.5mg IV q1hr PRN for dyspnea( 0 required within a 24hr period 8am-8am)  - Bowel regimen while on opioids

## 2022-11-02 NOTE — PROGRESS NOTE ADULT - PROBLEM SELECTOR PLAN 4
lv 12/23/16    F/u  5/2/18  omeprazole (PRILOSEC) 20 MG CR capsule  refused per rf notation         aspirin 81 MG tablet  Last Written Prescription Date:  2/23/18  Last Fill Quantity: 30,   # refills: 0       - The patient required total of PRN Ativan 1 mg IV x1 and Ativan 0.5 mg IV x1 within a 24hr period 8am-8am  - Ativan 0.5mg IV q6hr ATC ordered  - c/w PRN Ativan from 0.5mg to 1mg IV q1hr PRN  - Monitor for constipation, urinary retention, pain, hunger, thirst, etc.  Promote sleep wake cycle and reorientation as indicated. - Continue with Ativan 0.5mg IV q6hr ATC   - Continue with PRN Ativan 1mg IV q1hr PRN  (1 required within a 24 hr period 8am -8am)   - Monitor for constipation, urinary retention, pain, hunger, thirst, etc.  Promote sleep wake cycle and reorientation as indicated.

## 2022-11-02 NOTE — PROGRESS NOTE ADULT - ATTENDING COMMENTS
89F with HTN, presented from [facility] 10/19 for hypoxia and lethargy, found to have fractures in L ribs 4-6, admitted to SICU. Treated for UTI, later found to have presumed aspiration PNA, currently on abx. Stay c/b bloody BMs, desaturations on NRB, swallowing dysfunction w/ failed SS eval. Pt w/ progressive lethargy and confusion, palliative consulted and GOC conversation held w/ HCP nephew Brendon Lopez. Agreed to comfort care measures given patient had expressed wishes for a peaceful and pain free passing in the past. Now on PCU for symptom management and comfort care.     Pt appears comfortable this morning, lethargic and minimally arousable by voice. Continue symptom directed care in the PCU, dispo planning TBD pending clinical trajectory. Medical updates provided to nephew today.

## 2022-11-03 PROCEDURE — 99233 SBSQ HOSP IP/OBS HIGH 50: CPT | Mod: GC

## 2022-11-03 RX ADMIN — LIDOCAINE 1 PATCH: 4 CREAM TOPICAL at 08:01

## 2022-11-03 RX ADMIN — LIDOCAINE 1 PATCH: 4 CREAM TOPICAL at 00:06

## 2022-11-03 RX ADMIN — LIDOCAINE 1 PATCH: 4 CREAM TOPICAL at 23:05

## 2022-11-03 RX ADMIN — Medication 0.5 MILLIGRAM(S): at 12:22

## 2022-11-03 RX ADMIN — Medication 1 MILLIGRAM(S): at 08:07

## 2022-11-03 RX ADMIN — PIPERACILLIN AND TAZOBACTAM 25 GRAM(S): 4; .5 INJECTION, POWDER, LYOPHILIZED, FOR SOLUTION INTRAVENOUS at 21:43

## 2022-11-03 RX ADMIN — Medication 0.5 MILLIGRAM(S): at 05:12

## 2022-11-03 RX ADMIN — PIPERACILLIN AND TAZOBACTAM 25 GRAM(S): 4; .5 INJECTION, POWDER, LYOPHILIZED, FOR SOLUTION INTRAVENOUS at 05:12

## 2022-11-03 RX ADMIN — LIDOCAINE 1 PATCH: 4 CREAM TOPICAL at 12:00

## 2022-11-03 RX ADMIN — Medication 0.5 MILLIGRAM(S): at 18:18

## 2022-11-03 RX ADMIN — Medication 0.5 MILLIGRAM(S): at 23:04

## 2022-11-03 RX ADMIN — Medication 1 DROP(S): at 05:11

## 2022-11-03 RX ADMIN — PIPERACILLIN AND TAZOBACTAM 25 GRAM(S): 4; .5 INJECTION, POWDER, LYOPHILIZED, FOR SOLUTION INTRAVENOUS at 12:21

## 2022-11-03 RX ADMIN — Medication 1 DROP(S): at 18:19

## 2022-11-03 RX ADMIN — Medication 1 MILLIGRAM(S): at 10:53

## 2022-11-03 NOTE — PROGRESS NOTE ADULT - PROBLEM SELECTOR PLAN 1
- Patient with audible breath sounds  - Robinul 0.4mg IV q6hr PRN ordered (1 required within a 24 hr period 8am -8am)   - Turn and position for postural drainage

## 2022-11-03 NOTE — PROGRESS NOTE ADULT - ATTENDING COMMENTS
89F with HTN, presented from [facility] 10/19 for hypoxia and lethargy, found to have fractures in L ribs 4-6, admitted to SICU. Treated for UTI, later found to have presumed aspiration PNA, currently on abx. Stay c/b bloody BMs, desaturations on NRB, swallowing dysfunction w/ failed SS eval. Pt w/ progressive lethargy and confusion, palliative consulted and GOC conversation held w/ HCP nephew Brendon Lopez. Agreed to comfort care measures given patient had expressed wishes for a peaceful and pain free passing in the past. Now on PCU for symptom management and comfort care.     Team spoke with pt's nephew and provided medical updates, explored ways to alleviate her episodes of distress by adding music to her room. Nephew open to transfer to inpatient hospice unit as next step given her hemodynamic stability and continued need for IV meds for symptom management. [facility] is being explored with assistance by PCU SW.

## 2022-11-03 NOTE — PROGRESS NOTE ADULT - PROBLEM SELECTOR PLAN 8
- Called and spoke to the patient's nephew Alexey. Updates provided. Questions answered.  Emotional support provided.   - Disposition planning will be guided by clinical course. - Called and spoke to the patient's nephew Alexey. Updates provided. Questions answered.  Emotional support provided. Discussed that patient is currently stable in her dying process, and suggested beginning process for inpatient hospice referral. Alexey was amenable to all options.  - Disposition to inpatient hospice - The team called and spoke to the patient's nephew Alexey. Updates provided. Questions answered.  Emotional support provided. Discussed that patient is currently stable in her dying process, and suggested beginning process for inpatient hospice referral. Alexey was amenable to all options. Alexey is interested in [facility] ( [facility]).  to send referral today.

## 2022-11-03 NOTE — PROGRESS NOTE ADULT - PROBLEM SELECTOR PLAN 4
- Continue with Ativan 0.5mg IV q6hr ATC   - Continue with PRN Ativan 1mg IV q1hr PRN  (1 required within a 24 hr period 8am -8am)   - Monitor for constipation, urinary retention, pain, hunger, thirst, etc.  Promote sleep wake cycle and reorientation as indicated.

## 2022-11-03 NOTE — PROGRESS NOTE ADULT - SUBJECTIVE AND OBJECTIVE BOX
GAP TEAM PALLIATIVE CARE UNIT PROGRESS NOTE:      [  ] Patient on hospice program.    INDICATION FOR PALLIATIVE CARE UNIT SERVICES/Interval HPI: 89 F w/ PMHx of HTN, presented from [facility] 10/19 for hypoxia and lethargy, found to have fractures in L ribs 4-6, admitted to SICU. Treated for UTI, later found to have presumed aspiration PNA, currently on abx. Stay c/b bloody BMs, desaturations on NRB, swallowing dysfunction w/ failed SS eval. Pt w/ progressive lethargy and confusion, palliative consulted and GOC conversation held w/ HCP nephew Brendon Lopez. Agreed to comfort care measures given patient had expressed wishes for a peaceful and pain free passing in the past. Now on PCU for symptom management and comfort care.     OVERNIGHT EVENTS: Patient seen at bedside, chart reviewed. Over 24 hr period from 8 AM to 8 AM, patient received robinul 0.4 mg x1, dialudid 0.5 mg x1 for dyspnea, and ativan 1 mg x1 PRN.     DNR on chart: Yes  Yes      Allergies    No Known Allergies    Intolerances    MEDICATIONS  (STANDING):  artificial  tears Solution 1 Drop(s) Both EYES two times a day  lidocaine   4% Patch 1 Patch Transdermal every 24 hours  LORazepam   Injectable 0.5 milliGRAM(s) IV Push every 6 hours  piperacillin/tazobactam IVPB.. 3.375 Gram(s) IV Intermittent every 8 hours    MEDICATIONS  (PRN):  acetaminophen  Suppository .. 650 milliGRAM(s) Rectal every 6 hours PRN Temp greater or equal to 38C (100.4F), Mild Pain (1 - 3)  bisacodyl Suppository 10 milliGRAM(s) Rectal daily PRN Constipation  glycopyrrolate Injectable 0.4 milliGRAM(s) IV Push every 6 hours PRN secretions  HYDROmorphone  Injectable 0.5 milliGRAM(s) IV Push every 1 hour PRN dyspnea  HYDROmorphone  Injectable 0.5 milliGRAM(s) IV Push every 1 hour PRN Severe Pain (7 - 10)  HYDROmorphone  Injectable 0.3 milliGRAM(s) IV Push every 1 hour PRN Moderate Pain (4 - 6)  LORazepam   Injectable 1 milliGRAM(s) IV Push every 1 hour PRN Agitation  ondansetron Injectable 4 milliGRAM(s) IV Push every 6 hours PRN Nausea and/or Vomiting    ITEMS UNCHECKED ARE NOT PRESENT    PRESENT SYMPTOMS: [x ]Unable to self-report  [x ]PAINADs [x ]RDOS  Source if other than patient:  [x ]Family   []Team     Pain: [ ] yes [x ] no  QOL impact -   Location -                    Aggravating factors -  Quality -Te  Radiation -  Timing-  Severity (0-10 scale):G  Minimal acceptable level (0-10 scale):     PAINAD Score:  http://[email or web address]/cog/painad.pdf (Ctrl +  left click to view)    Dyspnea:                           [ ]Mild [ ]Moderate [ ]Severe    RDOS: 0  0 to 2  minimal or no respiratory distress   3  mild distress  4 to 6 moderate distress  >7 severe distress  https://homecareinformation.net/handouts/hen/Respiratory_Distress_Observation_Scale.pdf (Ctrl +  left click to view)     Anxiety:                             [ ]Mild [ ]Moderate [ ]Severe  Fatigue:                             [ ]Mild [ ]Moderate [ ]Severe  Nausea:                             [ ]Mild [ ]Moderate [ ]Severe  Loss of appetite:              [ ]Mild [ ]Moderate [ ]Severe  Constipation:                    [ ]Mild [ ]Moderate [ ]Severe    PCSSQ[Palliative Care Spiritual Screening Question]   Severity (0-10):  Score of 4 or > indicate consideration of Chaplaincy referral.  Chaplaincy Referral: [ ] yes [ ] refused [x ] following [ ] Deferred     Caregiver Dwarf? : [ ] yes [x ] no [ ] Deferred [ ] Declined             Social work referral [ ] Patient & Family Centered Care Referral [ ]     Anticipatory Grief present?:  [x ] yes [ ] no  [ ] Deferred                  Social work referral [x ] Chaplaincy Referral [ ]    		  Other Symptoms:  [ ]All other review of systems negative     Palliative Performance Status Version 2:         20%         http://npcrc.org/files/news/palliative_performance_scale_ppsv2.pdf  PHYSICAL EXAM:   Vital Signs Last 24 Hrs  T(C): --  T(F): --  HR: --  BP: --  BP(mean): --  RR: --  SpO2: --     I&O's Summary    02 Nov 2022 07:01  -  03 Nov 2022 07:00  --------------------------------------------------------  IN: 0 mL / OUT: 350 mL / NET: -350 mL      GENERAL: [ ] Cachexia  [ ]Alert  [ ]Oriented   [x ]Lethargic  [ ]Unarousable  [ ]Verbal  [ ]Non-Verbal  Behavioral:   [ ] Anxiety  [ ] Delirium [ ] Agitation [ ] Other  HEENT:  [ ]Normal   [ ]Dry mouth   [ ]ET Tube/Trach  [ ]Oral lesions   PULMONARY:   [x ]Clear [ ]Tachypnea  [ ]Audible excessive secretions   [ ]Rhonchi        [ ]Right [ ]Left [ ]Bilateral  [ ]Crackles        [ ]Right [ ]Left [ ]Bilateral  [ ]Wheezing     [ ]Right [ ]Left [ ]Bilateral  [ ]Diminished BS [ ]Right [ ]Left [ ]Bilateral    CARDIOVASCULAR:    [ ]Regular [x ]Irregular [ ]Tachy  [ ]Kevan [ ]Murmur [ ]Other  GASTROINTESTINAL:  [x ]Soft  [ ]Distended   [ ]+BS  [x ]Non tender [ ]Tender  [ ]Other [ ]PEG [ ]OGT/ NGT   Last BM:    GENITOURINARY:  [ ]Normal [ ] Incontinent   [ ]Oliguria/Anuria   [x ]Spangler  MUSCULOSKELETAL:   [ ]Normal   [ ]Weakness  [x ]Bed/Wheelchair bound [ ]Edema  NEUROLOGIC:   [ ]No focal deficits  [ ] Cognitive impairment  [ ] Dysphagia [ ]Dysarthria [ ] Paresis [ ]Other   SKIN:   [x ]Normal  [ ]Rash  [ ]Other  [ ]Pressure ulcer(s)  [ ]y [ ]n  Present on admission      CRITICAL CARE:  [ ] Shock Present  [ ]Septic [ ]Cardiogenic [ ]Neurologic [ ]Hypovolemic  [ ]  Vasopressors [ ]  Inotropes   [ ] Respiratory failure present [ ] Mechanical Ventilation [ ] Non-invasive ventilatory support [ ] High-Flow  [ ] Acute  [ ] Chronic [ ] Hypoxic  [ ] Hypercarbic [ ] Other  [ ] Other organ failure     LABS:            RADIOLOGY & ADDITIONAL STUDIES:    PROTEIN CALORIE MALNUTRITION: [ ] mild [ ] moderate [ ] severe  [ ] underweight [ ] morbid obesity    https://www.andeal.org/vault/2036/web/files/ONC/Table_Clinical%20Characteristics%20to%20Document%20Malnutrition-White%20JV%20et%20al%120224.pdf    Height (cm): 157.5 (10-19-22 @ 13:15), 157.5 (10-09-22 @ 18:16), 157.5 (09-02-22 @ 13:20)  Weight (kg): 48.4 (10-19-22 @ 23:36), 59 (09-02-22 @ 13:20)  BMI (kg/m2): 19.5 (10-19-22 @ 23:36), 23.8 (10-19-22 @ 13:15), 23.8 (10-09-22 @ 18:16)    [ x] PPSV2 < or = 30% [ ] significant weight loss [x ] poor nutritional intake [ ] anasarca   Artificial Nutrition [ ]     Other REFERRALS:    [ ] Hospice  [ ]Child Life  [x ]Social Work  [ ]Case management [ ]Holistic Therapy [ ] Physical Therapy [ ] Dietary   Goals of Care Document:  GAP TEAM PALLIATIVE CARE UNIT PROGRESS NOTE:      [  ] Patient on hospice program.    INDICATION FOR PALLIATIVE CARE UNIT SERVICES/Interval HPI: 89 F w/ PMHx of HTN, presented from [facility] 10/19 for hypoxia and lethargy, found to have fractures in L ribs 4-6, admitted to SICU. Treated for UTI, later found to have presumed aspiration PNA, currently on abx. Stay c/b bloody BMs, desaturations on NRB, swallowing dysfunction w/ failed SS eval. Pt w/ progressive lethargy and confusion, palliative consulted and GOC conversation held w/ HCP nephew Brendon Lopez. Agreed to comfort care measures given patient had expressed wishes for a peaceful and pain free passing in the past. Now on PCU for symptom management and comfort care.     OVERNIGHT EVENTS: Patient seen at bedside, chart reviewed. Over 24 hr period from 8 AM to 8 AM, patient received robinul 0.4 mg x1, dialudid 0.5 mg x1 for dyspnea, and ativan 1 mg x1 PRN.     DNR on chart: Yes  Yes      Allergies    No Known Allergies    Intolerances    MEDICATIONS  (STANDING):  artificial  tears Solution 1 Drop(s) Both EYES two times a day  lidocaine   4% Patch 1 Patch Transdermal every 24 hours  LORazepam   Injectable 0.5 milliGRAM(s) IV Push every 6 hours  piperacillin/tazobactam IVPB.. 3.375 Gram(s) IV Intermittent every 8 hours    MEDICATIONS  (PRN):  acetaminophen  Suppository .. 650 milliGRAM(s) Rectal every 6 hours PRN Temp greater or equal to 38C (100.4F), Mild Pain (1 - 3)  bisacodyl Suppository 10 milliGRAM(s) Rectal daily PRN Constipation  glycopyrrolate Injectable 0.4 milliGRAM(s) IV Push every 6 hours PRN secretions  HYDROmorphone  Injectable 0.5 milliGRAM(s) IV Push every 1 hour PRN dyspnea  HYDROmorphone  Injectable 0.5 milliGRAM(s) IV Push every 1 hour PRN Severe Pain (7 - 10)  HYDROmorphone  Injectable 0.3 milliGRAM(s) IV Push every 1 hour PRN Moderate Pain (4 - 6)  LORazepam   Injectable 1 milliGRAM(s) IV Push every 1 hour PRN Agitation  ondansetron Injectable 4 milliGRAM(s) IV Push every 6 hours PRN Nausea and/or Vomiting    ITEMS UNCHECKED ARE NOT PRESENT    PRESENT SYMPTOMS: [x ]Unable to self-report  [x ]PAINADs [x ]RDOS  Source if other than patient:  [x ]Family   [x]Team     Pain: [ ] yes [x ] no  QOL impact -   Location -                    Aggravating factors -  Quality -  Radiation -  Timing-  Severity (0-10 scale):  Minimal acceptable level (0-10 scale):     PAINAD Score: 0  http://geriatrictoolkit.[facility]/cog/painad.pdf (Ctrl +  left click to view)    Dyspnea:                           [ ]Mild [ ]Moderate [ ]Severe    RDOS: 0  0 to 2  minimal or no respiratory distress   3  mild distress  4 to 6 moderate distress  >7 severe distress  https://[email or web address]/handouts/hen/Respiratory_Distress_Observation_Scale.pdf (Ctrl +  left click to view)     Anxiety:                             [ ]Mild [ ]Moderate [ ]Severe  Fatigue:                             [ ]Mild [ ]Moderate [ ]Severe  Nausea:                             [ ]Mild [ ]Moderate [ ]Severe  Loss of appetite:              [ ]Mild [ ]Moderate [ ]Severe  Constipation:                    [ ]Mild [ ]Moderate [ ]Severe    PCSSQ[Palliative Care Spiritual Screening Question]   Severity (0-10):  Score of 4 or > indicate consideration of Chaplaincy referral.  Chaplaincy Referral: [ ] yes [ ] refused [x ] following [ ] Deferred     Caregiver [place]eral? : [ ] yes [x ] no [ ] Deferred [ ] Declined             Social work referral [ ] Patient & Family Centered Care Referral [ ]     Anticipatory Grief present?:  [x ] yes [ ] no  [ ] Deferred                  Social work referral [x ] Chaplaincy Referral [ ]    		  Other Symptoms:  [ ]All other review of systems negative- unable to assess due to poor mentation     Palliative Performance Status Version 2:  20%         http://npcrc.org/files/news/palliative_performance_scale_ppsv2.pdf  PHYSICAL EXAM:   Vital Signs Last 24 Hrs  T(C): --  T(F): --  HR: --  BP: --  BP(mean): --  RR: --  SpO2: --     I&O's Summary    02 Nov 2022 07:01  -  03 Nov 2022 07:00  --------------------------------------------------------  IN: 0 mL / OUT: 350 mL / NET: -350 mL      GENERAL: [ ] Cachexia  [ ]Alert  [ ]Oriented   [x ]Lethargic  [ ]Unarousable  [ ]Verbal  [ ]Non-Verbal  Behavioral:   [ ] Anxiety  [ ] Delirium [ x] Agitation [ ] Other  HEENT:  [ ]Normal   [ ]Dry mouth   [ ]ET Tube/Trach  [ ]Oral lesions   PULMONARY:   [x ]Clear [ ]Tachypnea  [ ]Audible excessive secretions   [ ]Rhonchi        [ ]Right [ ]Left [ ]Bilateral  [ ]Crackles        [ ]Right [ ]Left [ ]Bilateral  [ ]Wheezing     [ ]Right [ ]Left [ ]Bilateral  [ ]Diminished BS [ ]Right [ ]Left [ ]Bilateral    CARDIOVASCULAR:    [ ]Regular [x ]Irregular [ ]Tachy  [ ]Kevan [ ]Murmur [ ]Other  GASTROINTESTINAL:  [x ]Soft  [ ]Distended   [x ]+BS  [x ]Non tender [ ]Tender  [ ]Other [ ]PEG [ ]OGT/ NGT   Last BM:  10/31/22  GENITOURINARY:  [ ]Normal [x ] Incontinent   [ ]Oliguria/Anuria   [x ]Spangler  MUSCULOSKELETAL:   [ ]Normal   [x ]Weakness  [x ]Bed/Wheelchair bound [ ]Edema  NEUROLOGIC:   [ ]No focal deficits  [x ] Cognitive impairment  [ ] Dysphagia [ ]Dysarthria [ ] Paresis [ ]Other   SKIN: Incontinence associated dermatitis   [x ]Normal  [ ]Rash  [ ]Other  [ ]Pressure ulcer(s)  [ ]y [ ]n  Present on admission      CRITICAL CARE:  [ ] Shock Present  [ ]Septic [ ]Cardiogenic [ ]Neurologic [ ]Hypovolemic  [ ]  Vasopressors [ ]  Inotropes   [ ] Respiratory failure present [ ] Mechanical Ventilation [ ] Non-invasive ventilatory support [ ] High-Flow  [ ] Acute  [ ] Chronic [ ] Hypoxic  [ ] Hypercarbic [ ] Other  [ ] Other organ failure     LABS: None new    RADIOLOGY & ADDITIONAL STUDIES: None new    PROTEIN CALORIE MALNUTRITION: [ ] mild [ ] moderate [ ] severe  [ ] underweight [ ] morbid obesity    https://www.andeal.org/vault/8297/web/files/ONC/Table_Clinical%20Characteristics%20to%20Document%20Malnutrition-White%20JV%20et%20al%754555.pdf    Height (cm): 157.5 (10-19-22 @ 13:15), 157.5 (10-09-22 @ 18:16), 157.5 (09-02-22 @ 13:20)  Weight (kg): 48.4 (10-19-22 @ 23:36), 59 (09-02-22 @ 13:20)  BMI (kg/m2): 19.5 (10-19-22 @ 23:36), 23.8 (10-19-22 @ 13:15), 23.8 (10-09-22 @ 18:16)    [ x] PPSV2 < or = 30% [ ] significant weight loss [x ] poor nutritional intake [ ] anasarca   Artificial Nutrition [ ]     Other REFERRALS:    [x ] Hospice  [ ]Child Life  [x ]Social Work  [ ]Case management [ ]Holistic Therapy [ ] Physical Therapy [ ] Dietary   Goals of Care Document:  GAP TEAM PALLIATIVE CARE UNIT PROGRESS NOTE:      [  ] Patient on hospice program.    INDICATION FOR PALLIATIVE CARE UNIT SERVICES/Interval HPI: 89 F w/ PMHx of HTN, presented from [facility] 10/19 for hypoxia and lethargy, found to have fractures in L ribs 4-6, admitted to SICU. Treated for UTI, later found to have presumed aspiration PNA, currently on abx. Stay c/b bloody BMs, desaturations on NRB, swallowing dysfunction w/ failed SS eval. Pt w/ progressive lethargy and confusion, palliative consulted and GOC conversation held w/ HCP nephew Brendon Lopez. Agreed to comfort care measures given patient had expressed wishes for a peaceful and pain free passing in the past. Now on PCU for symptom management and comfort care.     OVERNIGHT EVENTS: Patient seen at bedside, chart reviewed. Over 24 hr period from 8 AM to 8 AM, patient received robinul 0.4 mg x1, dialudid 0.5 mg x1 for dyspnea, and ativan 1 mg x1 PRN.     DNR on chart: Yes  Yes      Allergies    No Known Allergies    Intolerances    MEDICATIONS  (STANDING):  artificial  tears Solution 1 Drop(s) Both EYES two times a day  lidocaine   4% Patch 1 Patch Transdermal every 24 hours  LORazepam   Injectable 0.5 milliGRAM(s) IV Push every 6 hours  piperacillin/tazobactam IVPB.. 3.375 Gram(s) IV Intermittent every 8 hours    MEDICATIONS  (PRN):  acetaminophen  Suppository .. 650 milliGRAM(s) Rectal every 6 hours PRN Temp greater or equal to 38C (100.4F), Mild Pain (1 - 3)  bisacodyl Suppository 10 milliGRAM(s) Rectal daily PRN Constipation  glycopyrrolate Injectable 0.4 milliGRAM(s) IV Push every 6 hours PRN secretions  HYDROmorphone  Injectable 0.5 milliGRAM(s) IV Push every 1 hour PRN dyspnea  HYDROmorphone  Injectable 0.5 milliGRAM(s) IV Push every 1 hour PRN Severe Pain (7 - 10)  HYDROmorphone  Injectable 0.3 milliGRAM(s) IV Push every 1 hour PRN Moderate Pain (4 - 6)  LORazepam   Injectable 1 milliGRAM(s) IV Push every 1 hour PRN Agitation  ondansetron Injectable 4 milliGRAM(s) IV Push every 6 hours PRN Nausea and/or Vomiting    ITEMS UNCHECKED ARE NOT PRESENT    PRESENT SYMPTOMS: [x ]Unable to self-report  [x ]PAINADs [x ]RDOS  Source if other than patient:  [x ]Family   [x]Team     Pain: [ ] yes [x ] no  QOL impact -   Location -                    Aggravating factors -  Quality -  Radiation -  Timing-  Severity (0-10 scale):  Minimal acceptable level (0-10 scale):     PAINAD Score: 0  [email or web address].[email or web address]/cog/painad.pdf (Ctrl +  left click to view)    Dyspnea:                           [ ]Mild [ ]Moderate [ ]Severe    RDOS: 0  0 to 2  minimal or no respiratory distress   3  mild distress  4 to 6 moderate distress  >7 severe distress  https://[email or web address]/handouts/hen/Respiratory_Distress_Observation_Scale.pdf (Ctrl +  left click to view)     Anxiety:                             [ ]Mild [ ]Moderate [ ]Severe  Fatigue:                             [ ]Mild [ ]Moderate [ ]Severe  Nausea:                             [ ]Mild [ ]Moderate [ ]Severe  Loss of appetite:              [ ]Mild [ ]Moderate [ ]Severe  Constipation:                    [ ]Mild [ ]Moderate [ ]Severe    PCSSQ[Palliative Care Spiritual Screening Question]   Severity (0-10):  Score of 4 or > indicate consideration of Chaplaincy referral.  Chaplaincy Referral: [ ] yes [ ] refused [x ] following [ ] Deferred     Caregiver [place]? : [ ] yes [x ] no [ ] Deferred [ ] Declined             Social work referral [ ] Patient & Family Centered Care Referral [ ]     Anticipatory Grief present?:  [x ] yes [ ] no  [ ] Deferred                  Social work referral [x ] Chaplaincy Referral [ ]    		  Other Symptoms:  [ ]All other review of systems negative- unable to assess due to poor mentation     Palliative Performance Status Version 2: 20%         http://npcrc.org/files/news/palliative_performance_scale_ppsv2.pdf  PHYSICAL EXAM:   Vital Signs Last 24 Hrs  T(C): 36.5 (03 Nov 2022 10:50), Max: 36.5 (03 Nov 2022 10:50)  T(F): 97.7 (03 Nov 2022 10:50), Max: 97.7 (03 Nov 2022 10:50)  HR: 74 (03 Nov 2022 10:50) (74 - 74)  BP: 171/77 (03 Nov 2022 10:50) (171/77 - 171/77)  BP(mean): --  RR: 16 (03 Nov 2022 10:50) (16 - 16)  SpO2: 95% (03 Nov 2022 10:50) (95% - 95%)    Parameters below as of 03 Nov 2022 10:50  Patient On (Oxygen Delivery Method): room air  I&O's Summary    02 Nov 2022 07:01  -  03 Nov 2022 07:00  --------------------------------------------------------  IN: 0 mL / OUT: 350 mL / NET: -350 mL      GENERAL: [ ] Cachexia  [ ]Alert  [ ]Oriented   [x ]Lethargic  [ ]Unarousable  [ ]Verbal  [ ]Non-Verbal  Behavioral:   [ ] Anxiety  [ ] Delirium [ x] Agitation [ ] Other  HEENT:  [ ]Normal   [ ]Dry mouth   [ ]ET Tube/Trach  [ ]Oral lesions   PULMONARY:   [x ]Clear [ ]Tachypnea  [ ]Audible excessive secretions   [ ]Rhonchi        [ ]Right [ ]Left [ ]Bilateral  [ ]Crackles        [ ]Right [ ]Left [ ]Bilateral  [ ]Wheezing     [ ]Right [ ]Left [ ]Bilateral  [ ]Diminished BS [ ]Right [ ]Left [ ]Bilateral    CARDIOVASCULAR:    [ ]Regular [x ]Irregular [ ]Tachy  [ ]Kevan [ ]Murmur [ ]Other  GASTROINTESTINAL:  [x ]Soft  [ ]Distended   [x ]+BS  [x ]Non tender [ ]Tender  [ ]Other [ ]PEG [ ]OGT/ NGT   Last BM:  10/31/22  GENITOURINARY:  [ ]Normal [x ] Incontinent   [ ]Oliguria/Anuria   [x ]Spangler  MUSCULOSKELETAL:   [ ]Normal   [x ]Weakness  [x ]Bed/Wheelchair bound [ ]Edema  NEUROLOGIC:   [ ]No focal deficits  [x ] Cognitive impairment  [ ] Dysphagia [ ]Dysarthria [ ] Paresis [ ]Other   SKIN: Incontinence associated dermatitis   [x ]Normal  [ ]Rash  [ ]Other  [ ]Pressure ulcer(s)  [ ]y [ ]n  Present on admission      CRITICAL CARE:  [ ] Shock Present  [ ]Septic [ ]Cardiogenic [ ]Neurologic [ ]Hypovolemic  [ ]  Vasopressors [ ]  Inotropes   [ ] Respiratory failure present [ ] Mechanical Ventilation [ ] Non-invasive ventilatory support [ ] High-Flow  [ ] Acute  [ ] Chronic [ ] Hypoxic  [ ] Hypercarbic [ ] Other  [ ] Other organ failure     LABS: None new    RADIOLOGY & ADDITIONAL STUDIES: None new    PROTEIN CALORIE MALNUTRITION: [ ] mild [ ] moderate [ ] severe  [ ] underweight [ ] morbid obesity    https://www.andeal.org/vault/2440/web/files/ONC/Table_Clinical%20Characteristics%20to%20Document%20Malnutrition-White%20JV%20et%20al%202012.pdf    Height (cm): 157.5 (10-19-22 @ 13:15), 157.5 (10-09-22 @ 18:16), 157.5 (09-02-22 @ 13:20)  Weight (kg): 48.4 (10-19-22 @ 23:36), 59 (09-02-22 @ 13:20)  BMI (kg/m2): 19.5 (10-19-22 @ 23:36), 23.8 (10-19-22 @ 13:15), 23.8 (10-09-22 @ 18:16)    [ x] PPSV2 < or = 30% [ ] significant weight loss [x ] poor nutritional intake [ ] anasarca   Artificial Nutrition [ ]     Other REFERRALS:    [x ] Hospice  [ ]Child Life  [x ]Social Work  [ ]Case management [ ]Holistic Therapy [ ] Physical Therapy [ ] Dietary   Goals of Care Document:

## 2022-11-04 PROCEDURE — 99233 SBSQ HOSP IP/OBS HIGH 50: CPT | Mod: FS,GC

## 2022-11-04 RX ADMIN — LIDOCAINE 1 PATCH: 4 CREAM TOPICAL at 08:35

## 2022-11-04 RX ADMIN — Medication 1 MILLIGRAM(S): at 11:08

## 2022-11-04 RX ADMIN — Medication 1 DROP(S): at 17:56

## 2022-11-04 RX ADMIN — Medication 0.5 MILLIGRAM(S): at 05:09

## 2022-11-04 RX ADMIN — HYDROMORPHONE HYDROCHLORIDE 0.5 MILLIGRAM(S): 2 INJECTION INTRAMUSCULAR; INTRAVENOUS; SUBCUTANEOUS at 23:03

## 2022-11-04 RX ADMIN — Medication 1 DROP(S): at 05:09

## 2022-11-04 RX ADMIN — Medication 1 MILLIGRAM(S): at 17:56

## 2022-11-04 RX ADMIN — HYDROMORPHONE HYDROCHLORIDE 0.5 MILLIGRAM(S): 2 INJECTION INTRAMUSCULAR; INTRAVENOUS; SUBCUTANEOUS at 17:03

## 2022-11-04 RX ADMIN — Medication 2 MILLIGRAM(S): at 16:40

## 2022-11-04 RX ADMIN — Medication 1 MILLIGRAM(S): at 23:03

## 2022-11-04 RX ADMIN — LIDOCAINE 1 PATCH: 4 CREAM TOPICAL at 11:11

## 2022-11-04 RX ADMIN — Medication 1 MILLIGRAM(S): at 09:27

## 2022-11-04 NOTE — PROGRESS NOTE ADULT - PROBLEM SELECTOR PLAN 6
- Likely aspiration PNA vs atelectasis given retrocardiac opacity seen on CXR   - 10 day course of zosyn, last dose scheduled for today  - Nephew amenable to finishing this course of abx
- likely 2/2 acute illnesses vs delirium vs blood loss anemia  - Pt AOx0, minimally cooperative w/ nursing, unable to take PO medicine  - hold home PO medications for now  - Monitor for constipation, urinary retention, pain, hunger, thirst, etc.  Promote sleep wake cycle and reorientation as indicated.
- Likely aspiration PNA vs atelectasis given retrocardiac opacity seen on CXR   - 10 day course of zosyn, last dose scheduled for 11/03/22  - Nephew amenable to finishing this course of abx
- Likely aspiration PNA vs atelectasis given retrocardiac opacity seen on CXR   - 10 day course of zosyn, last dose scheduled for 11/03/22  - Nephew amenable to finishing this course of abx
- Medical student called and spoke to the patient's nephew Alexey Maza. Updates provided. Questions answered.  Emotional support provided.   - The patient does not have a reliable oral route and, therefore, her oral meds will be discontinued. Medications that can be converted to IV will be. This was discussed with Alexey and he is ok with this  - Patient transferred to the PCU for management of symptoms and disposition planning which will be guided by clinical course.

## 2022-11-04 NOTE — PROGRESS NOTE ADULT - PROBLEM SELECTOR PLAN 1
- Patient with audible breath sounds  - Robinul 0.4mg IV q6hr PRN ordered (0 required within a 24 hr period 8am -8am)   - Turn and position for postural drainage

## 2022-11-04 NOTE — PROGRESS NOTE ADULT - PROVIDER SPECIALTY LIST ADULT
Hospitalist
Trauma Surgery
Trauma Surgery
Hospitalist
Palliative Care
Surgery
Trauma Surgery
Hospitalist
SICU
Trauma Surgery
Trauma Surgery
Hospitalist
Hospitalist
Palliative Care

## 2022-11-04 NOTE — PROGRESS NOTE ADULT - SUBJECTIVE AND OBJECTIVE BOX
GAP TEAM PALLIATIVE CARE UNIT PROGRESS NOTE:      [  ] Patient on hospice program.    INDICATION FOR PALLIATIVE CARE UNIT SERVICES/Interval HPI:    OVERNIGHT EVENTS:    DNR on chart: Yes  Yes      Allergies    No Known Allergies    Intolerances    MEDICATIONS  (STANDING):  artificial  tears Solution 1 Drop(s) Both EYES two times a day  lidocaine   4% Patch 1 Patch Transdermal every 24 hours  LORazepam   Injectable 0.5 milliGRAM(s) IV Push every 6 hours    MEDICATIONS  (PRN):  acetaminophen  Suppository .. 650 milliGRAM(s) Rectal every 6 hours PRN Temp greater or equal to 38C (100.4F), Mild Pain (1 - 3)  bisacodyl Suppository 10 milliGRAM(s) Rectal daily PRN Constipation  glycopyrrolate Injectable 0.4 milliGRAM(s) IV Push every 6 hours PRN secretions  HYDROmorphone  Injectable 0.5 milliGRAM(s) IV Push every 1 hour PRN dyspnea  HYDROmorphone  Injectable 0.5 milliGRAM(s) IV Push every 1 hour PRN Severe Pain (7 - 10)  HYDROmorphone  Injectable 0.3 milliGRAM(s) IV Push every 1 hour PRN Moderate Pain (4 - 6)  LORazepam   Injectable 1 milliGRAM(s) IV Push every 1 hour PRN Agitation  ondansetron Injectable 4 milliGRAM(s) IV Push every 6 hours PRN Nausea and/or Vomiting    ITEMS UNCHECKED ARE NOT PRESENT    PRESENT SYMPTOMS: [ ]Unable to self-report  [ ]PAINADs [ ]RDOS  Source if other than patient:  [ ]Family   [ ]Team     Pain: [ ] yes [ ] no  QOL impact -   Location -                    Aggravating factors -  Quality -Te  Radiation -  Timing-  Severity (0-10 scale):G  Minimal acceptable level (0-10 scale):     PAINAD Score:  [email or web address]/painad.[email or web address] (Ctrl +  left click to view)    Dyspnea:                           [ ]Mild [ ]Moderate [ ]Severe    RDOS:  0 to 2  minimal or no respiratory distress   3  mild distress  4 to 6 moderate distress  >7 severe distress  [email or web address]://[email or web address]/handouts/hen/Respiratory_Distress_Observation_Scale.pdf (Ctrl +  left click to view)     Anxiety:                             [ ]Mild [ ]Moderate [ ]Severe  Fatigue:                             [ ]Mild [ ]Moderate [ ]Severe  Nausea:                             [ ]Mild [ ]Moderate [ ]Severe  Loss of appetite:              [ ]Mild [ ]Moderate [ ]Severe  Constipation:                    [ ]Mild [ ]Moderate [ ]Severe    PCSSQ[Palliative Care Spiritual Screening Question]   Severity (0-10):  Score of 4 or > indicate consideration of Chaplaincy referral.  Chaplaincy Referral: [ ] yes [ ] refused [ ] following [ ] Deferred     Caregiver [place]? : [ ] yes [ ] no [ ] Deferred [ ] Declined             Social work referral [ ] Patient & Family Centered Care Referral [ ]     Anticipatory Grief present?:  [ ] yes [ ] no  [ ] Deferred                  Social work referral [ ] Chaplaincy Referral [ ]    		  Other Symptoms:  [ ]All other review of systems negative     Palliative Performance Status Version 2:         %         http://npcrc.org/files/news/palliative_performance_scale_ppsv2.pdf  PHYSICAL EXAM:   Vital Signs Last 24 Hrs  T(C): 36.8 (04 Nov 2022 07:43), Max: 36.8 (04 Nov 2022 07:43)  T(F): 98.2 (04 Nov 2022 07:43), Max: 98.2 (04 Nov 2022 07:43)  HR: 73 (04 Nov 2022 07:43) (73 - 74)  BP: 187/78 (04 Nov 2022 07:43) (171/77 - 187/78)  BP(mean): --  RR: 18 (04 Nov 2022 07:43) (16 - 18)  SpO2: 91% (04 Nov 2022 07:43) (91% - 95%)    Parameters below as of 04 Nov 2022 07:43  Patient On (Oxygen Delivery Method): room air     I&O's Summary    03 Nov 2022 07:01  -  04 Nov 2022 07:00  --------------------------------------------------------  IN: 100 mL / OUT: 500 mL / NET: -400 mL      GENERAL: [ ] Cachexia  [ ]Alert  [ ]Oriented x   [ ]Lethargic  [ ]Unarousable  [ ]Verbal  [ ]Non-Verbal  Behavioral:   [ ] Anxiety  [ ] Delirium [ ] Agitation [ ] Other  HEENT:  [ ]Normal   [ ]Dry mouth   [ ]ET Tube/Trach  [ ]Oral lesions  PULMONARY:   [ ]Clear [ ]Tachypnea  [ ]Audible excessive secretions   [ ]Rhonchi        [ ]Right [ ]Left [ ]Bilateral  [ ]Crackles        [ ]Right [ ]Left [ ]Bilateral  [ ]Wheezing     [ ]Right [ ]Left [ ]Bilateral  [ ]Diminished BS [ ]Right [ ]Left [ ]Bilateral    CARDIOVASCULAR:    [ ]Regular [ ]Irregular [ ]Tachy  [ ]Kevan [ ]Murmur [ ]Other  GASTROINTESTINAL:  [ ]Soft  [ ]Distended   [ ]+BS  [ ]Non tender [ ]Tender  [ ]Other [ ]PEG [ ]OGT/ NGT   Last BM:    GENITOURINARY:  [ ]Normal [ ] Incontinent   [ ]Oliguria/Anuria   [ ]Spangler  MUSCULOSKELETAL:   [ ]Normal   [ ]Weakness  [ ]Bed/Wheelchair bound [ ]Edema  NEUROLOGIC:   [ ]No focal deficits  [ ] Cognitive impairment  [ ] Dysphagia [ ]Dysarthria [ ] Paresis [ ]Other   SKIN:   [ ]Normal  [ ]Rash  [ ]Other  [ ]Pressure ulcer(s)  [ ]y [ ]n  Present on admission      CRITICAL CARE:  [ ] Shock Present  [ ]Septic [ ]Cardiogenic [ ]Neurologic [ ]Hypovolemic  [ ]  Vasopressors [ ]  Inotropes   [ ] Respiratory failure present [ ] Mechanical Ventilation [ ] Non-invasive ventilatory support [ ] High-Flow  [ ] Acute  [ ] Chronic [ ] Hypoxic  [ ] Hypercarbic [ ] Other  [ ] Other organ failure     LABS:            RADIOLOGY & ADDITIONAL STUDIES:    PROTEIN CALORIE MALNUTRITION: [ ] mild [ ] moderate [ ] severe  [ ] underweight [ ] morbid obesity    https://www.andeal.org/vault/8540/web/files/ONC/Table_Clinical%20Characteristics%20to%20Document%20Malnutrition-White%20JV%20et%20al%651004.pdf    Height (cm): 157.5 (10-19-22 @ 13:15), 157.5 (10-09-22 @ 18:16), 157.5 (09-02-22 @ 13:20)  Weight (kg): 48.4 (10-19-22 @ 23:36), 59 (09-02-22 @ 13:20)  BMI (kg/m2): 19.5 (10-19-22 @ 23:36), 23.8 (10-19-22 @ 13:15), 23.8 (10-09-22 @ 18:16)    [ ] PPSV2 < or = 30% [ ] significant weight loss [ ] poor nutritional intake [ ] anasarca   Artificial Nutrition [ ]     Other REFERRALS:    [ ] Hospice  [ ]Child Life  [ ]Social Work  [ ]Case management [ ]Holistic Therapy [ ] Physical Therapy [ ] Dietary   Goals of Care Document:  GAP TEAM PALLIATIVE CARE UNIT PROGRESS NOTE:      [  ] Patient on hospice program.    INDICATION FOR PALLIATIVE CARE UNIT SERVICES/Interval HPI: 89 F w/ PMHx of HTN, presented from [facility] 10/19 for hypoxia and lethargy, found to have fractures in L ribs 4-6, admitted to SICU. Treated for UTI, later found to have presumed aspiration PNA, currently on abx. Stay c/b bloody BMs, desaturations on NRB, swallowing dysfunction w/ failed SS eval. Pt w/ progressive lethargy and confusion, palliative consulted and GOC conversation held w/ HCP nephew Brendon Lopez. Agreed to comfort care measures given patient had expressed wishes for a peaceful and pain free passing in the past. Now on PCU for symptom management and comfort care.     OVERNIGHT EVENTS: Chart reviewed, patient seen at bedside. Patient opens eyes to voice today and repeats "Get me out of bed" to all questions. Over a 24 hour period from 8 AM to 8 AM, patient received ativan 1 mg IVP x2.    DNR on chart: Yes  Yes      Allergies    No Known Allergies    Intolerances    MEDICATIONS  (STANDING):  artificial  tears Solution 1 Drop(s) Both EYES two times a day  lidocaine   4% Patch 1 Patch Transdermal every 24 hours  LORazepam   Injectable 0.5 milliGRAM(s) IV Push every 6 hours    MEDICATIONS  (PRN):  acetaminophen  Suppository .. 650 milliGRAM(s) Rectal every 6 hours PRN Temp greater or equal to 38C (100.4F), Mild Pain (1 - 3)  bisacodyl Suppository 10 milliGRAM(s) Rectal daily PRN Constipation  glycopyrrolate Injectable 0.4 milliGRAM(s) IV Push every 6 hours PRN secretions  HYDROmorphone  Injectable 0.5 milliGRAM(s) IV Push every 1 hour PRN dyspnea  HYDROmorphone  Injectable 0.5 milliGRAM(s) IV Push every 1 hour PRN Severe Pain (7 - 10)  HYDROmorphone  Injectable 0.3 milliGRAM(s) IV Push every 1 hour PRN Moderate Pain (4 - 6)  LORazepam   Injectable 1 milliGRAM(s) IV Push every 1 hour PRN Agitation  ondansetron Injectable 4 milliGRAM(s) IV Push every 6 hours PRN Nausea and/or Vomiting    ITEMS UNCHECKED ARE NOT PRESENT    PRESENT SYMPTOMS: [ x]Unable to self-report  [ x]PAINADs [ x]RDOS  Source if other than patient:  [ ]Family   [ x]Team     Pain: [ ] yes [ x] no  QOL impact -   Location -                    Aggravating factors -  Quality -Te  Radiation -  Timing-  Severity (0-10 scale):G  Minimal acceptable level (0-10 scale):     PAINAD Score:  [email or web address]/[email or web address].pdf (Ctrl +  left click to view)    Dyspnea:                           [x ]Mild [ ]Moderate [ ]Severe    RDOS: 0  0 to 2  minimal or no respiratory distress   3  mild distress  4 to 6 moderate distress  >7 severe distress  https://[email or web address]/handouts/hen/Respiratory_Distress_Observation_Scale.pdf (Ctrl +  left click to view)     Anxiety:                             [ ]Mild [ ]Moderate [ ]Severe  Fatigue:                             [ ]Mild [ ]Moderate [ ]Severe  Nausea:                             [ ]Mild [ ]Moderate [ ]Severe  Loss of appetite:              [ ]Mild [ ]Moderate [ ]Severe  Constipation:                    [ ]Mild [ ]Moderate [ ]Severe    PCSSQ[Palliative Care Spiritual Screening Question]   Severity (0-10):  Score of 4 or > indicate consideration of Chaplaincy referral.  Chaplaincy Referral: [ ] yes [ ] refused [x ] following [ ] Deferred     Caregiver [place]? : [ ] yes [x ] no [ ] Deferred [ ] Declined             Social work referral [ ] Patient & Family Centered Care Referral [ ]     Anticipatory Grief present?:  [x ] yes [ ] no  [ ] Deferred                  Social work referral [x ] Chaplaincy Referral [ ]    		  Other Symptoms:  [ ]All other review of systems negative     Palliative Performance Status Version 2:         20%         http://npcrc.org/files/news/palliative_performance_scale_ppsv2.pdf  PHYSICAL EXAM:   Vital Signs Last 24 Hrs  T(C): 36.8 (04 Nov 2022 07:43), Max: 36.8 (04 Nov 2022 07:43)  T(F): 98.2 (04 Nov 2022 07:43), Max: 98.2 (04 Nov 2022 07:43)  HR: 73 (04 Nov 2022 07:43) (73 - 74)  BP: 187/78 (04 Nov 2022 07:43) (171/77 - 187/78)  BP(mean): --  RR: 18 (04 Nov 2022 07:43) (16 - 18)  SpO2: 91% (04 Nov 2022 07:43) (91% - 95%)    Parameters below as of 04 Nov 2022 07:43  Patient On (Oxygen Delivery Method): room air     I&O's Summary    03 Nov 2022 07:01  -  04 Nov 2022 07:00  --------------------------------------------------------  IN: 100 mL / OUT: 500 mL / NET: -400 mL      GENERAL: [ ] Cachexia  [ ]Alert  [ ]Oriented  [x ]Lethargic  [ ]Unarousable  [ ]Verbal  [ ]Non-Verbal  Behavioral:   [ ] Anxiety  [x ] Delirium [ ] Agitation [ ] Other  HEENT:  [ ]Normal   [ ]Dry mouth   [ ]ET Tube/Trach  [ ]Oral lesions  PULMONARY:   [x ]Clear [ ]Tachypnea  [ ]Audible excessive secretions   [ ]Rhonchi        [ ]Right [ ]Left [ ]Bilateral  [ ]Crackles        [ ]Right [ ]Left [ ]Bilateral  [ ]Wheezing     [ ]Right [ ]Left [ ]Bilateral  [ ]Diminished BS [ ]Right [ ]Left [ ]Bilateral    CARDIOVASCULAR:    [ ]Regular [x ]Irregular [ ]Tachy  [ ]Kevan [ ]Murmur [ ]Other  GASTROINTESTINAL:  [x ]Soft  [ ]Distended   [ ]+BS  [x ]Non tender [ ]Tender  [ ]Other [ ]PEG [ ]OGT/ NGT   Last BM:    GENITOURINARY:  [ ]Normal [ ] Incontinent   [ ]Oliguria/Anuria   [x ]Spangler  MUSCULOSKELETAL:   [ ]Normal   [ ]Weakness  [x ]Bed/Wheelchair bound [ ]Edema  NEUROLOGIC:   [ ]No focal deficits  [ ] Cognitive impairment  [ ] Dysphagia [ ]Dysarthria [ ] Paresis [ ]Other   SKIN:   [x ]Normal  [ ]Rash  [ ]Other  [ ]Pressure ulcer(s)  [ ]y [ ]n  Present on admission      CRITICAL CARE:  [ ] Shock Present  [ ]Septic [ ]Cardiogenic [ ]Neurologic [ ]Hypovolemic  [ ]  Vasopressors [ ]  Inotropes   [ ] Respiratory failure present [ ] Mechanical Ventilation [ ] Non-invasive ventilatory support [ ] High-Flow  [ ] Acute  [ ] Chronic [ ] Hypoxic  [ ] Hypercarbic [ ] Other  [ ] Other organ failure     LABS:            RADIOLOGY & ADDITIONAL STUDIES:    PROTEIN CALORIE MALNUTRITION: [ ] mild [ ] moderate [ ] severe  [ ] underweight [ ] morbid obesity    https://www.andeal.org/vault/2440/web/files/ONC/Table_Clinical%20Characteristics%20to%20Document%20Malnutrition-White%20JV%20et%20al%202012.pdf    Height (cm): 157.5 (10-19-22 @ 13:15), 157.5 (10-09-22 @ 18:16), 157.5 (09-02-22 @ 13:20)  Weight (kg): 48.4 (10-19-22 @ 23:36), 59 (09-02-22 @ 13:20)  BMI (kg/m2): 19.5 (10-19-22 @ 23:36), 23.8 (10-19-22 @ 13:15), 23.8 (10-09-22 @ 18:16)    [ ] PPSV2 < or = 30% [ ] significant weight loss [ ] poor nutritional intake [ ] anasarca   Artificial Nutrition [ ]     Other REFERRALS:    [ ] Hospice  [ ]Child Life  [ ]Social Work  [ ]Case management [ ]Holistic Therapy [ ] Physical Therapy [ ] Dietary   Goals of Care Document:  GAP TEAM PALLIATIVE CARE UNIT PROGRESS NOTE:      [  ] Patient on hospice program.    INDICATION FOR PALLIATIVE CARE UNIT SERVICES/Interval HPI: 89 F w/ PMHx of HTN, presented from [facility] 10/19 for hypoxia and lethargy, found to have fractures in L ribs 4-6, admitted to SICU. Treated for UTI, later found to have presumed aspiration PNA, currently on abx. Stay c/b bloody BMs, desaturations on NRB, swallowing dysfunction w/ failed SS eval. Pt w/ progressive lethargy and confusion, palliative consulted and GOC conversation held w/ HCP nephew Brendon Lopez. Agreed to comfort care measures given patient had expressed wishes for a peaceful and pain free passing in the past. Now on PCU for symptom management and comfort care.     OVERNIGHT EVENTS: Chart reviewed, patient seen at bedside. Patient opens eyes to voice today and repeats "Get me out of bed" to all questions. Over a 24 hour period from 8 AM to 8 AM, patient received ativan 1 mg IVP x2.    DNR on chart: Yes  Yes      Allergies    No Known Allergies    Intolerances    MEDICATIONS  (STANDING):  artificial  tears Solution 1 Drop(s) Both EYES two times a day  lidocaine   4% Patch 1 Patch Transdermal every 24 hours  LORazepam   Injectable 0.5 milliGRAM(s) IV Push every 6 hours    MEDICATIONS  (PRN):  acetaminophen  Suppository .. 650 milliGRAM(s) Rectal every 6 hours PRN Temp greater or equal to 38C (100.4F), Mild Pain (1 - 3)  bisacodyl Suppository 10 milliGRAM(s) Rectal daily PRN Constipation  glycopyrrolate Injectable 0.4 milliGRAM(s) IV Push every 6 hours PRN secretions  HYDROmorphone  Injectable 0.5 milliGRAM(s) IV Push every 1 hour PRN dyspnea  HYDROmorphone  Injectable 0.5 milliGRAM(s) IV Push every 1 hour PRN Severe Pain (7 - 10)  HYDROmorphone  Injectable 0.3 milliGRAM(s) IV Push every 1 hour PRN Moderate Pain (4 - 6)  LORazepam   Injectable 1 milliGRAM(s) IV Push every 1 hour PRN Agitation  ondansetron Injectable 4 milliGRAM(s) IV Push every 6 hours PRN Nausea and/or Vomiting    ITEMS UNCHECKED ARE NOT PRESENT    PRESENT SYMPTOMS: [ x]Unable to self-report  [ x]PAINADs [ x]RDOS  Source if other than patient:  [ ]Family   [ x]Team     Pain: [ ] yes [ x] no  QOL impact -   Location -                    Aggravating factors -  Quality -TRadiation -  Timing-  Severity (0-10 scale):  Minimal acceptable level (0-10 scale):     PAINAD Score: 0  [email or web address]/painad.pdf (Ctrl +  left click to view)    Dyspnea:                           [x ]Mild [ ]Moderate [ ]Severe    RDOS: 0  0 to 2  minimal or no respiratory distress   3  mild distress  4 to 6 moderate distress  >7 severe distress  https://[email or web address]/handouts/hen/Respiratory_Distress_Observation_Scale.pdf (Ctrl +  left click to view)     Anxiety:                             [ ]Mild [ ]Moderate [ ]Severe  Fatigue:                             [ ]Mild [ ]Moderate [ ]Severe  Nausea:                             [ ]Mild [ ]Moderate [ ]Severe  Loss of appetite:              [ ]Mild [ ]Moderate [ ]Severe  Constipation:                    [ ]Mild [ ]Moderate [ ]Severe    PCSSQ[Palliative Care Spiritual Screening Question]   Severity (0-10):  Score of 4 or > indicate consideration of Chaplaincy referral.  Chaplaincy Referral: [ ] yes [ ] refused [x ] following [ ] Deferred     Caregiver [place]? : [ ] yes [x ] no [ ] Deferred [ ] Declined             Social work referral [ ] Patient & Family Centered Care Referral [ ]     Anticipatory Grief present?:  [x ] yes [ ] no  [ ] Deferred                  Social work referral [x ] Chaplaincy Referral [ ]    		  Other Symptoms:  [ ]All other review of systems negative - unable to assess due to poor mentation  Palliative Performance Status Version 2:  20%         http://npcrc.org/files/news/palliative_performance_scale_ppsv2.pdf  PHYSICAL EXAM:   Vital Signs Last 24 Hrs  T(C): 36.8 (04 Nov 2022 07:43), Max: 36.8 (04 Nov 2022 07:43)  T(F): 98.2 (04 Nov 2022 07:43), Max: 98.2 (04 Nov 2022 07:43)  HR: 73 (04 Nov 2022 07:43) (73 - 74)  BP: 187/78 (04 Nov 2022 07:43) (171/77 - 187/78)  BP(mean): --  RR: 18 (04 Nov 2022 07:43) (16 - 18)  SpO2: 91% (04 Nov 2022 07:43) (91% - 95%)    Parameters below as of 04 Nov 2022 07:43  Patient On (Oxygen Delivery Method): room air    I&O's Summary    03 Nov 2022 07:01  -  04 Nov 2022 07:00  --------------------------------------------------------  IN: 100 mL / OUT: 500 mL / NET: -400 mL      GENERAL: [ ] Cachexia  [ ]Alert  [ ]Oriented  [x ]Lethargic  [ ]Unarousable  [ ]Verbal  [ ]Non-Verbal  Behavioral:   [ ] Anxiety  [x ] Delirium [ ] Agitation [ ] Other  HEENT:  [ ]Normal   [ ]Dry mouth   [ ]ET Tube/Trach  [ ]Oral lesions  PULMONARY:   [x ]Clear [ ]Tachypnea  [ ]Audible excessive secretions   [ ]Rhonchi        [ ]Right [ ]Left [ ]Bilateral  [ ]Crackles        [ ]Right [ ]Left [ ]Bilateral  [ ]Wheezing     [ ]Right [ ]Left [ ]Bilateral  [ ]Diminished BS [ ]Right [ ]Left [ ]Bilateral    CARDIOVASCULAR:    [ ]Regular [x ]Irregular [ ]Tachy  [ ]Kevan [ ]Murmur [ ]Other  GASTROINTESTINAL:  [x ]Soft  [ ]Distended   [ ]+BS  [x ]Non tender [ ]Tender  [ ]Other [ ]PEG [ ]OGT/ NGT   Last BM:    GENITOURINARY:  [ ]Normal [ ] Incontinent   [ ]Oliguria/Anuria   [x ]Spangler  MUSCULOSKELETAL:   [ ]Normal   [ ]Weakness  [x ]Bed/Wheelchair bound [ ]Edema  NEUROLOGIC:   [ ]No focal deficits  [ ] Cognitive impairment  [ ] Dysphagia [ ]Dysarthria [ ] Paresis [ ]Other   SKIN:   [x ]Normal  [ ]Rash  [ ]Other  [ ]Pressure ulcer(s)  [ ]y [ ]n  Present on admission      CRITICAL CARE:  [ ] Shock Present  [ ]Septic [ ]Cardiogenic [ ]Neurologic [ ]Hypovolemic  [ ]  Vasopressors [ ]  Inotropes   [ ] Respiratory failure present [ ] Mechanical Ventilation [ ] Non-invasive ventilatory support [ ] High-Flow  [ ] Acute  [ ] Chronic [ ] Hypoxic  [ ] Hypercarbic [ ] Other  [ ] Other organ failure     LABS: None new    RADIOLOGY & ADDITIONAL STUDIES: None new    PROTEIN CALORIE MALNUTRITION: [ ] mild [ ] moderate [ ] severe  [ ] underweight [ ] morbid obesity    https://www.andeal.org/vault/2440/web/files/ONC/Table_Clinical%20Characteristics%20to%20Document%20Malnutrition-White%20JV%20et%20al%872760.pdf    Height (cm): 157.5 (10-19-22 @ 13:15), 157.5 (10-09-22 @ 18:16), 157.5 (09-02-22 @ 13:20)  Weight (kg): 48.4 (10-19-22 @ 23:36), 59 (09-02-22 @ 13:20)  BMI (kg/m2): 19.5 (10-19-22 @ 23:36), 23.8 (10-19-22 @ 13:15), 23.8 (10-09-22 @ 18:16)    [X ] PPSV2 < or = 30% [ ] significant weight loss [ ] poor nutritional intake [ ] anasarca   Artificial Nutrition [ ]     Other REFERRALS:    [ X] Hospice  [ ]Child Life  [X ]Social Work  [ ]Case management [ ]Holistic Therapy [ ] Physical Therapy [ ] Dietary   Goals of Care Document:

## 2022-11-04 NOTE — PROGRESS NOTE ADULT - NS ATTEND AMEND GEN_ALL_CORE FT
89F with HTN, presented from [facility] 10/19 for hypoxia and lethargy, found to have fractures in L ribs 4-6, admitted to SICU. Treated for UTI, later found to have presumed aspiration PNA, currently on abx. Stay c/b bloody BMs, desaturations on NRB, swallowing dysfunction w/ failed SS eval. Pt w/ progressive lethargy and confusion, palliative consulted and GOC conversation held w/ HCP nephew Brendon Lopez. Agreed to comfort care measures given patient had expressed wishes for a peaceful and pain free passing in the past. Now on PCU for symptom management and comfort care.     Pt with ongoing agitation requiring IV ativan PRN. Agree with scheduled IV ativan 1 mg q6 hours as above. Glycopyrrolate prn ordered for secretions. Continue symptom directed care in the PCU. Update and support provided to pt's nephew today who was appreciative of the care pt is receiving thus far.
89F with HTN, presented from [facility] 10/19 for hypoxia and lethargy, found to have fractures in L ribs 4-6, admitted to SICU. Treated for UTI, later found to have presumed aspiration PNA, currently on abx. Stay c/b bloody BMs, desaturations on NRB, swallowing dysfunction w/ failed SS eval. Pt w/ progressive lethargy and confusion, palliative consulted and GOC conversation held w/ HCP nephew Brendon Lopez. Agreed to comfort care measures given patient had expressed wishes for a peaceful and pain free passing in the past. Now on PCU for symptom management and comfort care.     Ongoing episodes of agitation over the past 24 hours, agree with increase in IV ativan to 1 mg q6 hours. Appreciate SW assistance with dispo planning to inpatient hospice. Pt has been accepted to [facility] pending bed availability.

## 2022-11-04 NOTE — PROGRESS NOTE ADULT - REASON FOR ADMISSION
fall
AMS and hypoxia
weakness
Hypoxia/AMS
Lethargy and AMS
AMS and hypoxia

## 2022-11-04 NOTE — PROGRESS NOTE ADULT - PROBLEM SELECTOR PLAN 4
- Continue with Ativan 0.5mg IV q6hr ATC   - Continue with PRN Ativan 1mg IV q1hr PRN  (2 required within a 24 hr period 8am -8am)   - Monitor for constipation, urinary retention, pain, hunger, thirst, etc.  Promote sleep wake cycle and reorientation as indicated. - Pt increased agitation, taking off gown, trying to get out of hospital bed  - Increase ATC Ativan to 1 mg q6h   - Increase PRN Ativan 2mg IV q1hr PRN  (2 required within a 24 hr period 8am -8am)   - Monitor for constipation, urinary retention, pain, hunger, thirst, etc.  Promote sleep wake cycle and reorientation as indicated. - Within 24 hour period from 8 AM to 8 AM, patient required Ativan 1 mg IV x2 PRN  - Pt increased agitation, taking off gown, trying to get out of hospital bed  - Increase ATC Ativan to 1 mg q6h   - Increase PRN Ativan 2mg IV q1hr PRN  - Monitor for constipation, urinary retention, pain, hunger, thirst, etc.  Promote sleep wake cycle and reorientation as indicated.

## 2022-11-04 NOTE — PROGRESS NOTE ADULT - PROBLEM SELECTOR PROBLEM 6
Pneumonia, aspiration
Pneumonia, aspiration
Encephalopathy acute
Pneumonia, aspiration
Palliative care encounter

## 2022-11-04 NOTE — PROGRESS NOTE ADULT - RESPIRATORY DISTRESS OBSERVATION: RESPIRATORY RATE
Less than or equal to 18 breaths
19 – 30 breaths
Less than or equal to 18 breaths

## 2022-11-04 NOTE — PROGRESS NOTE ADULT - PAIN ASSESSMENT ADVANCED DEMENTIA: FACIAL EXPRESSION
Smiling or inexpressive

## 2022-11-04 NOTE — PROGRESS NOTE ADULT - PROBLEM SELECTOR PLAN 7
- The team called and spoke to the patient's nephew Alexey. Updates provided. Questions answered.  Emotional support provided. Discussed that patient is currently stable in her dying process, and suggested beginning process for inpatient hospice referral. Alexey was amenable to all options.   - [facility] referral sent by  yesterday, follow up - [facility] referral sent by  yesterday,  to follow up  - Will continue with symptom management on the PCU - [facility] referral sent by  yesterday,  to follow up  - Will continue with symptom management on the PCU  - Nephew called today for update. Emotional support provided, confirmed that [facility] referral has been sent. Nephew would appreciate a daily phone call for assurance that patient is stable, states he "does not want to get a call that she has passed away" suddenly. Nephew continues to be agreeable to either [facility] or [facility].

## 2022-11-04 NOTE — PROGRESS NOTE ADULT - RESPIRATORY DISTRESS OBSERVATION: HEART RATE
Less than 90 beats
Comment: D/C’sean  [id number]
Detail Level: Simple
Render Risk Assessment In Note?: no

## 2022-11-05 VITALS
SYSTOLIC BLOOD PRESSURE: 176 MMHG | OXYGEN SATURATION: 91 % | RESPIRATION RATE: 18 BRPM | HEART RATE: 85 BPM | DIASTOLIC BLOOD PRESSURE: 84 MMHG | TEMPERATURE: 98 F

## 2022-11-05 PROCEDURE — 93308 TTE F-UP OR LMTD: CPT

## 2022-11-05 PROCEDURE — 87040 BLOOD CULTURE FOR BACTERIA: CPT

## 2022-11-05 PROCEDURE — 85027 COMPLETE CBC AUTOMATED: CPT

## 2022-11-05 PROCEDURE — 74018 RADEX ABDOMEN 1 VIEW: CPT

## 2022-11-05 PROCEDURE — 84132 ASSAY OF SERUM POTASSIUM: CPT

## 2022-11-05 PROCEDURE — 86900 BLOOD TYPING SEROLOGIC ABO: CPT

## 2022-11-05 PROCEDURE — 71045 X-RAY EXAM CHEST 1 VIEW: CPT

## 2022-11-05 PROCEDURE — 85025 COMPLETE CBC W/AUTO DIFF WBC: CPT

## 2022-11-05 PROCEDURE — 93005 ELECTROCARDIOGRAM TRACING: CPT

## 2022-11-05 PROCEDURE — 97166 OT EVAL MOD COMPLEX 45 MIN: CPT

## 2022-11-05 PROCEDURE — 85014 HEMATOCRIT: CPT

## 2022-11-05 PROCEDURE — 87077 CULTURE AEROBIC IDENTIFY: CPT

## 2022-11-05 PROCEDURE — 85730 THROMBOPLASTIN TIME PARTIAL: CPT

## 2022-11-05 PROCEDURE — 80048 BASIC METABOLIC PNL TOTAL CA: CPT

## 2022-11-05 PROCEDURE — U0005: CPT

## 2022-11-05 PROCEDURE — 86850 RBC ANTIBODY SCREEN: CPT

## 2022-11-05 PROCEDURE — 80053 COMPREHEN METABOLIC PANEL: CPT

## 2022-11-05 PROCEDURE — 83605 ASSAY OF LACTIC ACID: CPT

## 2022-11-05 PROCEDURE — 84443 ASSAY THYROID STIM HORMONE: CPT

## 2022-11-05 PROCEDURE — 87186 SC STD MICRODIL/AGAR DIL: CPT

## 2022-11-05 PROCEDURE — 83735 ASSAY OF MAGNESIUM: CPT

## 2022-11-05 PROCEDURE — 84295 ASSAY OF SERUM SODIUM: CPT

## 2022-11-05 PROCEDURE — 97530 THERAPEUTIC ACTIVITIES: CPT

## 2022-11-05 PROCEDURE — 83880 ASSAY OF NATRIURETIC PEPTIDE: CPT

## 2022-11-05 PROCEDURE — 70450 CT HEAD/BRAIN W/O DYE: CPT | Mod: MA

## 2022-11-05 PROCEDURE — U0003: CPT

## 2022-11-05 PROCEDURE — 86901 BLOOD TYPING SEROLOGIC RH(D): CPT

## 2022-11-05 PROCEDURE — 82962 GLUCOSE BLOOD TEST: CPT

## 2022-11-05 PROCEDURE — 82330 ASSAY OF CALCIUM: CPT

## 2022-11-05 PROCEDURE — 84484 ASSAY OF TROPONIN QUANT: CPT

## 2022-11-05 PROCEDURE — 87635 SARS-COV-2 COVID-19 AMP PRB: CPT

## 2022-11-05 PROCEDURE — 0225U NFCT DS DNA&RNA 21 SARSCOV2: CPT

## 2022-11-05 PROCEDURE — 97161 PT EVAL LOW COMPLEX 20 MIN: CPT

## 2022-11-05 PROCEDURE — 81001 URINALYSIS AUTO W/SCOPE: CPT

## 2022-11-05 PROCEDURE — 85610 PROTHROMBIN TIME: CPT

## 2022-11-05 PROCEDURE — 96375 TX/PRO/DX INJ NEW DRUG ADDON: CPT

## 2022-11-05 PROCEDURE — 82947 ASSAY GLUCOSE BLOOD QUANT: CPT

## 2022-11-05 PROCEDURE — 82803 BLOOD GASES ANY COMBINATION: CPT

## 2022-11-05 PROCEDURE — 99285 EMERGENCY DEPT VISIT HI MDM: CPT

## 2022-11-05 PROCEDURE — 85018 HEMOGLOBIN: CPT

## 2022-11-05 PROCEDURE — 71275 CT ANGIOGRAPHY CHEST: CPT | Mod: MA

## 2022-11-05 PROCEDURE — 97110 THERAPEUTIC EXERCISES: CPT

## 2022-11-05 PROCEDURE — 84100 ASSAY OF PHOSPHORUS: CPT

## 2022-11-05 PROCEDURE — 99239 HOSP IP/OBS DSCHRG MGMT >30: CPT

## 2022-11-05 PROCEDURE — 82435 ASSAY OF BLOOD CHLORIDE: CPT

## 2022-11-05 PROCEDURE — 96374 THER/PROPH/DIAG INJ IV PUSH: CPT

## 2022-11-05 PROCEDURE — 84145 PROCALCITONIN (PCT): CPT

## 2022-11-05 PROCEDURE — 82565 ASSAY OF CREATININE: CPT

## 2022-11-05 PROCEDURE — 87086 URINE CULTURE/COLONY COUNT: CPT

## 2022-11-05 PROCEDURE — 36415 COLL VENOUS BLD VENIPUNCTURE: CPT

## 2022-11-05 PROCEDURE — 92610 EVALUATE SWALLOWING FUNCTION: CPT

## 2022-11-05 RX ORDER — CLONAZEPAM 1 MG
1 TABLET ORAL
Qty: 0 | Refills: 0 | DISCHARGE

## 2022-11-05 RX ORDER — LOSARTAN POTASSIUM 100 MG/1
1 TABLET, FILM COATED ORAL
Qty: 0 | Refills: 0 | DISCHARGE

## 2022-11-05 RX ORDER — OXYBUTYNIN CHLORIDE 5 MG
1 TABLET ORAL
Qty: 0 | Refills: 0 | DISCHARGE

## 2022-11-05 RX ORDER — HYDROMORPHONE HYDROCHLORIDE 2 MG/ML
1 INJECTION INTRAMUSCULAR; INTRAVENOUS; SUBCUTANEOUS
Qty: 0 | Refills: 0 | DISCHARGE
Start: 2022-11-05

## 2022-11-05 RX ORDER — HYDROMORPHONE HYDROCHLORIDE 2 MG/ML
0.5 INJECTION INTRAMUSCULAR; INTRAVENOUS; SUBCUTANEOUS
Refills: 0 | Status: DISCONTINUED | OUTPATIENT
Start: 2022-11-05 | End: 2022-11-05

## 2022-11-05 RX ORDER — TRAZODONE HCL 50 MG
1 TABLET ORAL
Qty: 0 | Refills: 0 | DISCHARGE

## 2022-11-05 RX ORDER — HYDROMORPHONE HYDROCHLORIDE 2 MG/ML
0.5 INJECTION INTRAMUSCULAR; INTRAVENOUS; SUBCUTANEOUS
Qty: 0 | Refills: 0 | DISCHARGE
Start: 2022-11-05

## 2022-11-05 RX ORDER — ACETAMINOPHEN 500 MG
650 TABLET ORAL
Qty: 0 | Refills: 0 | DISCHARGE

## 2022-11-05 RX ORDER — NIFEDIPINE 30 MG
1 TABLET, EXTENDED RELEASE 24 HR ORAL
Qty: 0 | Refills: 0 | DISCHARGE

## 2022-11-05 RX ORDER — ONDANSETRON 8 MG/1
4 TABLET, FILM COATED ORAL
Qty: 0 | Refills: 0 | DISCHARGE

## 2022-11-05 RX ORDER — PANTOPRAZOLE SODIUM 20 MG/1
1 TABLET, DELAYED RELEASE ORAL
Qty: 0 | Refills: 0 | DISCHARGE

## 2022-11-05 RX ORDER — CHOLECALCIFEROL (VITAMIN D3) 125 MCG
1 CAPSULE ORAL
Qty: 0 | Refills: 0 | DISCHARGE

## 2022-11-05 RX ORDER — HYDROMORPHONE HYDROCHLORIDE 2 MG/ML
0.5 INJECTION INTRAMUSCULAR; INTRAVENOUS; SUBCUTANEOUS EVERY 6 HOURS
Refills: 0 | Status: DISCONTINUED | OUTPATIENT
Start: 2022-11-05 | End: 2022-11-05

## 2022-11-05 RX ORDER — CITALOPRAM 10 MG/1
1 TABLET, FILM COATED ORAL
Qty: 0 | Refills: 0 | DISCHARGE

## 2022-11-05 RX ORDER — GABAPENTIN 400 MG/1
1 CAPSULE ORAL
Qty: 0 | Refills: 0 | DISCHARGE

## 2022-11-05 RX ORDER — METOPROLOL TARTRATE 50 MG
1 TABLET ORAL
Qty: 0 | Refills: 0 | DISCHARGE

## 2022-11-05 RX ORDER — HYDROMORPHONE HYDROCHLORIDE 2 MG/ML
1 INJECTION INTRAMUSCULAR; INTRAVENOUS; SUBCUTANEOUS
Refills: 0 | Status: DISCONTINUED | OUTPATIENT
Start: 2022-11-05 | End: 2022-11-05

## 2022-11-05 RX ORDER — ZOLPIDEM TARTRATE 10 MG/1
1 TABLET ORAL
Qty: 0 | Refills: 0 | DISCHARGE

## 2022-11-05 RX ORDER — MIRTAZAPINE 45 MG/1
1 TABLET, ORALLY DISINTEGRATING ORAL
Qty: 0 | Refills: 0 | DISCHARGE

## 2022-11-05 RX ORDER — DOCUSATE SODIUM 100 MG
1 CAPSULE ORAL
Qty: 0 | Refills: 0 | DISCHARGE

## 2022-11-05 RX ORDER — MULTIVIT-MIN/FERROUS GLUCONATE 9 MG/15 ML
1 LIQUID (ML) ORAL
Qty: 0 | Refills: 0 | DISCHARGE

## 2022-11-05 RX ORDER — LIDOCAINE 4 G/100G
1 CREAM TOPICAL
Qty: 0 | Refills: 0 | DISCHARGE

## 2022-11-05 RX ADMIN — LIDOCAINE 1 PATCH: 4 CREAM TOPICAL at 07:26

## 2022-11-05 RX ADMIN — LIDOCAINE 1 PATCH: 4 CREAM TOPICAL at 00:25

## 2022-11-05 RX ADMIN — Medication 1 DROP(S): at 05:49

## 2022-11-05 RX ADMIN — Medication 1 MILLIGRAM(S): at 05:49

## 2022-11-05 RX ADMIN — HYDROMORPHONE HYDROCHLORIDE 0.5 MILLIGRAM(S): 2 INJECTION INTRAMUSCULAR; INTRAVENOUS; SUBCUTANEOUS at 07:07

## 2022-11-05 RX ADMIN — HYDROMORPHONE HYDROCHLORIDE 0.5 MILLIGRAM(S): 2 INJECTION INTRAMUSCULAR; INTRAVENOUS; SUBCUTANEOUS at 12:05

## 2022-11-05 RX ADMIN — Medication 1 MILLIGRAM(S): at 12:04

## 2022-11-05 NOTE — DISCHARGE NOTE NURSING/CASE MANAGEMENT/SOCIAL WORK - NSDCVIVACCINE_GEN_ALL_CORE_FT
Tdap; 06-Jun-2020 23:35; Yesenia Mark); [employer]; [id number] (Exp. Date: 15-Aug-2021); IntraMuscular; Deltoid Left.; 0.5 milliLiter(s); VIS (VIS Published: 09-May-2013, VIS Presented: 06-Jun-2020);

## 2022-11-05 NOTE — DISCHARGE NOTE NURSING/CASE MANAGEMENT/SOCIAL WORK - PATIENT PORTAL LINK FT
You can access the FollowMyHealth Patient Portal offered by [facility] by registering at the following website: [email or web address]. By joining [employer]’s FollowMyHealth portal, you will also be able to view your health information using other applications (apps) compatible with our system.

## 2022-11-08 NOTE — PHYSICAL THERAPY INITIAL EVALUATION ADULT - TRANSFER SAFETY CONCERNS NOTED: SIT/STAND, REHAB EVAL
Spoke to Claudean Coder who states the denial is actually a matter of in/out of network for [facility]. States if she ships it from a different location, this will resolve. She will send out this week. decreased balance during turns/losing balance/decreased weight-shifting ability

## 2022-12-20 NOTE — ED ADULT NURSE NOTE - HEART RATE (BEATS/MIN)
60 Partial Purse String (Intermediate) Text: Given the location of the defect and the characteristics of the surrounding skin an intermediate purse string closure was deemed most appropriate.  Undermining was performed circumfirentially around the surgical defect.  A purse string suture was then placed and tightened. Wound tension only allowed a partial closure of the circular defect.

## 2023-03-04 NOTE — ED ADULT TRIAGE NOTE - HEIGHT IN CM
03/03/23 2610   Provider Notification   Reason for Communication New consult   Provider Name Charly Chang MD  (on call: Jay Jay Gottlieb)   Method of Communication Page   Response Waiting for response   Notification Time 9214         MD will see pt today. No new orders at this time.
NURSING ADMISSION NOTE      Patient admitted via Cart  Oriented to room. Safety precautions initiated. Bed in low position. Call light in reach. Pt Navigator and medlist completed with pt at bedside. Pt A&Ox4. Room air, . No tele. VSS. IVF started. Voids, up SBA w/ cane. C/o of mild abdominal tenderness and constipation. No c/o of sob, n/v/d. Regular diet, pt reports poor appetite. Oncology consulted per order. Pt updated on poc. No further needs at this time. Safety/fall precautions in place. WCTM.
Pt Aox4. Glasses. VSS,afebrile. SpO2 >90% on RA. No tele order. Heparin. Incontinent at times, brief. No c/o pain, N/V/D. Up SBA. L port not accessed. IV SL. Pt updated on POC. Call light within reach, safety precautions in place. No further pt needs at this time.
157.48

## 2023-05-12 NOTE — ED ADULT NURSE NOTE - PATIENT DISCHARGE SIGNATURE
Subjective:      Patient ID: Corinna Staley is a 32 y.o. female.    Chief Complaint:  Threatened Miscarriage (Pt acknowlegded scant vaginal bleeding/)      History of Present Illness  HPI  Patient reports having cramping after her miscarriage.  She went to the emergency room this weekend however was counseled that this is normal.  She presents today for follow-up ultrasound.  Today she feels well.  Also she has light cramping and spotting.    GYN & OB History  Patient's last menstrual period was 2023.   Date of Last Pap: No result found    OB History    Para Term  AB Living   1             SAB IAB Ectopic Multiple Live Births                  # Outcome Date GA Lbr Andreas/2nd Weight Sex Delivery Anes PTL Lv   1                 Review of Systems  Review of Systems   Constitutional:  Negative for activity change, appetite change, chills, diaphoresis, fatigue, fever and unexpected weight change.   Respiratory:  Negative for cough and shortness of breath.    Cardiovascular:  Negative for chest pain, palpitations and leg swelling.   Gastrointestinal:  Negative for abdominal pain, bloating, constipation and diarrhea.   Genitourinary:  Positive for menstrual problem and pelvic pain. Negative for vaginal bleeding, vaginal discharge, vaginal pain, vaginal dryness and vaginal odor.   Musculoskeletal:  Negative for back pain and joint swelling.   Integumentary:  Negative for rash and acne.   Psychiatric/Behavioral:  Negative for depression. The patient is not nervous/anxious.         Objective:     Physical Exam:   Constitutional: She is oriented to person, place, and time. Vital signs are normal. She appears well-developed and well-nourished. She is cooperative.      Neck: No thyroid mass and no thyromegaly present.    Cardiovascular:  Normal rate and normal pulses.             Pulmonary/Chest: Effort normal. No respiratory distress.        Abdominal: Soft. She exhibits no distension. There is no  abdominal tenderness. No hernia.             Musculoskeletal: Moves all extremeties.       Neurological: She is alert and oriented to person, place, and time.    Skin: Skin is warm and dry. No rash noted.    Psychiatric: She has a normal mood and affect. Her speech is normal and behavior is normal. Judgment and thought content normal.       Assessment:     No diagnosis found.   Bleeding after miscarriage        Plan:     Patient with appropriate endometrial stripe.  Stable fibroids.  Follow-up as needed.   13-Nov-2017

## 2023-09-25 NOTE — ED ADULT NURSE NOTE - TEMPLATE LIST FOR HEAD TO TOE ASSESSMENT
Airway  Urgency: elective    Start Time: 9/25/2023 7:52 AM  Airway not difficult    General Information and Staff    Patient location during procedure: OR  Anesthesiologist: Hawa Chiang MD  Resident/CRNA: Renetta Joiner CRNA  Performed by: Renetta Joiner CRNA  Authorized by: Hawa Chiang MD      Indications and Patient Condition  Indications for airway management: anesthesia  Sedation level: deep  Preoxygenated: yes  Patient position: sniffing  MILS maintained throughout  Mask difficulty assessment: 0 - not attempted    Final Airway Details  Final airway type: supraglottic airway      Successful airway: iGel  Size 4     Number of attempts at approach: 1  Number of other approaches attempted: 0  Atraumatic airway insertion             Abdominal Pain, N/V/D

## 2023-11-28 NOTE — PHYSICAL THERAPY INITIAL EVALUATION ADULT - PLANNED THERAPY INTERVENTIONS, PT EVAL
[facility]        NAME: Adela Downing is a 71 y.o. female  : 1954    MRN: [id number]  DATE: 2023  TIME: 1:00 PM    Assessment and Plan   Acute cough [R05.1]  1. Acute cough  Poct Covid 19 Rapid Antigen Test    XR chest pa & lateral    azithromycin (ZITHROMAX) 250 mg tablet    predniSONE 20 mg tablet    albuterol (ProAir HFA) 90 mcg/act inhaler      2. Acute URI  azithromycin (ZITHROMAX) 250 mg tablet        3 to 4 days of cough, upper respiratory congestion and sinus pressure, and wheezing. Rapid COVID test was negative. Chest x-ray did not reveal any pneumonia, pleural effusion, pneumothorax-official read pending at time of discharge. On exam-lung fields slightly diminished throughout with slight expiratory wheezing. No history of asthma. Chronic smoker. Advised to follow-up with family doctor. Advised to go to the emergency room if any symptoms worsen. Patient Instructions     Take prescribed medication as instructed. Make sure to brush teeth/use mouthwash after inhaler. Tylenol for pain or fever. Make sure to stay well-hydrated and rest.    May do nasal saline rinses and Flonase over-the-counter for congestion. Recommended following up with your family doctor for further evaluation in the next few days. Follow up with PCP in 3-5 days. Proceed to  ER if symptoms worsen. Chief Complaint     Chief Complaint   Patient presents with    Cold Like Symptoms    Cough    Wheezing    Nasal Congestion     Started 3 days ago  OTC robitussin      sinus congestion         History of Present Illness       40-year-old female presents to the clinic with 3-4 days of cough, wheezing, congestion, sinus pressure. Denies any known sick contacts. PT is a chronic smoker-states she smokes about a pack every week and a half. She has been taking some over-the-counter medications including Robitussin which has slightly helped the cough. Denies history of asthma or COPD.   She denies any fever, chills, earache, sore throat, chest pain, shortness of breath, abdominal pain. Cough  Associated symptoms include rhinorrhea and wheezing. Pertinent negatives include no ear pain, sore throat or shortness of breath. Wheezing  Associated symptoms include coughing, rhinorrhea and wheezing. Pertinent negatives include no sore throat. Review of Systems   Review of Systems   Constitutional: Negative. HENT:  Positive for congestion, rhinorrhea and sinus pressure. Negative for ear discharge, ear pain and sore throat. Eyes: Negative. Respiratory:  Positive for cough and wheezing. Negative for shortness of breath. Cardiovascular: Negative. Gastrointestinal: Negative. Musculoskeletal: Negative. Neurological: Negative.           Current Medications       Current Outpatient Medications:     albuterol (ProAir HFA) 90 mcg/act inhaler, Inhale 2 puffs every 6 (six) hours as needed for wheezing, Disp: 8.5 g, Rfl: 0    ALPRAZolam (XANAX) 0.5 mg tablet, Take 0.5 mg by mouth 3 (three) times a day as needed, Disp: , Rfl:     azithromycin (ZITHROMAX) 250 mg tablet, Take 2 tablets today then 1 tablet daily x 4 days, Disp: 6 tablet, Rfl: 0    co-enzyme Q-10 100 mg capsule, Take 100 mg by mouth daily, Disp: , Rfl:     levothyroxine 50 mcg tablet, Take 50 mcg by mouth daily, Disp: , Rfl:     predniSONE 20 mg tablet, Take 2 tablets (40 mg total) by mouth daily for 5 days, Disp: 10 tablet, Rfl: 0    valsartan (DIOVAN) 80 mg tablet, Take orally 1 ORAL TABLET Every 24 Hours for 90 Days., Disp: , Rfl:     Current Allergies     Allergies as of 11/28/2023 - Reviewed 11/28/2023   Allergen Reaction Noted    Oxycodone Itching 11/27/2017    Penicillins Hives 08/01/2021            The following portions of the patient's history were reviewed and updated as appropriate: allergies, current medications, past family history, past medical history, past social history, past surgical history and problem list.     Past Medical History:   Diagnosis Date    Hypertension     Hypothyroidism        Past Surgical History:   Procedure Laterality Date     SECTION         No family history on file. Medications have been verified. Objective   /70   Pulse 81   Temp 98.4 °F (36.9 °C)   Resp 17   SpO2 97%        Physical Exam     Physical Exam  Constitutional:       General: She is not in acute distress. Appearance: Normal appearance. She is not ill-appearing or diaphoretic. HENT:      Head: Normocephalic and atraumatic. Right Ear: Tympanic membrane, ear canal and external ear normal.      Left Ear: Tympanic membrane, ear canal and external ear normal.      Nose: Congestion and rhinorrhea present. Right Sinus: Maxillary sinus tenderness present. Left Sinus: Maxillary sinus tenderness present. Mouth/Throat:      Mouth: Mucous membranes are moist.      Pharynx: Oropharynx is clear. No oropharyngeal exudate or posterior oropharyngeal erythema. Eyes:      Extraocular Movements: Extraocular movements intact. Conjunctiva/sclera: Conjunctivae normal.      Pupils: Pupils are equal, round, and reactive to light. Cardiovascular:      Rate and Rhythm: Normal rate and regular rhythm. Pulses: Normal pulses. Heart sounds: Normal heart sounds. Pulmonary:      Effort: Pulmonary effort is normal. No tachypnea, bradypnea, accessory muscle usage, prolonged expiration or respiratory distress. Breath sounds: No stridor or transmitted upper airway sounds. Decreased breath sounds (slightly diminished throughout bilaterally) and wheezing (Very slight end expiratory wheezing heard throughout) present. No rhonchi or rales. Chest:      Chest wall: No tenderness. Musculoskeletal:      Cervical back: Normal range of motion and neck supple. Lymphadenopathy:      Cervical: No cervical adenopathy. Skin:     General: Skin is warm and dry.       Capillary Refill: Capillary refill takes less than 2 seconds. Findings: No rash. Neurological:      General: No focal deficit present. Mental Status: She is alert.    Psychiatric:         Mood and Affect: Mood normal. strengthening/transfer training/bed mobility training/balance training/gait training

## 2024-06-17 NOTE — ED PROVIDER NOTE - PATIENT PORTAL LINK FT
Patient placed on cardiac monitor.  
Pt given discharge instructions, patient education, 0 paper prescriptions and follow up information. Pt verbalizes understanding. All questions answered. Pt discharged to home in private vehicle, ambulatory with steady gait. Pt A&Ox4, RA, pain controlled.    
You can access the FollowMyHealth Patient Portal offered by [facility] by registering at the following website: [email or web address]. By joining [employer]s FollowMyHealth portal, you will also be able to view your health information using other applications (apps) compatible with our system.

## 2024-08-16 NOTE — ED ADULT NURSE NOTE - CAS TRG GEN SKIN CONDITION
Pt here for pump disconnect. Declines any complaints or issues at this time. Reviewed follow up appointments and change in medication - Zyprexa only 3 days after chemo and trazodone at bedtime for sleep, pt verbalized understanding also wrote instructions in AVS.    Mohini Traore RN    
Warm/Dry

## 2024-10-07 NOTE — ED ADULT TRIAGE NOTE - BP NONINVASIVE DIASTOLIC (MM HG)
- Introduced myself as Dr. Cason's RN care coordinator and spoke with patient after biopsy was completed.     - The following people/ persons were present with patient and included in the education/discussion:   daughter     - Education provided about the following and a pamphlet given to patient to take home:    Dr. Cason and [facility] have established the [facility] which cares for more than 400 patients each year.     They maintain the highest level of care and treatment for patients with musculoskeletal tumors by integrating advances in laboratory investigation, clinical research , and education. The program hosts a multidisciplinary treatment team of specialists including orthopedic oncologic surgeons, radiation oncologists, and pediatric & adult medical oncologists.    - The [facility] is part of the [facility], which has been designated a Comprehensive Cancer Center by the National [employer] [place] (NCI). This prestigious designation places the [facility] among the nation's top research institutions specializing in cancer.     The long-term objectives of the [facility] are to improve the treatment of patients with musculoskeletal tumors through coordinated advances in laboratory research, clinical research, and education. The group also functions as a resource to the medical community and the people of [place] by providing the highest quality treatment of musculoskeletal bone and soft tissue tumors.    Let patient know we will discuss them at our weekly interdisciplinary conference.     - Discussed the following with patient:  Biopsy results can take up to 14 days to results. They often come back earlier.     Biopsy results will be released to [employer] at the same time they resulted to us.     Appointment scheduled with Dr. Cason to discuss biopsy  results. We discussed this appointment would be moved up if results are back earlier and urgent action needed. If biopsy results are not back by appointment we will move this back.     I let them know the two main categories of results are: 1. Benign or 2. Malignant.     If benign, we will discuss options and plan moving forward at scheduled appointment.      If malignant, the first step would be to get a PET scan to determine if there are other areas of disease. There may be other referral after this to medical oncology or radiation oncology. Dr. Cason will discuss this further when we have the pathology back.     All questions were answered.     Clinic contact information given to patient.     ------------    Authorization to Share Protected Health Information- Person to person communication signed by patient and authorized the following person or people:   - Mali Addi, spouse: [phone]  - Leela Fontana, daughter: [phone]  - Vinnie Fontnaa, son: [phone]    75

## 2024-11-25 NOTE — ED ADULT TRIAGE NOTE - NSWEIGHTCALCTOOLDRUG_GEN_A_CORE
procedures with satisfactory alignment and procedure.     US GUIDED NEEDLE PLACEMENT    Result Date: 11/4/2024  Radiology exam is complete. No Radiologist dictation. Please follow up with ordering provider.       Assessment / Plan:      Diagnosis Orders   1. Primary osteoarthritis of right hip  XR HIP 2-3 VW W PELVIS RIGHT         Orders Placed This Encounter   Procedures    XR HIP 2-3 VW W PELVIS RIGHT     Standing Status:   Future     Number of Occurrences:   1     Standing Expiration Date:   11/25/2025     Scheduling Instructions:      Standing AP pelvis, frog leg lateral standing     Order Specific Question:   Reason for exam:     Answer:   S/p hip replacement.  Standing AP pelvis, frog leg lateral standing     She is doing well status post right anterior total hip replacement Todd robotic assistance.  She is progressing well in her recovery.  She looks further along than she is.  We discussed the option of outpatient physical therapy versus her doing exercises on her own.  She is very familiar with the exercising having had the other side replaced previously.  She wished to proceed with home exercise program.  She will continue with the aspirin for DVT prophylaxis as previously prescribed. We discussed options for further follow-up and I like to see her back in about 2 to 3 months to assess her progress.  She should have updated x-rays of right hip at that time.  She was instructed call with questions or concerns in the meantime.     Stan Clark MD  Orthopedic Surgery  
 used

## 2025-03-14 NOTE — PHYSICAL THERAPY INITIAL EVALUATION ADULT - ASSISTIVE DEVICE FOR TRANSFER: SIT/STAND, REHAB EVAL
.                                                        [facility] INFECTIOUS DISEASE CONSULT NOTE    Jr Albert Patient Status:  Inpatient    1954 MRN [id number]   Location [address] ORTHO NEURO CARE Attending Fransisca Darby MD   Hosp Day # 1 PCP Sandra Salmeron MD       Reason for Consultation:  Septic arthritis.    History of Present Illness:  Jr Albert is 70 year old male with a history of diabetes, alcoholic cirrhosis of the liver, lymphedema, right TKA, resident of a local nursing home who came to the hospital due to mental status changes.    Patient was seen by our service in 2020 for for right lower extremity cellulitis.  Patient had Staph aureus bacteremia, he had mild pain in the right knee at that time, orthopedic consultation was requested to rule out septic arthritis however they did not think arthrocentesis was necessary.  Patient was discharged on cefazolin to complete 2 weeks treatment course.    Patient came back to the hospital on 2025 with right knee pain and swelling.  Blood cultures were positive for MSSA again.  Ortho was asked to evaluate for I&D there is a low suspicion for joint infection and he had a dry tap.  No I&D was done.  Patient was discharged on IV cefazolin for 6 weeks.    He now comes back with increasing confusion, right knee is noted to be swollen.  Apparently the knee has been tapped recently as an outpatient and there is concern for septic arthritis.  Dr. Vallecillo is on consult for surgical intervention.      History:  Past Medical History:   Diagnosis Date    Alcoholism  (CMD)     Cellulitis of right leg 2024    Diabetes mellitus  (CMD)     Lymphedema     right leg - follows in lymphedema clinic    Positive blood cultures 12/15/2024    Wound infection     right leg     Past Surgical History:   Procedure Laterality Date    Hernia repair      Hip surgery Left     Total knee replacement Right      No family history on file.   reports that  he has never smoked. He has never been exposed to tobacco smoke. He has never used smokeless tobacco. He reports that he does not currently use alcohol after a past usage of about 14.0 standard drinks of alcohol per week. He reports that he does not use drugs.    Allergies:  ALLERGIES:  No Known Allergies    Medications:  Current Facility-Administered Medications   Medication Dose Route Frequency Provider Last Rate Last Admin    ceFAZolin (ANCEF) 2,000 mg in sterile water (preservative free) IV syringe  2,000 mg Intravenous 3 times per day Mithani, Qaynat S, DO   2,000 mg at 03/14/25 0557    polyethylene glycol (MIRALAX) packet 17 g  17 g Oral Daily PRN Mithani, Qaynat S, DO        docusate sodium-sennosides (SENOKOT S) 50-8.6 MG 2 tablet  2 tablet Oral BID PRN Mithani, Qaynat S, DO        bisacodyl (DULCOLAX) suppository 10 mg  10 mg Rectal Daily PRN Mithani, Qaynat S, DO        melatonin tablet 3 mg  3 mg Oral Nightly PRN Mithani, Qaynat S, DO        Potassium Standard Replacement Protocol (Levels 3.5 and lower)   Does not apply See Admin Instructions Mithani, Qaynat S, DO        Magnesium Standard Replacement Protocol   Does not apply See Admin Instructions Mithani, Qaynat S, DO        sodium chloride 0.9 % injection 10 mL  10 mL Intravenous PRN Mithani, Qaynat S, DO        sodium chloride 0.9 % injection 2 mL  2 mL Intracatheter 2 times per day Mithani, Qaynat S, DO        sodium chloride (NORMAL SALINE) 0.9 % bolus 500 mL  500 mL Intravenous PRN Mithani, Qaynat S, DO        dextrose 50 % injection 25 g  25 g Intravenous PRN Mithani, Qaynat S, DO        dextrose 50 % injection 12.5 g  12.5 g Intravenous PRN Mithani, Qaynat S, DO        glucagon (GLUCAGEN) injection 1 mg  1 mg Intramuscular PRN Mithani, Qaynat S, DO        dextrose (GLUTOSE) 40 % gel 15 g  15 g Oral PRN Mithani, Qaynat S, DO        dextrose (GLUTOSE) 40 % gel 30 g  30 g Oral PRN Mithani, Qaynat S, DO        insulin lispro (ADMELOG,HumaLOG) -  Correction Dose   Subcutaneous TID WC Rosario Yanes S, DO        insulin lispro (ADMELOG,HumaLOG) - Correction Dose   Subcutaneous Nightly Rosario Yanes S, DO        potassium CHLORIDE 20 mEq/100mL IVPB premix  20 mEq Intravenous Once Fransisca Darby MD 50 mL/hr at 03/14/25 0955 20 mEq at 03/14/25 0955    Followed by    potassium CHLORIDE 20 mEq/100mL IVPB premix  20 mEq Intravenous Once Fransisca Darby MD            Review of Systems:  14 point systems reviewed with the patient, positive findings are documented in the HPI, rest is unremarkable.    Physical Exam:    General: No acute distress. Alert and oriented x 3.  Vital signs: Blood pressure 119/60, pulse 84, temperature 98.8 °F (37.1 °C), temperature source Axillary, resp. rate 20, height 6' 5\" (1.956 m), weight 108.9 kg (240 lb 1.3 oz), SpO2 94%.  HEENT:   Moist mucous membranes. Extraocular muscles are intact.  Neck: No lymphadenopathy.  No JVD. No carotid bruits.  Respiratory:   Clear to auscultation bilaterally.  No wheezes. No rhonchi.  Cardiovascular:   S1, S2.  Regular rate and rhythm.  No murmurs.  Abdomen:   Soft, nontender, nondistended.  Positive bowel sounds.  Musculoskeletal:  Right knee with effusion, erythema, tender to touch.  Right leg with some edema.  Right lateral foot wound  Integument:   As above.  Neuro:   Not able to fully assess.    Laboratory Data:  Recent Labs   Lab 03/14/25  0635 03/13/25  1700   WBC 8.2 9.2   HGB 10.7* 12.5*   HCT 30.8* 36.1*   .4* 103.1*   PLT 41* 63*   SODIUM 136 135   POTASSIUM 3.5 3.9   CHLORIDE 102 99   CO2 26 27   BUN 15 15   CREATININE 0.58* 0.67   GLUCOSE 139* 141*   CALCIUM 7.6* 8.1*   ALBUMIN 1.4* 1.9*   AST 26 33   GPT 7 7   BILIRUBIN 3.8* 4.8*   INR  --  1.7     Microbiologic Data:   (this admission)  Microbiology Results  (Last 10 results in the past 7 days)      Specimen   Gram Smear   Culture Result   Status       03/13/25  1528         Few Polymorphonuclear cells.  [P]            Rare Gram  positive cocci  [P]                   [P] - Preliminary Result                Imaging:  Imaging results reviewed     Impression:  Patient Active Problem List   Diagnosis    Cellulitis of right leg    Chronic right shoulder pain    Chronic left shoulder pain    Arthritis of right shoulder region    Adhesive capsulitis of left shoulder    Weakness of left shoulder    Abnormal findings on esophagogastroduodenoscopy (EGD)    Alcoholic cirrhosis of liver without ascites  (CMD)    Diabetes mellitus  (CMD)    Gastroesophageal reflux disease without esophagitis    Hepatic steatosis    Hiatal hernia    Hyperplastic colon polyp    Hypertension associated with diabetes  (CMD)    Internal hemorrhoids    Localized swelling of both lower legs    Macrocytosis without anemia    Personal history of gout    Primary osteoarthritis of both hips    Regular alcohol consumption    SBO (small bowel obstruction)  (CMD)    Sigmoid diverticulosis    Thrombocytopenia (CMD)    Tinnitus aurium    Transaminitis    Cellulitis of right lower leg    Fall    Lymphedema    S/P total knee arthroplasty, right    Obesity (BMI 30.0-34.9)    Cognitive communication deficit    Morbid (severe) obesity due to excess calories  (CMD)    Need for assistance with personal care    Other abnormalities of gait and mobility    Localized edema    Chronic pain of both lower extremities    Alcoholism  (CMD)    Chronic liver failure without hepatic coma  (CMD)    Hyponatremia    Cellulitis, unspecified cellulitis site       ASSESSMENT:    1.  Prosthetic joint infection right knee.  Treated with multiple courses of IV antibiotics without resolution.  Right knee aspiration as an outpatient by orthopedics and concern for infection.  2.  Staph aureus bacteremia secondary to #1.  Last positive blood culture 1/30.  Treated with IV cefazolin for 6 weeks.  3.  Alcoholic cirrhosis.  4.  Diabetes mellitus.  5.  Alcoholic liver cirrhosis.  6.  Thrombocytopenia.  7.  Metabolic  encephalopathy.    PLAN:  Continue IV cefazolin 2 g every 8 hours.  Joint aspiration 3/13 with rare gram-positive cocci/culture pending.  Blood cultures are currently pending.  Check ESR/CRP.  Dr. Vallecillo on consult for prosthesis removal/antibiotic spacer.  Repeat course of at least 6 weeks antibiotics postoperatively.  Further recommendations to follow.      Thank you for asking us to participate in the care of this patient.      Miguel Daly MD  [facility] INFECTIOUS DISEASE CONSULTANTSM [facility]  [phone]  3/14/2025  10:52 AM    rolling walker
